# Patient Record
Sex: FEMALE | Race: WHITE | NOT HISPANIC OR LATINO | Employment: UNEMPLOYED | URBAN - METROPOLITAN AREA
[De-identification: names, ages, dates, MRNs, and addresses within clinical notes are randomized per-mention and may not be internally consistent; named-entity substitution may affect disease eponyms.]

---

## 2017-03-16 ENCOUNTER — HOSPITAL ENCOUNTER (EMERGENCY)
Facility: MEDICAL CENTER | Age: 32
End: 2017-03-16
Attending: EMERGENCY MEDICINE
Payer: MEDICAID

## 2017-03-16 ENCOUNTER — APPOINTMENT (OUTPATIENT)
Dept: RADIOLOGY | Facility: MEDICAL CENTER | Age: 32
End: 2017-03-16
Attending: EMERGENCY MEDICINE
Payer: MEDICAID

## 2017-03-16 VITALS
BODY MASS INDEX: 32.42 KG/M2 | WEIGHT: 218.92 LBS | HEIGHT: 69 IN | RESPIRATION RATE: 18 BRPM | DIASTOLIC BLOOD PRESSURE: 69 MMHG | OXYGEN SATURATION: 99 % | TEMPERATURE: 98.4 F | HEART RATE: 84 BPM | SYSTOLIC BLOOD PRESSURE: 118 MMHG

## 2017-03-16 DIAGNOSIS — L03.116 CELLULITIS OF LEFT LOWER LEG: ICD-10-CM

## 2017-03-16 LAB
ALBUMIN SERPL BCP-MCNC: 3.7 G/DL (ref 3.2–4.9)
ALBUMIN/GLOB SERPL: 1.2 G/DL
ALP SERPL-CCNC: 65 U/L (ref 30–99)
ALT SERPL-CCNC: 47 U/L (ref 2–50)
ANION GAP SERPL CALC-SCNC: 9 MMOL/L (ref 0–11.9)
AST SERPL-CCNC: 20 U/L (ref 12–45)
BASOPHILS # BLD AUTO: 0.3 % (ref 0–1.8)
BASOPHILS # BLD: 0.03 K/UL (ref 0–0.12)
BILIRUB SERPL-MCNC: 1 MG/DL (ref 0.1–1.5)
BUN SERPL-MCNC: 13 MG/DL (ref 8–22)
CALCIUM SERPL-MCNC: 9.2 MG/DL (ref 8.5–10.5)
CHLORIDE SERPL-SCNC: 103 MMOL/L (ref 96–112)
CO2 SERPL-SCNC: 23 MMOL/L (ref 20–33)
CREAT SERPL-MCNC: 0.63 MG/DL (ref 0.5–1.4)
EOSINOPHIL # BLD AUTO: 0.11 K/UL (ref 0–0.51)
EOSINOPHIL NFR BLD: 1.1 % (ref 0–6.9)
ERYTHROCYTE [DISTWIDTH] IN BLOOD BY AUTOMATED COUNT: 42.5 FL (ref 35.9–50)
GFR SERPL CREATININE-BSD FRML MDRD: >60 ML/MIN/1.73 M 2
GLOBULIN SER CALC-MCNC: 3.1 G/DL (ref 1.9–3.5)
GLUCOSE SERPL-MCNC: 112 MG/DL (ref 65–99)
HCT VFR BLD AUTO: 39.2 % (ref 37–47)
HGB BLD-MCNC: 13.4 G/DL (ref 12–16)
IMM GRANULOCYTES # BLD AUTO: 0.04 K/UL (ref 0–0.11)
IMM GRANULOCYTES NFR BLD AUTO: 0.4 % (ref 0–0.9)
LYMPHOCYTES # BLD AUTO: 2.36 K/UL (ref 1–4.8)
LYMPHOCYTES NFR BLD: 23.6 % (ref 22–41)
MCH RBC QN AUTO: 31.5 PG (ref 27–33)
MCHC RBC AUTO-ENTMCNC: 34.2 G/DL (ref 33.6–35)
MCV RBC AUTO: 92.2 FL (ref 81.4–97.8)
MONOCYTES # BLD AUTO: 0.69 K/UL (ref 0–0.85)
MONOCYTES NFR BLD AUTO: 6.9 % (ref 0–13.4)
NEUTROPHILS # BLD AUTO: 6.76 K/UL (ref 2–7.15)
NEUTROPHILS NFR BLD: 67.7 % (ref 44–72)
NRBC # BLD AUTO: 0 K/UL
NRBC BLD AUTO-RTO: 0 /100 WBC
PLATELET # BLD AUTO: 240 K/UL (ref 164–446)
PMV BLD AUTO: 8.8 FL (ref 9–12.9)
POTASSIUM SERPL-SCNC: 3.4 MMOL/L (ref 3.6–5.5)
PROT SERPL-MCNC: 6.8 G/DL (ref 6–8.2)
RBC # BLD AUTO: 4.25 M/UL (ref 4.2–5.4)
SODIUM SERPL-SCNC: 135 MMOL/L (ref 135–145)
WBC # BLD AUTO: 10 K/UL (ref 4.8–10.8)

## 2017-03-16 PROCEDURE — 99285 EMERGENCY DEPT VISIT HI MDM: CPT

## 2017-03-16 PROCEDURE — 85025 COMPLETE CBC W/AUTO DIFF WBC: CPT

## 2017-03-16 PROCEDURE — 76882 US LMTD JT/FCL EVL NVASC XTR: CPT | Mod: LT

## 2017-03-16 PROCEDURE — 80053 COMPREHEN METABOLIC PANEL: CPT

## 2017-03-16 PROCEDURE — 36415 COLL VENOUS BLD VENIPUNCTURE: CPT

## 2017-03-16 PROCEDURE — 96365 THER/PROPH/DIAG IV INF INIT: CPT

## 2017-03-16 PROCEDURE — 87040 BLOOD CULTURE FOR BACTERIA: CPT | Mod: 91

## 2017-03-16 PROCEDURE — 96375 TX/PRO/DX INJ NEW DRUG ADDON: CPT

## 2017-03-16 PROCEDURE — 700111 HCHG RX REV CODE 636 W/ 250 OVERRIDE (IP): Performed by: EMERGENCY MEDICINE

## 2017-03-16 RX ORDER — SODIUM CHLORIDE 9 MG/ML
INJECTION, SOLUTION INTRAVENOUS CONTINUOUS
Status: DISCONTINUED | OUTPATIENT
Start: 2017-03-16 | End: 2017-03-16 | Stop reason: HOSPADM

## 2017-03-16 RX ORDER — ONDANSETRON 2 MG/ML
4 INJECTION INTRAMUSCULAR; INTRAVENOUS ONCE
Status: COMPLETED | OUTPATIENT
Start: 2017-03-16 | End: 2017-03-16

## 2017-03-16 RX ORDER — CEFTRIAXONE 1 G/1
1 INJECTION, POWDER, FOR SOLUTION INTRAMUSCULAR; INTRAVENOUS ONCE
Status: COMPLETED | OUTPATIENT
Start: 2017-03-16 | End: 2017-03-16

## 2017-03-16 RX ORDER — MORPHINE SULFATE 4 MG/ML
4 INJECTION, SOLUTION INTRAMUSCULAR; INTRAVENOUS ONCE
Status: COMPLETED | OUTPATIENT
Start: 2017-03-16 | End: 2017-03-16

## 2017-03-16 RX ORDER — SULFAMETHOXAZOLE AND TRIMETHOPRIM 800; 160 MG/1; MG/1
1 TABLET ORAL 2 TIMES DAILY
Qty: 20 TAB | Refills: 0 | Status: SHIPPED | OUTPATIENT
Start: 2017-03-16 | End: 2017-03-16

## 2017-03-16 RX ORDER — SULFAMETHOXAZOLE AND TRIMETHOPRIM 800; 160 MG/1; MG/1
1 TABLET ORAL 2 TIMES DAILY
Qty: 20 TAB | Refills: 0 | Status: SHIPPED | OUTPATIENT
Start: 2017-03-16 | End: 2017-03-26

## 2017-03-16 RX ORDER — CEFDINIR 300 MG/1
300 CAPSULE ORAL 2 TIMES DAILY
Qty: 20 CAP | Refills: 0 | Status: SHIPPED | OUTPATIENT
Start: 2017-03-16 | End: 2017-03-26

## 2017-03-16 RX ORDER — CEFDINIR 300 MG/1
300 CAPSULE ORAL 2 TIMES DAILY
Qty: 20 CAP | Refills: 0 | Status: SHIPPED | OUTPATIENT
Start: 2017-03-16 | End: 2017-03-16

## 2017-03-16 RX ADMIN — MORPHINE SULFATE 4 MG: 4 INJECTION INTRAVENOUS at 17:32

## 2017-03-16 RX ADMIN — CEFTRIAXONE 1 G: 1 INJECTION, POWDER, FOR SOLUTION INTRAMUSCULAR; INTRAVENOUS at 17:32

## 2017-03-16 RX ADMIN — ONDANSETRON 4 MG: 2 INJECTION, SOLUTION INTRAMUSCULAR; INTRAVENOUS at 17:32

## 2017-03-16 NOTE — ED NOTES
Chief Complaint   Patient presents with   • Wound Check     Patient states she was seen at [facility] for a wound infection to LLE. She was prescribed abx but hasn't been able to fill them due to issues with her maiden name. Patient here for re-check and prescriptions.

## 2017-03-16 NOTE — ED AVS SNAPSHOT
Home Care Instructions                                                                                                                Kat Devlin   MRN: [id number]    Department:  [facility], Emergency Dept   Date of Visit:  3/16/2017            [facility], Emergency Dept    [address] [facility] [address]    Phone:  [phone]      You were seen by     Catherine Palacios M.D.      Your Diagnosis Was     Cellulitis of left lower leg     L03.116       These are the medications you received during your hospitalization from 03/16/2017 1438 to 03/16/2017 1815     Date/Time Order Dose Route Action    03/16/2017 1732 morphine (pf) 4 mg/ml injection 4 mg 4 mg Intravenous Given    03/16/2017 1732 ondansetron (ZOFRAN) syringe/vial injection 4 mg 4 mg Intravenous Given    03/16/2017 1732 cefTRIAXone (ROCEPHIN) injection 1 g 1 g Intravenous Given      Follow-up Information     1. Follow up with LAURA PUENTE. Call in 1 day.    Why:  for recheck, to establish care    Contact information    [address] [place] [address] [phone]      Medication Information     Review all of your home medications and newly ordered medications with your primary doctor and/or pharmacist as soon as possible. Follow medication instructions as directed by your doctor and/or pharmacist.     Please keep your complete medication list with you and share with your physician. Update the information when medications are discontinued, doses are changed, or new medications (including over-the-counter products) are added; and carry medication information at all times in the event of emergency situations.               Medication List      START taking these medications        Instructions    Morning Afternoon Evening Bedtime    cefdinir 300 MG Caps   Commonly known as:  OMNICEF        Take 1 Cap by mouth 2 times a day for 10 days.   Dose:  300 mg                        sulfamethoxazole-trimethoprim 800-160 MG tablet   Commonly known as:  BACTRIM DS        Take 1 Tab by mouth 2 times a day for 10 days.   Dose:  1 Tab                          ASK your doctor about these medications        Instructions    Morning Afternoon Evening Bedtime    ondansetron 4 MG Tbdp   Commonly known as:  ZOFRAN ODT        Take 1 Tab by mouth every four hours as needed for Nausea/Vomiting (give PO if no IV route available).   Dose:  4 mg                        oxycodone immediate-release 5 MG Tabs   Commonly known as:  ROXICODONE        Take 1 Tab by mouth every four hours as needed.   Dose:  5 mg                             Where to Get Your Medications      These medications were sent to [id number] - [place], [place] [facility] [address] [facility] [address] [place] [address]     Phone:  [phone]    - cefdinir 300 MG Caps  - sulfamethoxazole-trimethoprim 800-160 MG tablet            Procedures and tests performed during your visit     Procedure/Test Number of Times Performed    BLOOD CULTURE 2    CBC WITH DIFFERENTIAL 1    COMP METABOLIC PANEL 1    ESTIMATED GFR 1    IV Saline Lock 1    US-EXTREMITY NON VASCULAR UNILATERAL LEFT 1        Discharge Instructions       Cellulitis  Cellulitis is an infection of the skin and the tissue under the skin. The infected area is usually red and tender. This happens most often in the arms and lower legs.  HOME CARE   · Take your antibiotic medicine as told. Finish the medicine even if you start to feel better.  · Keep the infected arm or leg raised (elevated).  · Put a warm cloth on the area up to 4 times per day.  · Only take medicines as told by your doctor.  · Keep all doctor visits as told.  GET HELP IF:  · You see red streaks on the skin coming from the infected area.  · Your red area gets bigger or turns a dark color.  · Your bone or joint under the infected area is painful after the skin heals.  · Your infection comes back in  the same area or different area.  · You have a puffy (swollen) bump in the infected area.  · You have new symptoms.  · You have a fever.  GET HELP RIGHT AWAY IF:   · You feel very sleepy.  · You throw up (vomit) or have watery poop (diarrhea).  · You feel sick and have muscle aches and pains.  MAKE SURE YOU:   · Understand these instructions.  · Will watch your condition.  · Will get help right away if you are not doing well or get worse.     This information is not intended to replace advice given to you by your health care provider. Make sure you discuss any questions you have with your health care provider.     Document Released: 06/05/2009 Document Revised: 01/08/2016 Document Reviewed: 03/04/2013  [employer] Interactive Patient Education ©2016 [employer]            Patient Information     Patient Information    Following emergency treatment: all patient requiring follow-up care must return either to a private physician or a clinic if your condition worsens before you are able to obtain further medical attention, please return to the emergency room.     Billing Information    At [facility], we work to make the billing process streamlined for our patients.  Our Representatives are here to answer any questions you may have regarding your hospital bill.  If you have insurance coverage and have supplied your insurance information to us, we will submit a claim to your insurer on your behalf.  Should you have any questions regarding your bill, we can be reached online or by phone as follows:  Online: You are able pay your bills online or live chat with our representatives about any billing questions you may have. We are here to help Monday - Friday from 8:00am to 7:30pm and 9:00am - 12:00pm on Saturdays.  Please visit [email or web address]  for more information.   Phone:  [phone] or [phone]    Please note that your emergency physician, surgeon, pathologist, radiologist,  anesthesiologist, and other specialists are not employed by [facility] and will therefore bill separately for their services.  Please contact them directly for any questions concerning their bills at the numbers below:     Emergency Physician Services:  [phone]  [facility]:  [phone]  [facility]:  [phone]  [facility]:  [phone]    1. Your final bill may vary from the amount quoted upon discharge if all procedures are not complete at that time, or if your doctor has additional procedures of which we are not aware. You will receive an additional bill if you return to the Emergency Department at [facility] for suture removal regardless of the facility of which the sutures were placed.     2. Please arrange for settlement of this account at the emergency registration.    3. All self-pay accounts are due in full at the time of treatment.  If you are unable to meet this obligation then payment is expected within 4-5 days.     4. If you have had radiology studies (CT, X-ray, Ultrasound, MRI), you have received a preliminary result during your emergency department visit. Please contact the radiology department [phone] to receive a copy of your final result. Please discuss the Final result with your primary physician or with the follow up physician provided.     Crisis Hotline:  [place] Crisis Hotline:  1-412-EOPFPWI or 1-102.491.8564  [place] Crisis Hotline:    1-609.902.1776 or 196-268-5990         ED Discharge Follow Up Questions    1. In order to provide you with very good care, we would like to follow up with a phone call in the next few days.  May we have your permission to contact you?     YES /  NO    2. What is the best phone number to call you? (       )_____-__________    3. What is the best time to call you?      Morning  /  Afternoon  /  Evening                   Patient Signature:   ____________________________________________________________    Date:  ____________________________________________________________

## 2017-03-16 NOTE — ED AVS SNAPSHOT
[employer] Access Code: [email or web address]  Expires: 4/15/2017  6:15 PM    Your email address is not on file at [facility].  Email Addresses are required for you to sign up for [employer], please contact [phone] to verify your personal information and to provide your email address prior to attempting to register for [employer].    Kat Devlin  [address] [place] [address]    [employer]  A secure, online tool to manage your health information     [facility]’s [employer]® is a secure, online tool that connects you to your personalized health information from the privacy of your home -- day or night - making it very easy for you to manage your healthcare. Once the activation process is completed, you can even access your medical information using the [employer] gisella, which is available for free in the Apple Gisella store or Google Play store.     To learn more about [employer], visit [email or web address]    There are two levels of access available (as shown below):   My Chart Features  [facility] Primary Care Doctor [facility]  Specialists [facility]  Urgent  Care Non-[facility] Primary Care Doctor   Email your healthcare team securely and privately 24/7 X X X    Manage appointments: schedule your next appointment; view details of past/upcoming appointments X      Request prescription refills. X      View recent personal medical records, including lab and immunizations X X X X   View health record, including health history, allergies, medications X X X X   Read reports about your outpatient visits, procedures, consult and ER notes X X X X   See your discharge summary, which is a recap of your hospital and/or ER visit that includes your diagnosis, lab results, and care plan X X  X     How to register for [employer]:  Once your e-mail address has been verified, follow the following steps to sign up for [employer].     1. Go to  [email or web address]  2. Click on the Sign Up Now box, which takes you to the New Member Sign Up page. You will need  to provide the following information:  a. Enter your [employer] Access Code exactly as it appears at the top of this page. (You will not need to use this code after you’ve completed the sign-up process. If you do not sign up before the expiration date, you must request a new code.)   b. Enter your date of birth.   c. Enter your home email address.   d. Click Submit, and follow the next screen’s instructions.  3. Create a [employer] ID. This will be your [employer] login ID and cannot be changed, so think of one that is secure and easy to remember.  4. Create a [employer] password. You can change your password at any time.  5. Enter your Password Reset Question and Answer. This can be used at a later time if you forget your password.   6. Enter your e-mail address. This allows you to receive e-mail notifications when new information is available in [employer].  7. Click Sign Up. You can now view your health information.    For assistance activating your [employer] account, call [phone]

## 2017-03-16 NOTE — ED AVS SNAPSHOT
3/16/2017          Kat Devlin  [address] [place] [address]    Dear Kat:    [facility] wants to ensure your discharge home is safe and you or your loved ones have had all your questions answered regarding your care after you leave the hospital.    You may receive a telephone call within two days of your discharge.  This call is to make certain you understand your discharge instructions as well as ensure we provided you with the best care possible during your stay with us.     The call will only last approximately 3-5 minutes and will be done by a nurse.    Once again, we want to ensure your discharge home is safe and that you have a clear understanding of any next steps in your care.  If you have any questions or concerns, please do not hesitate to contact us, we are here for you.  Thank you for choosing [facility] for your healthcare needs.    Sincerely,    Osvaldo Jones    [facility]

## 2017-03-16 NOTE — ED PROVIDER NOTES
ED Provider Note    CHIEF COMPLAINT   Chief Complaint   Patient presents with   • Wound Check       HPI   Kat Devlin is a 31 y.o. female who presents claiming of her right lower extremity cellulitis. The patient states that she's had pain in the leg for about a week, but 2 days ago it turned red, hot and erythematous over the distal aspect of her left shin. She was seen at [facility] 2 days ago and given IV antibiotics and for a prescription for oral antibiotics. The patient states that she does not have an ID and they cannot prescribe her antibiotics under her  name, so she was unable to fill them as an outpatient. She will return to [facility] again today requesting a refill of the antibiotics, but was refused this and sent back home. She states that the pain in her left shin was much worse than it was 2 days ago. She states that the redness has not really extended beyond the border of the lines drawn initially. She has a lot of pain with walking. She denies any numbness distally. She denies any history of diabetes. Currently her pain is 10 over 10. Movement makes it worse and nothing helps it. She is homeless.     REVIEW OF SYSTEMS     HEENT:  No ear pain, congestion or sore throat   EYES: no discharge redness or vision changes  CARDIAC: no chest pain, palpitations    PULMONARY: no dyspnea, cough or congestion   GI: no vomiting diarrhea or abdominal pain   : no dysuria, back pain or hematuria   Neuro: no weakness, numbness aphasia or headache  Musculoskeletal: See history of present illness  Endocrine: Positive fevers, sweating, weight loss   SKIN: see history of present illness    See history of present illness all other systems are negative        PAST MEDICAL HISTORY   Past Medical History   Diagnosis Date   • Migraines    • Obesity    • Back pain 6/21/2011   • Headache(784.0) 7/25/2011   • Pain 1/2012     bilat. feet 6/10   • Arthritis      knee and back   • Type II or unspecified type diabetes  mellitus without mention of complication, not stated as uncontrolled      diet and oral meds   • Psychiatric disorder      depression and anxiety   • ASTHMA      rarely. uses inhaler exercise inducted   • PCOS (polycystic ovarian syndrome)        FAMILY HISTORY  Family History   Problem Relation Age of Onset   • Non-contributory Mother    • Hyperlipidemia Mother    • Non-contributory Father    • Alcohol/Drug Father    • Arthritis Maternal Grandmother    • Hyperlipidemia Maternal Grandmother    • Heart Disease Maternal Grandfather        SOCIAL HISTORY  Social History     Social History   • Marital Status: Single     Spouse Name: N/A   • Number of Children: N/A   • Years of Education: N/A     Social History Main Topics   • Smoking status: Current Every Day Smoker -- 0.50 packs/day for 18 years     Types: Cigarettes   • Smokeless tobacco: Never Used   • Alcohol Use: No   • Drug Use: Yes     Special: Intravenous      Comment: Meth   • Sexual Activity:     Partners: Male     Other Topics Concern   • Not on file     Social History Narrative    ** Merged History Encounter **             SURGICAL HISTORY  Past Surgical History   Procedure Laterality Date   • Pr  delivery only     • Repeat c section  2012     Performed by DESIREE TUCKER at [facility]   • Other       bilateral knee surgery   • Other  2014     oral surgery in office   • Tubal coagulation laparoscopic bilateral  2015     Performed by Juan R Sutton M.D. at [facility]   • Dilation and curettage  2015     Performed by Juan R Sutton M.D. at [facility]   • Hysteroscopy novasure-2  2015     Performed by Juan R Sutton M.D. at [facility]       CURRENT MEDICATIONS   Home Medications     **Home medications have not yet been reviewed for this encounter**          ALLERGIES   No Known Allergies    PHYSICAL EXAM  VITAL SIGNS: /73 mmHg  Pulse 99  Temp(Src) 36.9  "°C (98.4 °F)  Resp 18  Ht 1.753 m (5' 9\")  Wt 99.3 kg (218 lb 14.7 oz)  BMI 32.31 kg/m2  SpO2 99% Room air O2: 99    Constitutional: Well developed, Well nourished, No acute distress, Non-toxic appearance.   Cardiovascular: Normal heart rate, Normal rhythm, No murmurs, No rubs, No gallops.   Thorax & Lungs: Normal breath sounds, No respiratory distress, No wheezing, No chest tenderness.   Skin: The patient's right lower anterior shin is erythematous and acutely tender with edema, there is no drainage. Distally she is neurovascularly intact, but has a lot of pain in the leg with any sort of movement of her foot  Extremities: Intact distal pulses, No edema, No tenderness, No cyanosis, No clubbing.       COURSE & MEDICAL DECISION MAKING  Pertinent Labs & Imaging studies reviewed. (See chart for details)  This time, I'm concerned about a deep abscess. An IV was started and I ordered IV antibiotics as well as pain medications.      Radiology results:  US-EXTREMITY NON VASCULAR UNILATERAL LEFT   Final Result      1.  No sonographic evidence of abscess.      2.  There is edema in the soft tissues. Cellulitis is in the differential diagnosis.        Results for orders placed or performed during the hospital encounter of 03/16/17   CBC WITH DIFFERENTIAL   Result Value Ref Range    WBC 10.0 4.8 - 10.8 K/uL    RBC 4.25 4.20 - 5.40 M/uL    Hemoglobin 13.4 12.0 - 16.0 g/dL    Hematocrit 39.2 37.0 - 47.0 %    MCV 92.2 81.4 - 97.8 fL    MCH 31.5 27.0 - 33.0 pg    MCHC 34.2 33.6 - 35.0 g/dL    RDW 42.5 35.9 - 50.0 fL    Platelet Count 240 164 - 446 K/uL    MPV 8.8 (L) 9.0 - 12.9 fL    Neutrophils-Polys 67.70 44.00 - 72.00 %    Lymphocytes 23.60 22.00 - 41.00 %    Monocytes 6.90 0.00 - 13.40 %    Eosinophils 1.10 0.00 - 6.90 %    Basophils 0.30 0.00 - 1.80 %    Immature Granulocytes 0.40 0.00 - 0.90 %    Nucleated RBC 0.00 /100 WBC    Neutrophils (Absolute) 6.76 2.00 - 7.15 K/uL    Lymphs (Absolute) 2.36 1.00 - 4.80 K/uL    " Monos (Absolute) 0.69 0.00 - 0.85 K/uL    Eos (Absolute) 0.11 0.00 - 0.51 K/uL    Baso (Absolute) 0.03 0.00 - 0.12 K/uL    Immature Granulocytes (abs) 0.04 0.00 - 0.11 K/uL    NRBC (Absolute) 0.00 K/uL   COMP METABOLIC PANEL   Result Value Ref Range    Sodium 135 135 - 145 mmol/L    Potassium 3.4 (L) 3.6 - 5.5 mmol/L    Chloride 103 96 - 112 mmol/L    Co2 23 20 - 33 mmol/L    Anion Gap 9.0 0.0 - 11.9    Glucose 112 (H) 65 - 99 mg/dL    Bun 13 8 - 22 mg/dL    Creatinine 0.63 0.50 - 1.40 mg/dL    Calcium 9.2 8.5 - 10.5 mg/dL    AST(SGOT) 20 12 - 45 U/L    ALT(SGPT) 47 2 - 50 U/L    Alkaline Phosphatase 65 30 - 99 U/L    Total Bilirubin 1.0 0.1 - 1.5 mg/dL    Albumin 3.7 3.2 - 4.9 g/dL    Total Protein 6.8 6.0 - 8.2 g/dL    Globulin 3.1 1.9 - 3.5 g/dL    A-G Ratio 1.2 g/dL   ESTIMATED GFR   Result Value Ref Range    GFR If African American >60 >60 mL/min/1.73 m 2    GFR If Non African American >60 >60 mL/min/1.73 m 2        The patient's ultrasound does not show an abscess. Her white count is normal.  I will dress her wound with an emetic ointment and give her crutches to stay off her leg. We'll give her a copy of her facies show she can get her antibiotics filled for free at any pharmacy. At this time, she will be discharged in stable condition.    LAURA PUENTE  [address] [id number] [phone]  Call in 1 day  for recheck, to establish care    Current Outpatient Prescriptions   Medication Sig Dispense Refill   • cefdinir (OMNICEF) 300 MG Cap Take 1 Cap by mouth 2 times a day for 10 days. 20 Cap 0   • sulfamethoxazole-trimethoprim (BACTRIM DS) 800-160 MG tablet Take 1 Tab by mouth 2 times a day for 10 days. 20 Tab 0   • oxycodone immediate-release (ROXICODONE) 5 MG Tab Take 1 Tab by mouth every four hours as needed. 20 Tab 0   • ondansetron (ZOFRAN ODT) 4 MG TABLET DISPERSIBLE Take 1 Tab by mouth every four hours as needed for Nausea/Vomiting (give PO if no IV route available). 10 Tab 0          FINAL IMPRESSION  1. Cellulitis of left lower leg            Electronically signed by: Catherine Palacios, 3/16/2017 4:24 PM

## 2017-03-17 NOTE — ED NOTES
Pt given crutches and instructions for use. Pt return demonstrates proper use. Discharge instructions given. Verbalizes understanding. Left with all belongings. Walked to CARL[facility] lujan.

## 2017-03-17 NOTE — DISCHARGE PLANNING
TC from pts [facility] stating she cannot get her antibiotics due to her ID having the wrong  name.    Pt doesn't need identification for antibiotics, SW supplying facesheet for pt to provide to any pharmacy. Pt cannot get narcotics without an ID that matches the prescribed name. Pt has Medicaid - [employer] all prescriptions are free, no copay.     Pt also has [employer] for free transportation to pharmacy and home from hospital. Pt can d/c to lobby when medically appropriate to contact [facility]. [facility] has a 24 hour pharmacy.

## 2017-03-17 NOTE — DISCHARGE INSTRUCTIONS
Cellulitis  Cellulitis is an infection of the skin and the tissue under the skin. The infected area is usually red and tender. This happens most often in the arms and lower legs.  HOME CARE   · Take your antibiotic medicine as told. Finish the medicine even if you start to feel better.  · Keep the infected arm or leg raised (elevated).  · Put a warm cloth on the area up to 4 times per day.  · Only take medicines as told by your doctor.  · Keep all doctor visits as told.  GET HELP IF:  · You see red streaks on the skin coming from the infected area.  · Your red area gets bigger or turns a dark color.  · Your bone or joint under the infected area is painful after the skin heals.  · Your infection comes back in the same area or different area.  · You have a puffy (swollen) bump in the infected area.  · You have new symptoms.  · You have a fever.  GET HELP RIGHT AWAY IF:   · You feel very sleepy.  · You throw up (vomit) or have watery poop (diarrhea).  · You feel sick and have muscle aches and pains.  MAKE SURE YOU:   · Understand these instructions.  · Will watch your condition.  · Will get help right away if you are not doing well or get worse.     This information is not intended to replace advice given to you by your health care provider. Make sure you discuss any questions you have with your health care provider.     Document Released: 06/05/2009 Document Revised: 01/08/2016 Document Reviewed: 03/04/2013  [employer] Interactive Patient Education ©2016 [employer]

## 2017-03-21 LAB
BACTERIA BLD CULT: NORMAL
BACTERIA BLD CULT: NORMAL
SIGNIFICANT IND 70042: NORMAL
SIGNIFICANT IND 70042: NORMAL
SITE SITE: NORMAL
SITE SITE: NORMAL
SOURCE SOURCE: NORMAL
SOURCE SOURCE: NORMAL

## 2017-04-20 ENCOUNTER — HOSPITAL ENCOUNTER (EMERGENCY)
Facility: MEDICAL CENTER | Age: 32
End: 2017-04-20
Payer: MEDICAID

## 2017-04-20 VITALS
SYSTOLIC BLOOD PRESSURE: 107 MMHG | HEART RATE: 106 BPM | OXYGEN SATURATION: 96 % | RESPIRATION RATE: 18 BRPM | TEMPERATURE: 99 F | BODY MASS INDEX: 33.8 KG/M2 | WEIGHT: 229 LBS | DIASTOLIC BLOOD PRESSURE: 79 MMHG

## 2017-04-20 PROCEDURE — 302449 STATCHG TRIAGE ONLY (STATISTIC)

## 2017-04-21 NOTE — ED NOTES
"Pt ambulates to triage with c/c open wounds to left lower leg & bilateral forearms.  \"It's from me picking at myself.\"  A&ox4.  Pt to lobby & advised to inform RN of any changes.  "

## 2017-04-22 ENCOUNTER — RESOLUTE PROFESSIONAL BILLING HOSPITAL PROF FEE (OUTPATIENT)
Dept: HOSPITALIST | Facility: MEDICAL CENTER | Age: 32
End: 2017-04-22
Payer: MEDICAID

## 2017-04-22 ENCOUNTER — HOSPITAL ENCOUNTER (INPATIENT)
Facility: MEDICAL CENTER | Age: 32
LOS: 3 days | DRG: 603 | End: 2017-04-25
Attending: EMERGENCY MEDICINE | Admitting: HOSPITALIST
Payer: MEDICAID

## 2017-04-22 DIAGNOSIS — A41.9 SEPSIS, DUE TO UNSPECIFIED ORGANISM: ICD-10-CM

## 2017-04-22 DIAGNOSIS — L03.116 CELLULITIS OF LEFT LOWER EXTREMITY: ICD-10-CM

## 2017-04-22 PROBLEM — L03.90 CELLULITIS: Status: ACTIVE | Noted: 2017-04-22

## 2017-04-22 LAB
ANION GAP SERPL CALC-SCNC: 10 MMOL/L (ref 0–11.9)
APPEARANCE UR: CLEAR
BASOPHILS # BLD AUTO: 0 % (ref 0–1.8)
BASOPHILS # BLD: 0 K/UL (ref 0–0.12)
BILIRUB UR QL STRIP.AUTO: NEGATIVE
BUN SERPL-MCNC: 16 MG/DL (ref 8–22)
CALCIUM SERPL-MCNC: 9.1 MG/DL (ref 8.5–10.5)
CHLORIDE SERPL-SCNC: 99 MMOL/L (ref 96–112)
CO2 SERPL-SCNC: 24 MMOL/L (ref 20–33)
COLOR UR: YELLOW
CREAT SERPL-MCNC: 1.03 MG/DL (ref 0.5–1.4)
EOSINOPHIL # BLD AUTO: 0.14 K/UL (ref 0–0.51)
EOSINOPHIL NFR BLD: 0.9 % (ref 0–6.9)
ERYTHROCYTE [DISTWIDTH] IN BLOOD BY AUTOMATED COUNT: 44.8 FL (ref 35.9–50)
GFR SERPL CREATININE-BSD FRML MDRD: >60 ML/MIN/1.73 M 2
GLUCOSE SERPL-MCNC: 123 MG/DL (ref 65–99)
GLUCOSE UR STRIP.AUTO-MCNC: NEGATIVE MG/DL
HCG SERPL QL: NEGATIVE
HCT VFR BLD AUTO: 42.2 % (ref 37–47)
HGB BLD-MCNC: 14.1 G/DL (ref 12–16)
KETONES UR STRIP.AUTO-MCNC: NEGATIVE MG/DL
LACTATE BLD-SCNC: 1.4 MMOL/L (ref 0.5–2)
LEUKOCYTE ESTERASE UR QL STRIP.AUTO: ABNORMAL
LYMPHOCYTES # BLD AUTO: 4.98 K/UL (ref 1–4.8)
LYMPHOCYTES NFR BLD: 33 % (ref 22–41)
MAGNESIUM SERPL-MCNC: 1.9 MG/DL (ref 1.5–2.5)
MANUAL DIFF BLD: NORMAL
MCH RBC QN AUTO: 31.6 PG (ref 27–33)
MCHC RBC AUTO-ENTMCNC: 33.4 G/DL (ref 33.6–35)
MCV RBC AUTO: 94.6 FL (ref 81.4–97.8)
MICRO URNS: ABNORMAL
MONOCYTES # BLD AUTO: 0.68 K/UL (ref 0–0.85)
MONOCYTES NFR BLD AUTO: 4.5 % (ref 0–13.4)
MORPHOLOGY BLD-IMP: NORMAL
MUCOUS THREADS #/AREA URNS HPF: ABNORMAL /HPF
NEUTROPHILS # BLD AUTO: 9.3 K/UL (ref 2–7.15)
NEUTROPHILS NFR BLD: 58.9 % (ref 44–72)
NEUTS BAND NFR BLD MANUAL: 2.7 % (ref 0–10)
NITRITE UR QL STRIP.AUTO: NEGATIVE
NRBC # BLD AUTO: 0 K/UL
NRBC BLD AUTO-RTO: 0 /100 WBC
PH UR STRIP.AUTO: 5.5 [PH]
PLATELET # BLD AUTO: 219 K/UL (ref 164–446)
PLATELET BLD QL SMEAR: NORMAL
PMV BLD AUTO: 8.9 FL (ref 9–12.9)
POTASSIUM SERPL-SCNC: 3.4 MMOL/L (ref 3.6–5.5)
PROT UR QL STRIP: NEGATIVE MG/DL
RBC # BLD AUTO: 4.46 M/UL (ref 4.2–5.4)
RBC # URNS HPF: ABNORMAL /HPF
RBC BLD AUTO: PRESENT
RBC UR QL AUTO: NEGATIVE
SMUDGE CELLS BLD QL SMEAR: NORMAL
SODIUM SERPL-SCNC: 133 MMOL/L (ref 135–145)
SP GR UR STRIP.AUTO: 1.02
WBC # BLD AUTO: 15.1 K/UL (ref 4.8–10.8)
WBC #/AREA URNS HPF: ABNORMAL /HPF

## 2017-04-22 PROCEDURE — 84703 CHORIONIC GONADOTROPIN ASSAY: CPT

## 2017-04-22 PROCEDURE — 96365 THER/PROPH/DIAG IV INF INIT: CPT

## 2017-04-22 PROCEDURE — 700102 HCHG RX REV CODE 250 W/ 637 OVERRIDE(OP): Performed by: HOSPITALIST

## 2017-04-22 PROCEDURE — 85007 BL SMEAR W/DIFF WBC COUNT: CPT

## 2017-04-22 PROCEDURE — A9270 NON-COVERED ITEM OR SERVICE: HCPCS | Performed by: HOSPITALIST

## 2017-04-22 PROCEDURE — 96375 TX/PRO/DX INJ NEW DRUG ADDON: CPT

## 2017-04-22 PROCEDURE — 700111 HCHG RX REV CODE 636 W/ 250 OVERRIDE (IP)

## 2017-04-22 PROCEDURE — 96368 THER/DIAG CONCURRENT INF: CPT

## 2017-04-22 PROCEDURE — 700105 HCHG RX REV CODE 258

## 2017-04-22 PROCEDURE — 99223 1ST HOSP IP/OBS HIGH 75: CPT | Performed by: HOSPITALIST

## 2017-04-22 PROCEDURE — 770006 HCHG ROOM/CARE - MED/SURG/GYN SEMI*

## 2017-04-22 PROCEDURE — 96366 THER/PROPH/DIAG IV INF ADDON: CPT

## 2017-04-22 PROCEDURE — 700111 HCHG RX REV CODE 636 W/ 250 OVERRIDE (IP): Performed by: EMERGENCY MEDICINE

## 2017-04-22 PROCEDURE — 302132 K THERMIA MOTOR: Performed by: HOSPITALIST

## 2017-04-22 PROCEDURE — 700105 HCHG RX REV CODE 258: Performed by: HOSPITALIST

## 2017-04-22 PROCEDURE — 83735 ASSAY OF MAGNESIUM: CPT

## 2017-04-22 PROCEDURE — 99285 EMERGENCY DEPT VISIT HI MDM: CPT

## 2017-04-22 PROCEDURE — 83605 ASSAY OF LACTIC ACID: CPT

## 2017-04-22 PROCEDURE — 85027 COMPLETE CBC AUTOMATED: CPT

## 2017-04-22 PROCEDURE — 36415 COLL VENOUS BLD VENIPUNCTURE: CPT

## 2017-04-22 PROCEDURE — 80048 BASIC METABOLIC PNL TOTAL CA: CPT

## 2017-04-22 PROCEDURE — 87086 URINE CULTURE/COLONY COUNT: CPT

## 2017-04-22 PROCEDURE — 81001 URINALYSIS AUTO W/SCOPE: CPT

## 2017-04-22 PROCEDURE — 87040 BLOOD CULTURE FOR BACTERIA: CPT | Mod: 91

## 2017-04-22 PROCEDURE — 94760 N-INVAS EAR/PLS OXIMETRY 1: CPT

## 2017-04-22 PROCEDURE — 700111 HCHG RX REV CODE 636 W/ 250 OVERRIDE (IP): Performed by: HOSPITALIST

## 2017-04-22 RX ORDER — SODIUM CHLORIDE 9 MG/ML
INJECTION, SOLUTION INTRAVENOUS CONTINUOUS
Status: DISCONTINUED | OUTPATIENT
Start: 2017-04-22 | End: 2017-04-25 | Stop reason: HOSPADM

## 2017-04-22 RX ORDER — ENALAPRILAT 1.25 MG/ML
1.25 INJECTION INTRAVENOUS EVERY 6 HOURS PRN
Status: DISCONTINUED | OUTPATIENT
Start: 2017-04-22 | End: 2017-04-25 | Stop reason: HOSPADM

## 2017-04-22 RX ORDER — ALBUTEROL SULFATE 90 UG/1
2 AEROSOL, METERED RESPIRATORY (INHALATION) EVERY 4 HOURS PRN
Status: DISCONTINUED | OUTPATIENT
Start: 2017-04-22 | End: 2017-04-25 | Stop reason: HOSPADM

## 2017-04-22 RX ORDER — ONDANSETRON 2 MG/ML
4 INJECTION INTRAMUSCULAR; INTRAVENOUS EVERY 4 HOURS PRN
Status: DISCONTINUED | OUTPATIENT
Start: 2017-04-22 | End: 2017-04-25 | Stop reason: HOSPADM

## 2017-04-22 RX ORDER — LORAZEPAM 1 MG/1
0.5 TABLET ORAL EVERY 6 HOURS PRN
Status: DISCONTINUED | OUTPATIENT
Start: 2017-04-22 | End: 2017-04-25

## 2017-04-22 RX ORDER — POLYETHYLENE GLYCOL 3350 17 G/17G
1 POWDER, FOR SOLUTION ORAL
Status: DISCONTINUED | OUTPATIENT
Start: 2017-04-22 | End: 2017-04-25 | Stop reason: HOSPADM

## 2017-04-22 RX ORDER — ONDANSETRON 4 MG/1
4 TABLET, ORALLY DISINTEGRATING ORAL EVERY 4 HOURS PRN
Status: DISCONTINUED | OUTPATIENT
Start: 2017-04-22 | End: 2017-04-25 | Stop reason: HOSPADM

## 2017-04-22 RX ORDER — LABETALOL HYDROCHLORIDE 5 MG/ML
10 INJECTION, SOLUTION INTRAVENOUS EVERY 4 HOURS PRN
Status: DISCONTINUED | OUTPATIENT
Start: 2017-04-22 | End: 2017-04-25 | Stop reason: HOSPADM

## 2017-04-22 RX ORDER — AMOXICILLIN 250 MG
2 CAPSULE ORAL 2 TIMES DAILY
Status: DISCONTINUED | OUTPATIENT
Start: 2017-04-22 | End: 2017-04-25 | Stop reason: HOSPADM

## 2017-04-22 RX ORDER — OXYCODONE HYDROCHLORIDE 5 MG/1
5 TABLET ORAL
Status: DISCONTINUED | OUTPATIENT
Start: 2017-04-22 | End: 2017-04-25

## 2017-04-22 RX ORDER — BACITRACIN ZINC 500 [USP'U]/G
OINTMENT TOPICAL 3 TIMES DAILY
Status: DISCONTINUED | OUTPATIENT
Start: 2017-04-22 | End: 2017-04-25 | Stop reason: HOSPADM

## 2017-04-22 RX ORDER — ACETAMINOPHEN 325 MG/1
650 TABLET ORAL EVERY 6 HOURS PRN
Status: DISCONTINUED | OUTPATIENT
Start: 2017-04-22 | End: 2017-04-25

## 2017-04-22 RX ORDER — LORAZEPAM 2 MG/ML
0.5 INJECTION INTRAMUSCULAR EVERY 6 HOURS PRN
Status: DISCONTINUED | OUTPATIENT
Start: 2017-04-22 | End: 2017-04-25

## 2017-04-22 RX ORDER — SODIUM CHLORIDE 9 MG/ML
30 INJECTION, SOLUTION INTRAVENOUS
Status: COMPLETED | OUTPATIENT
Start: 2017-04-22 | End: 2017-04-22

## 2017-04-22 RX ORDER — MORPHINE SULFATE 4 MG/ML
2 INJECTION, SOLUTION INTRAMUSCULAR; INTRAVENOUS
Status: DISCONTINUED | OUTPATIENT
Start: 2017-04-22 | End: 2017-04-25

## 2017-04-22 RX ORDER — DIPHENHYDRAMINE HYDROCHLORIDE 50 MG/ML
12.5-25 INJECTION INTRAMUSCULAR; INTRAVENOUS EVERY 6 HOURS PRN
Status: DISCONTINUED | OUTPATIENT
Start: 2017-04-22 | End: 2017-04-25 | Stop reason: HOSPADM

## 2017-04-22 RX ORDER — ACETAMINOPHEN 325 MG/1
650 TABLET ORAL ONCE
Status: ACTIVE | OUTPATIENT
Start: 2017-04-22 | End: 2017-04-23

## 2017-04-22 RX ORDER — POTASSIUM CHLORIDE 20 MEQ/1
20 TABLET, EXTENDED RELEASE ORAL ONCE
Status: COMPLETED | OUTPATIENT
Start: 2017-04-22 | End: 2017-04-22

## 2017-04-22 RX ORDER — ONDANSETRON 2 MG/ML
4 INJECTION INTRAMUSCULAR; INTRAVENOUS ONCE
Status: COMPLETED | OUTPATIENT
Start: 2017-04-22 | End: 2017-04-22

## 2017-04-22 RX ORDER — AMPICILLIN AND SULBACTAM 2; 1 G/1; G/1
3 INJECTION, POWDER, FOR SOLUTION INTRAMUSCULAR; INTRAVENOUS ONCE
Status: COMPLETED | OUTPATIENT
Start: 2017-04-22 | End: 2017-04-22

## 2017-04-22 RX ORDER — PROMETHAZINE HYDROCHLORIDE 25 MG/1
12.5-25 TABLET ORAL EVERY 4 HOURS PRN
Status: DISCONTINUED | OUTPATIENT
Start: 2017-04-22 | End: 2017-04-25 | Stop reason: HOSPADM

## 2017-04-22 RX ORDER — DIPHENHYDRAMINE HCL 25 MG
25 TABLET ORAL EVERY 6 HOURS PRN
Status: DISCONTINUED | OUTPATIENT
Start: 2017-04-22 | End: 2017-04-25 | Stop reason: HOSPADM

## 2017-04-22 RX ORDER — BISACODYL 10 MG
10 SUPPOSITORY, RECTAL RECTAL
Status: DISCONTINUED | OUTPATIENT
Start: 2017-04-22 | End: 2017-04-25 | Stop reason: HOSPADM

## 2017-04-22 RX ORDER — PROMETHAZINE HYDROCHLORIDE 25 MG/1
12.5-25 SUPPOSITORY RECTAL EVERY 4 HOURS PRN
Status: DISCONTINUED | OUTPATIENT
Start: 2017-04-22 | End: 2017-04-25 | Stop reason: HOSPADM

## 2017-04-22 RX ORDER — OXYCODONE HYDROCHLORIDE 5 MG/1
2.5 TABLET ORAL
Status: DISCONTINUED | OUTPATIENT
Start: 2017-04-22 | End: 2017-04-25

## 2017-04-22 RX ADMIN — LORAZEPAM 0.5 MG: 2 INJECTION INTRAMUSCULAR at 15:11

## 2017-04-22 RX ADMIN — ACETAMINOPHEN 650 MG: 325 TABLET, FILM COATED ORAL at 15:05

## 2017-04-22 RX ADMIN — BACITRACIN ZINC: 500 OINTMENT TOPICAL at 10:06

## 2017-04-22 RX ADMIN — ENOXAPARIN SODIUM 40 MG: 100 INJECTION SUBCUTANEOUS at 10:22

## 2017-04-22 RX ADMIN — ONDANSETRON 4 MG: 2 INJECTION INTRAMUSCULAR; INTRAVENOUS at 07:17

## 2017-04-22 RX ADMIN — VANCOMYCIN HYDROCHLORIDE 2600 MG: 100 INJECTION, POWDER, LYOPHILIZED, FOR SOLUTION INTRAVENOUS at 08:17

## 2017-04-22 RX ADMIN — OXYCODONE HYDROCHLORIDE 5 MG: 5 TABLET ORAL at 14:05

## 2017-04-22 RX ADMIN — AMPICILLIN SODIUM AND SULBACTAM SODIUM 3 G: 2; 1 INJECTION, POWDER, FOR SOLUTION INTRAMUSCULAR; INTRAVENOUS at 07:17

## 2017-04-22 RX ADMIN — AMPICILLIN SODIUM AND SULBACTAM SODIUM 3 G: 2; 1 INJECTION, POWDER, FOR SOLUTION INTRAMUSCULAR; INTRAVENOUS at 12:45

## 2017-04-22 RX ADMIN — MORPHINE SULFATE 2 MG: 4 INJECTION INTRAVENOUS at 15:05

## 2017-04-22 RX ADMIN — OXYCODONE HYDROCHLORIDE 5 MG: 5 TABLET ORAL at 10:21

## 2017-04-22 RX ADMIN — SODIUM CHLORIDE 3117 ML: 9 INJECTION, SOLUTION INTRAVENOUS at 07:17

## 2017-04-22 RX ADMIN — LORAZEPAM 0.5 MG: 1 TABLET ORAL at 21:17

## 2017-04-22 RX ADMIN — OXYCODONE HYDROCHLORIDE 5 MG: 5 TABLET ORAL at 18:19

## 2017-04-22 RX ADMIN — MORPHINE SULFATE 2 MG: 4 INJECTION INTRAVENOUS at 20:55

## 2017-04-22 RX ADMIN — BACITRACIN ZINC: 500 OINTMENT TOPICAL at 14:05

## 2017-04-22 RX ADMIN — AMPICILLIN SODIUM AND SULBACTAM SODIUM 3 G: 2; 1 INJECTION, POWDER, FOR SOLUTION INTRAMUSCULAR; INTRAVENOUS at 17:41

## 2017-04-22 RX ADMIN — HYDROMORPHONE HYDROCHLORIDE 1 MG: 1 INJECTION, SOLUTION INTRAMUSCULAR; INTRAVENOUS; SUBCUTANEOUS at 07:18

## 2017-04-22 RX ADMIN — VANCOMYCIN HYDROCHLORIDE 1300 MG: 100 INJECTION, POWDER, LYOPHILIZED, FOR SOLUTION INTRAVENOUS at 18:19

## 2017-04-22 RX ADMIN — POTASSIUM CHLORIDE 20 MEQ: 1500 TABLET, EXTENDED RELEASE ORAL at 10:22

## 2017-04-22 RX ADMIN — SODIUM CHLORIDE: 9 INJECTION, SOLUTION INTRAVENOUS at 10:01

## 2017-04-22 ASSESSMENT — LIFESTYLE VARIABLES
EVER_SMOKED: YES
DO YOU DRINK ALCOHOL: NO
ALCOHOL_USE: NO
DO YOU DRINK ALCOHOL: NO
EVER_SMOKED: YES

## 2017-04-22 ASSESSMENT — COPD QUESTIONNAIRES
DURING THE PAST 4 WEEKS HOW MUCH DID YOU FEEL SHORT OF BREATH: SOME OF THE TIME
COPD SCREENING SCORE: 4
HAVE YOU SMOKED AT LEAST 100 CIGARETTES IN YOUR ENTIRE LIFE: YES
DO YOU EVER COUGH UP ANY MUCUS OR PHLEGM?: YES, A FEW DAYS A WEEK OR MONTH

## 2017-04-22 ASSESSMENT — PAIN SCALES - GENERAL
PAINLEVEL_OUTOF10: 6
PAINLEVEL_OUTOF10: 4
PAINLEVEL_OUTOF10: 8
PAINLEVEL_OUTOF10: 5
PAINLEVEL_OUTOF10: 6
PAINLEVEL_OUTOF10: 6
PAINLEVEL_OUTOF10: 10
PAINLEVEL_OUTOF10: 8
PAINLEVEL_OUTOF10: 5
PAINLEVEL_OUTOF10: 7

## 2017-04-22 ASSESSMENT — PATIENT HEALTH QUESTIONNAIRE - PHQ9
1. LITTLE INTEREST OR PLEASURE IN DOING THINGS: NOT AT ALL
2. FEELING DOWN, DEPRESSED, IRRITABLE, OR HOPELESS: NOT AT ALL
SUM OF ALL RESPONSES TO PHQ9 QUESTIONS 1 AND 2: 0
SUM OF ALL RESPONSES TO PHQ QUESTIONS 1-9: 0

## 2017-04-22 NOTE — H&P
PRIMARY CARE PHYSICIAN:  Unassigned.    CHIEF COMPLAINT:  Worsening leg pain.    HISTORY OF PRESENT ILLNESS:  This is a homeless 31-year-old female with   history of methamphetamine IV skin popping.  She was recently seen at [facility], given some antibiotics and discharged with left lower leg cellulitis.    She states taking one day, but despite this has had nausea, vomiting, unable   to keep them down.  She has had worsening cellulitis surrounding her outline   of her recent cellulitis at outlying facility.  Patient is currently awake,   alert, moaning.  She does have pain in the left leg, it is swollen.  There is   redness going out of her prior ink marks that were drawn on her cellulitis.    There is a wound approximately 1 cm in size.  There is no current drainage.    Patient does have some subjective fevers, chills, leg pain.  She states   constipation, no diarrhea.  She has had some burning with urination.  Denies   any joint pains.  No headaches.    REVIEW OF SYSTEMS:  As stated above, otherwise, unremarkable per CMS/AMA   criteria.    ALLERGIES:  None known.    CURRENT MEDICATIONS:  She is supposed to be on Bactrim as well as Keflex;   however, she has not been able to keep these down.  She states she has only   taken one dose.    PAST MEDICAL HISTORY:  1.  Diabetes diagnosed in , not on medications, reportedly diet   controlled.  2.  History of migraines.    PAST SURGICAL HISTORY:  She has had bilateral knee surgery, laparoscopic    x2.    SOCIAL HISTORY:  She states she is homeless.  She has _____ voice.  She does   smoke cigarettes.  She uses illicit drugs.  Skin injects with her   methamphetamines.    FAMILY HISTORY:  Grandmother had diabetes.    ALLERGIES:  None known.    PHYSICAL EXAMINATION:  VITAL SIGNS:  Temperature is 100.2, pulse is 107, respiratory rate is 18,   blood pressure 106/62.  GENERAL:  This is a mildly overweight white female.  She is awake, alert and   conversant.   Speech is clear.  She is disheveled.  HEENT:  NCAT, EOMI, PERRL, sclerae are anicteric.  Nares are patent, moist   mucosa.  NECK:  Trachea is midline, no adenopathy.  LUNGS:  Clear to auscultation bilaterally.  No wheezes, no crackles, no   accessory muscle use.  CARDIOVASCULAR:  Regular rate and rhythm.  No murmurs, gallops or rubs.    Normal S1, S2.  ABDOMEN:  Soft, nontender, nondistended.  Positive bowel sounds.  EXTREMITIES:  Her left lower extremity has about the lower third a wound open   that is not currently draining.  There is some white sloughing skin over the   wound.  There is no fluctuance to it.  The area is red, erythematous.  It is   warm to touch to the surrounding skin.  Left leg is more swollen than the   right leg.  She has 2+ dorsalis pedal pulses, 2+ radial artery pulses.  No   clubbing or cyanosis of the digits.  No tremors.  Also note, she has at the   bra line and in her mid back has a wound as well.  It is not erythematous   though soft scab over the area.  She has multiple skin marks on her, appears   from either prior skin picking or other etiology, possible bedbugs.  No   bedbugs seen on her.  PSYCHIATRIC:  Normal affect.  She is following commands.  Little bit anxious.  SKIN:  The patient had the above findings on her leg.  NEUROLOGIC:  Cranial nerves II-XII grossly intact.    LABORATORY DATA:  WBC is 15.1, hemoglobin 14.1, hematocrit of 42.2, platelet   count is 219.  Basic metabolic panel shows sodium of 133, potassium 3.4,   chloride 99, CO2 of 24, BUN 16, creatinine 1.03.  Her glucose is 123.  Lactic   acid 1.4.    IMAGING:  Of note, she did have a ultrasound of her lower extremity on 03/16,   showed no sonographic evidence of abscess.  There is edema and soft tissue   cellulitis and different diagnosis and that was of the left leg back on March 16th.    ASSESSMENT AND PLAN:  1.  Left lower extremity cellulitis with failure of treatment and outpatient   therapy even despite just  having one antibiotic.  She is not able to keep   medications down, keep her admitted to the hospital for left lower cellulitis.    We will have blood cultures obtained and get a wound culture as well.  I   have started her on vancomycin and Unasyn.  I have antiemetics to try and   control her nausea and vomiting.  Once this is controlled, try and get her   discharged home.  I have wound care.  The patient may shower.  2.  History of diabetes mellitus, reportedly diet controlled.  Her last   hemoglobin A1c was 7 back in July 2014.  We will repeat another hemoglobin   A1c.  Monitor blood sugars.  3.  Hypokalemia.  Check magnesium.  Give her supplemental potassium.  4.  Homelessness.  5.  Methamphetamine/illicit drug use.  8.  Tobacco use.  We will have nicotine patch if needed.  Recommended smoking   cessation.       ____________________________________     DO RALPH HOFFMANN / AKIRA    DD:  04/22/2017 08:25:16  DT:  04/22/2017 09:32:44    D#:  [id number]  Job#:  [id number]

## 2017-04-22 NOTE — ED NOTES
Chief Complaint   Patient presents with   • Leg Pain     swelling and redness of LLE     Pt seen here yesterday for the same complaint at [facility] for same complaint yesterday and prescribed antibiotics.  Pt states that the redness and swelling in spreading and was instructed by [facility] to seek additional treatment if increased redness and swelling.  Pt has positive CMS.  Triage process explained to patient.  Pt back to waiting room.  Pt instructed to inform RN if any changes or questions arise.

## 2017-04-22 NOTE — ED NOTES
Pt to blue 15 from tai. Pt hunched over crying and moaning. Leg appears red and warm. Pt reports picking up abx yesterday and took them 1 time but threw them up yesterday. She has not been taking abx since then. ERP to see.

## 2017-04-22 NOTE — IP AVS SNAPSHOT
" Home Care Instructions                                                                                                                  Name:Kat Devlin  Medical Record Number:[id number]  CSN: [id number]    YOB: 1985   Age: 31 y.o.  Sex: female  HT:1.753 m (5' 9\") WT: 103 kg (227 lb 1.2 oz)          Admit Date: 4/22/2017     Discharge Date:   Today's Date: 4/25/2017  Attending Doctor:  Akash Osei M.D.                  Allergies:  Review of patient's allergies indicates no known allergies.            Discharge Instructions       Cellulitis  Cellulitis is an infection of the skin and the tissue under the skin. The infected area is usually red and tender. This happens most often in the arms and lower legs.  HOME CARE   · Take your antibiotic medicine as told. Finish the medicine even if you start to feel better.  · Keep the infected arm or leg raised (elevated).  · Put a warm cloth on the area up to 4 times per day.  · Only take medicines as told by your doctor.  · Keep all doctor visits as told.  GET HELP IF:  · You see red streaks on the skin coming from the infected area.  · Your red area gets bigger or turns a dark color.  · Your bone or joint under the infected area is painful after the skin heals.  · Your infection comes back in the same area or different area.  · You have a puffy (swollen) bump in the infected area.  · You have new symptoms.  · You have a fever.  GET HELP RIGHT AWAY IF:   · You feel very sleepy.  · You throw up (vomit) or have watery poop (diarrhea).  · You feel sick and have muscle aches and pains.  MAKE SURE YOU:   · Understand these instructions.  · Will watch your condition.  · Will get help right away if you are not doing well or get worse.     This information is not intended to replace advice given to you by your health care provider. Make sure you discuss any questions you have with your health care provider.     Document Released: 06/05/2009 Document Revised: " 01/08/2016 Document Reviewed: 03/04/2013  [employer] Interactive Patient Education ©2016 [employer]  Discharge Instructions    Discharged to home by car with self. Discharged via walking, hospital escort: Refused.  Special equipment needed: Not Applicable    Be sure to schedule a follow-up appointment with your primary care doctor or any specialists as instructed.     Discharge Plan:   Diet Plan: Discussed  Activity Level: Discussed  Smoking Cessation Offered: Patient Refused  Confirmed Follow up Appointment: Patient to Call and Schedule Appointment  Confirmed Symptoms Management: Discussed  Medication Reconciliation Updated: Yes  Influenza Vaccine Indication: Patient Refuses    I understand that a diet low in cholesterol, fat, and sodium is recommended for good health. Unless I have been given specific instructions below for another diet, I accept this instruction as my diet prescription.   Other diet: consistent carbohydrate    Special Instructions: None    · Is patient discharged on Warfarin / Coumadin?   No     · Is patient Post Blood Transfusion?  No    Depression / Suicide Risk    As you are discharged from this [facility] facility, it is important to learn how to keep safe from harming yourself.    Recognize the warning signs:  · Abrupt changes in personality, positive or negative- including increase in energy   · Giving away possessions  · Change in eating patterns- significant weight changes-  positive or negative  · Change in sleeping patterns- unable to sleep or sleeping all the time   · Unwillingness or inability to communicate  · Depression  · Unusual sadness, discouragement and loneliness  · Talk of wanting to die  · Neglect of personal appearance   · Rebelliousness- reckless behavior  · Withdrawal from people/activities they love  · Confusion- inability to concentrate     If you or a loved one observes any of these behaviors or has concerns about self-harm, here's what you can do:  · Talk about  it- your feelings and reasons for harming yourself  · Remove any means that you might use to hurt yourself (examples: pills, rope, extension cords, firearm)  · Get professional help from the community (Mental Health, Substance Abuse, psychological counseling)  · Do not be alone:Call your Safe Contact- someone whom you trust who will be there for you.  · Call your local CRISIS HOTLINE 721-0389 or 237-324-4177  · Call your local Children's Mobile Crisis Response Team [place] (857) 977-9249 or wwwPROFICIO  · Call the toll free National Suicide Prevention Hotlines   · National Suicide Prevention Lifeline 651-782-KNCR (7920)  · National Hope Line Network 800-SUICIDE (424-1140)    Opioid Withdrawal  Opioids are a group of narcotic drugs. They include the street drug heroin. They also include pain medicines, such as morphine, hydrocodone, oxycodone, and fentanyl. Opioid withdrawal is a group of characteristic physical and mental signs and symptoms. It typically occurs if you have been using opioids daily for several weeks or longer and stop using or rapidly decrease use. Opioid withdrawal can also occur if you have used opioids daily for a long time and are given a medicine to block the effect.   SIGNS AND SYMPTOMS  Opioid withdrawal includes three or more of the following symptoms:   · Depressed, anxious, or irritable mood.  · Nausea or vomiting.  · Muscle aches or spasms.    · Watery eyes.     · Runny nose.  · Dilated pupils, sweating, or hairs standing on end.  · Diarrhea or intestinal cramping.  · Yawning.    · Fever.  · Increased blood pressure.  · Fast pulse.  · Restlessness or trouble sleeping.  These signs and symptoms occur within several hours of stopping or reducing short-acting opioids, such as heroin. They can occur within 3 days of stopping or reducing long-acting opioids, such as methadone. Withdrawal begins within minutes of receiving a drug that blocks the effects of opioids, such as  naltrexone or naloxone.  DIAGNOSIS   Opioid use disorder is diagnosed by your health care provider. You will be asked about your symptoms, drug and alcohol use, medical history, and use of medicines. A physical exam may be done. Lab tests may be ordered. Your health care provider may have you see a mental health professional.   TREATMENT   The treatment for opioid withdrawal is usually provided by medical doctors with special training in substance use disorders (addiction specialists). The following medicines may be included in treatment:  · Opioids given in place of the abused opioid. They turn on opioid receptors in the brain and lessen or prevent withdrawal symptoms. They are gradually decreased (opioid substitution and taper).  · Non-opioids that can lessen certain opioid withdrawal symptoms. They may be used alone or with opioid substitution and taper.  Successful long-term recovery usually requires medicine, counseling, and group support.  HOME CARE INSTRUCTIONS   · Take medicines only as directed by your health care provider.  · Check with your health care provider before starting new medicines.  · Keep all follow-up visits as directed by your health care provider.  SEEK MEDICAL CARE IF:  · You are not able to take your medicines as directed.  · Your symptoms get worse.  · You relapse.  SEEK IMMEDIATE MEDICAL CARE IF:  · You have serious thoughts about hurting yourself or others.  · You have a seizure.  · You lose consciousness.     This information is not intended to replace advice given to you by your health care provider. Make sure you discuss any questions you have with your health care provider.     Document Released: 12/20/2004 Document Revised: 01/08/2016 Document Reviewed: 12/31/2014  [employer] Interactive Patient Education ©2016 [employer]      Your appointments     Apr 28, 2017  3:30 PM   New Patient with Miguelangel Mancera M.D.   [facility] ([facility])    [address] [place] [address]  [id number] [phone]           Please bring Photo ID, Insurance Cards, All Medication Bottles and copies of any legal documents (such as Living Will, Power of ) If speaking a language besides English please bring an adult . Please arrive 30 minutes prior for check in and registration. You will be receiving a confirmation call a few days before your appointment from our automated call confirmation system.              Follow-up Information     1. Follow up with Miguelangel Mancera M.D.. Go on 4/28/2017.    Specialty:  Family Medicine    Why:  For wound check and to establish a PCP    Contact information    [address] [address] [phone]          2. Call Outpatient detox center and drug abuse program.        3. Schedule an appointment as soon as possible for a visit with diabetic education.    Contact information    1. [employer] [facility] [phone]   Located at [address], [address]  2. [facility] [phone]   Located at [address] [facility] [address]         Discharge Medication Instructions:    Below are the medications your physician expects you to take upon discharge:    Review all your home medications and newly ordered medications with your doctor and/or pharmacist. Follow medication instructions as directed by your doctor and/or pharmacist.    Please keep your medication list with you and share with your physician.               Medication List      START taking these medications        Instructions    Morning Afternoon Evening Bedtime    acetaminophen 325 MG Tabs   Last time this was given:  650 mg on 4/24/2017  6:22 PM   Commonly known as:  TYLENOL        Take 2 Tabs by mouth every 6 hours as needed.   Dose:  650 mg                        amoxicillin-clavulanate 875-125 MG Tabs   Commonly known as:  AUGMENTIN        Take 1 Tab by mouth every 12 hours for 13 doses.   Dose:  1 Tab                        bacitracin 500 UNIT/GM Oint    Last time this was given:  4/25/2017 10:26 AM        Apply 1 Each to affected area(s) 3 times a day.   Dose:  1 Each                        doxycycline monohydrate 100 MG tablet   Commonly known as:  ADOXA        Take 1 Tab by mouth every 12 hours for 13 doses.   Dose:  100 mg                        tramadol 50 MG Tabs   Commonly known as:  ULTRAM        Take 1 Tab by mouth every 6 hours as needed for Moderate Pain.   Dose:  50 mg                             Where to Get Your Medications      Information about where to get these medications is not yet available     ! Ask your nurse or doctor about these medications    - amoxicillin-clavulanate 875-125 MG Tabs  - doxycycline monohydrate 100 MG tablet  - tramadol 50 MG Tabs            Instructions           Diet / Nutrition:    Follow any diet instructions given to you by your doctor or the dietician, including how much salt (sodium) you are allowed each day.    If you are overweight, talk to your doctor about a weight reduction plan.    Activity:    Remain physically active following your doctor's instructions about exercise and activity.    Rest often.     Any time you become even a little tired or short of breath, SIT DOWN and rest.    Worsening Symptoms:    Report any of the following signs and symptoms to the doctor's office immediately:    *Pain of jaw, arm, or neck  *Chest pain not relieved by medication                               *Dizziness or loss of consciousness  *Difficulty breathing even when at rest   *More tired than usual                                       *Bleeding drainage or swelling of surgical site  *Swelling of feet, ankles, legs or stomach                 *Fever (>100ºF)  *Pink or blood tinged sputum  *Weight gain (3lbs/day or 5lbs /week)           *Shock from internal defibrillator (if applicable)  *Palpitations or irregular heartbeats                *Cool and/or numb extremities    Stroke Awareness    Common Risk Factors for Stroke  include:    Age  Atrial Fibrillation  Carotid Artery Stenosis  Diabetes Mellitus  Excessive alcohol consumption  High blood pressure  Overweight   Physical inactivity  Smoking    Warning signs and symptoms of a stroke include:    *Sudden numbness or weakness of the face, arm or leg (especially on one side of the body).  *Sudden confusion, trouble speaking or understanding.  *Sudden trouble seeing in one or both eyes.  *Sudden trouble walking, dizziness, loss of balance or coordination.Sudden severe headache with no known cause.    It is very important to get treatment quickly when a stroke occurs. If you experience any of the above warning signs, call 911 immediately.                   Disclaimer         Quit Smoking / Tobacco Use:    I understand the use of any tobacco products increases my chance of suffering from future heart disease or stroke and could cause other illnesses which may shorten my life. Quitting the use of tobacco products is the single most important thing I can do to improve my health. For further information on smoking / tobacco cessation call a Toll Free Quit Line at 1-767.353.3214 (*National [employer] [place]) or 1-411.218.8475 (American Lung Association) or you can access the web based program at www.lungusa.org.    [place] Tobacco Users Help Line:  (689) 261-4315       Toll Free: 1-543.949.2380  Quit Tobacco Program [facility] (760)626-1231    Crisis Hotline:    [place] Crisis Hotline:  0-587-KOICJDA or 1-779.618.1768    [place] Crisis Hotline:    1-510.597.1755 or 840-485-8109    Discharge Survey:   Thank you for choosing [facility]. We hope we did everything we could to make your hospital stay a pleasant one. You may be receiving a phone survey and we would appreciate your time and participation in answering the questions. Your input is very valuable to us in our efforts to improve our service to our patients and their families.        My signature on this form  indicates that:    1. I have reviewed and understand the above information.  2. My questions regarding this information have been answered to my satisfaction.  3. I have formulated a plan with my discharge nurse to obtain my prescribed medications for home.                  Disclaimer         __________________________________                     __________       ________                       Patient Signature                                                 Date                    Time

## 2017-04-22 NOTE — ED NOTES
Patient is resting comfortably in bed. Pt speech is mumbled. According to night shift nurse that is normal for her.

## 2017-04-22 NOTE — IP AVS SNAPSHOT
" <p align=\"LEFT\"><IMG SRC=\"//[email or web address]$/[email or web address]\" alt=\"Image\" WIDTH=\"50%\" HEIGHT=\"200\" BORDER=\"\"></p>                   Name:Kat Devlin  Medical Record Number:[id number]  CSN: [id number]    YOB: 1985   Age: 31 y.o.  Sex: female  HT:1.753 m (5' 9\") WT: 103 kg (227 lb 1.2 oz)          Admit Date: 4/22/2017     Discharge Date:   Today's Date: 4/25/2017  Attending Doctor:  Akash Osei M.D.                  Allergies:  Review of patient's allergies indicates no known allergies.          Your appointments     Apr 28, 2017  3:30 PM   New Patient with Miguelangel Mancera M.D.   [facility]    [address] [place] [id number] [phone]           Please bring Photo ID, Insurance Cards, All Medication Bottles and copies of any legal documents (such as Living Will, Power of ) If speaking a language besides English please bring an adult . Please arrive 30 minutes prior for check in and registration. You will be receiving a confirmation call a few days before your appointment from our automated call confirmation system.              Follow-up Information     1. Follow up with Miguealngel Mancera M.D.. Go on 4/28/2017.    Specialty:  Family Medicine    Why:  For wound check and to establish a PCP    Contact information    [address] [place] [id number] [phone]          2. Call Outpatient detox center and drug abuse program.        3. Schedule an appointment as soon as possible for a visit with diabetic education.    Contact information    1. [employer] [facility] [phone]   Located at [address], [address]  2. [facility] [phone]   Located at [address] [facility], [address]         Medication List      Take these Medications        Instructions    acetaminophen 325 MG Tabs   Commonly known as:  TYLENOL    Take 2 Tabs by mouth every 6 hours as needed.   Dose:  650 mg       amoxicillin-clavulanate " 875-125 MG Tabs   Commonly known as:  AUGMENTIN    Take 1 Tab by mouth every 12 hours for 13 doses.   Dose:  1 Tab       bacitracin 500 UNIT/GM Oint    Apply 1 Each to affected area(s) 3 times a day.   Dose:  1 Each       doxycycline monohydrate 100 MG tablet   Commonly known as:  ADOXA    Take 1 Tab by mouth every 12 hours for 13 doses.   Dose:  100 mg       tramadol 50 MG Tabs   Commonly known as:  ULTRAM    Take 1 Tab by mouth every 6 hours as needed for Moderate Pain.   Dose:  50 mg

## 2017-04-22 NOTE — ED NOTES
Pt name called in lobby and senior lounge with no answer.  Security states that they last saw pt leaving lobby and calling an ambulance to go to [facility].

## 2017-04-22 NOTE — ED NOTES
Patient denies taking any prescription or OTC medications  States she took Bactrim about a month ago for same leg infection

## 2017-04-22 NOTE — IP AVS SNAPSHOT
4/25/2017    Kat Devlin  [address] [place] [address]    Dear Kat:    Critical access hospital wants to ensure your discharge home is safe and you or your loved ones have had all of your questions answered regarding your care after you leave the hospital.    Below is a list of resources and contact information should you have any questions regarding your hospital stay, follow-up instructions, or active medical symptoms.    Questions or Concerns Regarding… Contact   Medical Questions Related to Your Discharge  (7 days a week, 8am-5pm) Contact a Nurse Care Coordinator   [phone]   Medical Questions Not Related to Your Discharge  (24 hours a day / 7 days a week)  Contact the Nurse Health Line   [phone]    Medications or Discharge Instructions Refer to your discharge packet   or contact your [facility] Primary Care Provider   [phone]   Follow-up Appointment(s) Schedule your appointment via RealRider   or contact Scheduling [phone]   Billing Review your statement via RealRider  or contact Billing [phone]   Medical Records Review your records via RealRider   or contact Medical Records [phone]     You may receive a telephone call within two days of discharge. This call is to make certain you understand your discharge instructions and have the opportunity to have any questions answered. You can also easily access your medical information, test results and upcoming appointments via the RealRider free online health management tool. You can learn more and sign up at [place]/RealRider. For assistance setting up your RealRider account, please call [phone].    Once again, we want to ensure your discharge home is safe and that you have a clear understanding of any next steps in your care. If you have any questions or concerns, please do not hesitate to contact us, we are here for you. Thank you for choosing [facility] for your healthcare needs.    Sincerely,    Your [facility] Healthcare Team

## 2017-04-22 NOTE — ED PROVIDER NOTES
ED Provider Note    Scribed for Roque Loomis M.D. by Camila Mckinnon. 2017  6:18 AM    Primary care provider: Pcp Pt States None  Means of arrival: Walk-in   History obtained from: Patient   History limited by: None     CHIEF COMPLAINT  Chief Complaint   Patient presents with   • Leg Pain     swelling and redness of LLE       HPI  Kat Devlin is a 31 y.o. female who presents to the Emergency Department for left leg pain, redness, and swelling onset yesterday. The patient was seen at [place] yesterday and was prescribed Bactrim and Keflex. She states that she has taken one dose of these antibiotics but vomited them up. Per patient, the redness and swelling has worsened since yesterday and she was told to seek medical attention if this happened. Patient denies fevers, chills, nausea, vomiting, diarrhea.      REVIEW OF SYSTEMS  Pertinent positives include left leg pain, redness, and swelling.   Pertinent negatives include no fevers, chills, nausea, vomiting, diarrhea.    All other systems reviewed and negative.      C    PAST MEDICAL HISTORY   has a past medical history of Migraines; Obesity; Back pain (2011); Headache(784.0) (2011); Pain (2012); Arthritis; Type II or unspecified type diabetes mellitus without mention of complication, not stated as uncontrolled; Psychiatric disorder; ASTHMA; and PCOS (polycystic ovarian syndrome).    SURGICAL HISTORY   has past surgical history that includes  delivery only; repeat c section (2012); other; other (2014); tubal coagulation laparoscopic bilateral (2015); dilation and curettage (2015); and hysteroscopy novasure-2 (2015).    SOCIAL HISTORY  Social History   Substance Use Topics   • Smoking status: Current Every Day Smoker -- 0.50 packs/day for 18 years     Types: Cigarettes   • Smokeless tobacco: Never Used   • Alcohol Use: No      History   Drug Use   • Yes   • Special: Intravenous     Comment: Meth       FAMILY  "HISTORY  Family History   Problem Relation Age of Onset   • Non-contributory Mother    • Hyperlipidemia Mother    • Non-contributory Father    • Alcohol/Drug Father    • Arthritis Maternal Grandmother    • Hyperlipidemia Maternal Grandmother    • Heart Disease Maternal Grandfather        CURRENT MEDICATIONS  Home Medications     No current facility-administered medications on file prior to encounter.     Current Outpatient Prescriptions on File Prior to Encounter   Medication Sig Dispense Refill   • oxycodone immediate-release (ROXICODONE) 5 MG Tab Take 1 Tab by mouth every four hours as needed. 20 Tab 0   • ondansetron (ZOFRAN ODT) 4 MG TABLET DISPERSIBLE Take 1 Tab by mouth every four hours as needed for Nausea/Vomiting (give PO if no IV route available). 10 Tab 0              ALLERGIES  No Known Allergies    PHYSICAL EXAM  VITAL SIGNS: /62 mmHg  Pulse 87  Temp(Src) 37.9 °C (100.2 °F)  Resp 18  Ht 1.753 m (5' 9.02\")  Wt 103.874 kg (229 lb)  BMI 33.80 kg/m2  SpO2 98%    Nursing note and vitals reviewed.  Constitutional: Moderate distress secondary to pain. Chronically ill-appearing.   HENT: Head is normocephalic and atraumatic. Oropharynx is clear and moist without exudate or erythema.   Eyes: Pupils are equal, round, and reactive to light. Conjunctiva are normal.   Cardiovascular: Normal rate and regular rhythm. No murmur heard. Normal radial pulses.  Pulmonary/Chest: Breath sounds normal. No wheezes or rales.   Abdominal: Soft and non-tender. No distention    Musculoskeletal: Extremities exhibit normal range of motion without edema.   Neurological: Awake, alert and oriented to person, place, and time. No focal deficits noted.  Skin: Skin is warm and dry. No rash. 15 cm x 12 cm area of erythema to the left lower leg with a central eschar, red and hot to the touch. Border drawn and erythema has extended substantially beyond the border.   Psychiatric: Normal mood and affect. Appropriate for clinical " situation    DIAGNOSTIC STUDIES / PROCEDURES    LABS  Results for orders placed or performed during the hospital encounter of 04/22/17   CBC WITH DIFFERENTIAL   Result Value Ref Range    WBC 15.1 (H) 4.8 - 10.8 K/uL    RBC 4.46 4.20 - 5.40 M/uL    Hemoglobin 14.1 12.0 - 16.0 g/dL    Hematocrit 42.2 37.0 - 47.0 %    MCV 94.6 81.4 - 97.8 fL    MCH 31.6 27.0 - 33.0 pg    MCHC 33.4 (L) 33.6 - 35.0 g/dL    RDW 44.8 35.9 - 50.0 fL    Platelet Count 219 164 - 446 K/uL    MPV 8.9 (L) 9.0 - 12.9 fL    Nucleated RBC 0.00 /100 WBC    NRBC (Absolute) 0.00 K/uL    Neutrophils-Polys 58.90 44.00 - 72.00 %    Lymphocytes 33.00 22.00 - 41.00 %    Monocytes 4.50 0.00 - 13.40 %    Eosinophils 0.90 0.00 - 6.90 %    Basophils 0.00 0.00 - 1.80 %    Neutrophils (Absolute) 9.30 (H) 2.00 - 7.15 K/uL    Lymphs (Absolute) 4.98 (H) 1.00 - 4.80 K/uL    Monos (Absolute) 0.68 0.00 - 0.85 K/uL    Eos (Absolute) 0.14 0.00 - 0.51 K/uL    Baso (Absolute) 0.00 0.00 - 0.12 K/uL   BASIC METABOLIC PANEL   Result Value Ref Range    Sodium 133 (L) 135 - 145 mmol/L    Potassium 3.4 (L) 3.6 - 5.5 mmol/L    Chloride 99 96 - 112 mmol/L    Co2 24 20 - 33 mmol/L    Glucose 123 (H) 65 - 99 mg/dL    Bun 16 8 - 22 mg/dL    Creatinine 1.03 0.50 - 1.40 mg/dL    Calcium 9.1 8.5 - 10.5 mg/dL    Anion Gap 10.0 0.0 - 11.9   ESTIMATED GFR   Result Value Ref Range    GFR If African American >60 >60 mL/min/1.73 m 2    GFR If Non African American >60 >60 mL/min/1.73 m 2   DIFFERENTIAL MANUAL   Result Value Ref Range    Bands-Stabs 2.70 0.00 - 10.00 %    Manual Diff Status PERFORMED    PERIPHERAL SMEAR REVIEW   Result Value Ref Range    Peripheral Smear Review see below    PLATELET ESTIMATE   Result Value Ref Range    Plt Estimation Normal    MORPHOLOGY   Result Value Ref Range    RBC Morphology Present     Smudge Cells Few    LACTIC ACID   Result Value Ref Range    Lactic Acid 1.4 0.5 - 2.0 mmol/L   All labs reviewed by me.    COURSE & MEDICAL DECISION MAKING  Nursing notes,  VS, PMSFHx reviewed in chart.     Review of past medical records shows the patient came to the ED 2 days ago but left without being seen.      6:18 AM - Patient seen and examined at bedside. Patient will be treated with 3 g Unasyn IV, vancomycin IV, 1 mg Dilaudid IV, 4 mg Zofran IV, 3,117 mL NS IV. Ordered for labs to evaluate her symptoms. The differential diagnoses include but are not limited to: cellulitis, no evidence of abscess. Due to her inability to tolerate oral antibiotics and worsening infection, the patient will be admitted. The plan of care was discussed with the patient and I answered all of her questions at this time. The patient understands and is agreeable with this plan of care.      7:37 AM Consulted with Dr. Medeiros (hospitalist) to further discuss the details of the patient's case at this time. Dr. Medeiros will admit the patient.       CRITICAL CARE  I provided critical care services, which included medication orders, frequent reevaluations of the patient's condition and response to treatment, ordering and reviewing test results, and discussing the case with various consultants.  The critical care time associated with the care of the patient was 35 minutes. Review chart for interventions. This time is exclusive of any other billable procedures.       DISPOSITION:  Patient will be admitted to Dr. Medeiros (hospitalist) in guarded condition.     FINAL IMPRESSION  1. Cellulitis of left lower extremity    2. Sepsis, due to unspecified organism (CMS-HCC)       Critical care time of 35 minutes, as outlined above.     Camila HARRIS), am scribing for, and in the presence of, Roque Loomis M.D..    Electronically signed by: Camila Martines), 4/22/2017    Roque HARRIS M.D. personally performed the services described in this documentation, as scribed by Camila Mckinnon in my presence, and it is both accurate and complete.    The note accurately reflects work and decisions  made by me.  Roque Loomis  4/22/2017  11:22 AM

## 2017-04-22 NOTE — ED NOTES
Blood drawn and sent to lab.  Apologized for long wait.  Instructed pt to contact ER staff is deciding to leave ER or has any questions or concerns.  Pt back to lobby.

## 2017-04-22 NOTE — IP AVS SNAPSHOT
SystematicBytes Access Code: [email or web address]  Expires: 5/25/2017  1:05 PM    Your email address is not on file at [facility].  Email Addresses are required for you to sign up for SystematicBytes, please contact [phone] to verify your personal information and to provide your email address prior to attempting to register for SystematicBytes.    Kat Devlin  [address] [place] [address]    SystematicBytes  A secure, online tool to manage your health information     [facility]’s SystematicBytes® is a secure, online tool that connects you to your personalized health information from the privacy of your home -- day or night - making it very easy for you to manage your healthcare. Once the activation process is completed, you can even access your medical information using the SystematicBytes gisella, which is available for free in the Apple Gisella store or Google Play store.     To learn more about SystematicBytes, visit [email or web address]    There are two levels of access available (as shown below):   My Chart Features  [facility] Primary Care Doctor [facility]  Specialists [facility]  Urgent  Care Non-[facility] Primary Care Doctor   Email your healthcare team securely and privately 24/7 X X X    Manage appointments: schedule your next appointment; view details of past/upcoming appointments X      Request prescription refills. X      View recent personal medical records, including lab and immunizations X X X X   View health record, including health history, allergies, medications X X X X   Read reports about your outpatient visits, procedures, consult and ER notes X X X X   See your discharge summary, which is a recap of your hospital and/or ER visit that includes your diagnosis, lab results, and care plan X X  X     How to register for [employer]:  Once your e-mail address has been verified, follow the following steps to sign up for SystematicBytes.     1. Go to  [email or web address]  2. Click on the Sign Up Now box, which takes you to the New Member Sign Up page. You will need  to provide the following information:  a. Enter your iSpye Access Code exactly as it appears at the top of this page. (You will not need to use this code after you’ve completed the sign-up process. If you do not sign up before the expiration date, you must request a new code.)   b. Enter your date of birth.   c. Enter your home email address.   d. Click Submit, and follow the next screen’s instructions.  3. Create a iSpye ID. This will be your iSpye login ID and cannot be changed, so think of one that is secure and easy to remember.  4. Create a iSpye password. You can change your password at any time.  5. Enter your Password Reset Question and Answer. This can be used at a later time if you forget your password.   6. Enter your e-mail address. This allows you to receive e-mail notifications when new information is available in iSpye.  7. Click Sign Up. You can now view your health information.    For assistance activating your iSpye account, call [phone]

## 2017-04-22 NOTE — ED NOTES
Report called to floor. Pt resting in bed. Oxygen sats dip down when sleeping. Placed on two liters NC when sleeping but pt is more alert and breathing more effectively

## 2017-04-22 NOTE — PROGRESS NOTES
"Pharmacy Kinetics 31 y.o. female on vancomycin day # 1 2017    Currently on Vancomycin new start    Indication for Treatment: SSTI    Pertinent history per medical record: Admitted on 2017 for LLE cellulitis.  Patient has a history of IVDU, had recently been evaluated at [place].  She was prescribed antibiotics, but was unable to keep them down. Physical exam significant for 1 cm wound on LLE without purulent drainage. Antibiotics initiated for presumed cellulitis.     Other antibiotics: Unasyn 3 g IV q6h    Allergies: Review of patient's allergies indicates no known allergies.     List concerns for renal function: BMI ~ 33    Pertinent cultures to date:    PBC x 2: in process    Recent Labs      17   0354   WBC  15.1*   NEUTSPOLYS  58.90   BANDSSTABS  2.70     Recent Labs      17   0354   BUN  16   CREATININE  1.03     Blood pressure 108/62, pulse 72, temperature 37.4 °C (99.3 °F), resp. rate 18, height 1.753 m (5' 9\"), weight 103 kg (227 lb 1.2 oz), SpO2 92 %, not currently breastfeeding. Temp (24hrs), Av.7 °C (99.8 °F), Min:37.4 °C (99.3 °F), Max:37.9 °C (100.2 °F)      A/P   1. Vancomycin dose change: initiate 13 mg/kg q8h  2. Next vancomycin level:  @ 0730  3. Goal trough: 12-16 mcg/mL  4. Comments: new start vancomycin for SSTI. Patient does have some risk factors for renal insult and accumulation. Will attempt dosing per protocol for now and check a level prior to the 4th total dose. Blood cultures in process. Afebile, but leukocytosis.  Will continue to follow.     Halle Noble, PHARMD      "

## 2017-04-23 PROBLEM — F15.10 METHAMPHETAMINE ABUSE (HCC): Status: ACTIVE | Noted: 2017-04-23

## 2017-04-23 LAB
ANION GAP SERPL CALC-SCNC: 9 MMOL/L (ref 0–11.9)
BASOPHILS # BLD AUTO: 0 % (ref 0–1.8)
BASOPHILS # BLD: 0 K/UL (ref 0–0.12)
BUN SERPL-MCNC: 9 MG/DL (ref 8–22)
CALCIUM SERPL-MCNC: 7.5 MG/DL (ref 8.5–10.5)
CHLORIDE SERPL-SCNC: 107 MMOL/L (ref 96–112)
CO2 SERPL-SCNC: 21 MMOL/L (ref 20–33)
CREAT SERPL-MCNC: 0.59 MG/DL (ref 0.5–1.4)
EOSINOPHIL # BLD AUTO: 0 K/UL (ref 0–0.51)
EOSINOPHIL NFR BLD: 0 % (ref 0–6.9)
ERYTHROCYTE [DISTWIDTH] IN BLOOD BY AUTOMATED COUNT: 45.2 FL (ref 35.9–50)
GFR SERPL CREATININE-BSD FRML MDRD: >60 ML/MIN/1.73 M 2
GLUCOSE SERPL-MCNC: 95 MG/DL (ref 65–99)
HCT VFR BLD AUTO: 33.5 % (ref 37–47)
HGB BLD-MCNC: 11.3 G/DL (ref 12–16)
LYMPHOCYTES # BLD AUTO: 3.79 K/UL (ref 1–4.8)
LYMPHOCYTES NFR BLD: 37.2 % (ref 22–41)
MANUAL DIFF BLD: NORMAL
MCH RBC QN AUTO: 31.5 PG (ref 27–33)
MCHC RBC AUTO-ENTMCNC: 32.9 G/DL (ref 33.6–35)
MCV RBC AUTO: 95.8 FL (ref 81.4–97.8)
MONOCYTES # BLD AUTO: 0.45 K/UL (ref 0–0.85)
MONOCYTES NFR BLD AUTO: 4.4 % (ref 0–13.4)
MORPHOLOGY BLD-IMP: NORMAL
NEUTROPHILS # BLD AUTO: 5.96 K/UL (ref 2–7.15)
NEUTROPHILS NFR BLD: 57.5 % (ref 44–72)
NEUTS BAND NFR BLD MANUAL: 0.9 % (ref 0–10)
NRBC # BLD AUTO: 0 K/UL
NRBC BLD AUTO-RTO: 0 /100 WBC
PLATELET # BLD AUTO: 184 K/UL (ref 164–446)
PLATELET BLD QL SMEAR: NORMAL
PMV BLD AUTO: 9 FL (ref 9–12.9)
POTASSIUM SERPL-SCNC: 3.6 MMOL/L (ref 3.6–5.5)
RBC # BLD AUTO: 3.55 M/UL (ref 4.2–5.4)
RBC BLD AUTO: PRESENT
SODIUM SERPL-SCNC: 137 MMOL/L (ref 135–145)
VANCOMYCIN TROUGH SERPL-MCNC: 15.9 UG/ML (ref 10–20)
VARIANT LYMPHS BLD QL SMEAR: NORMAL
WBC # BLD AUTO: 10.2 K/UL (ref 4.8–10.8)

## 2017-04-23 PROCEDURE — 770021 HCHG ROOM/CARE - ISO PRIVATE

## 2017-04-23 PROCEDURE — 700102 HCHG RX REV CODE 250 W/ 637 OVERRIDE(OP): Performed by: HOSPITALIST

## 2017-04-23 PROCEDURE — 85027 COMPLETE CBC AUTOMATED: CPT

## 2017-04-23 PROCEDURE — 80202 ASSAY OF VANCOMYCIN: CPT

## 2017-04-23 PROCEDURE — 87177 OVA AND PARASITES SMEARS: CPT

## 2017-04-23 PROCEDURE — 700111 HCHG RX REV CODE 636 W/ 250 OVERRIDE (IP): Performed by: HOSPITALIST

## 2017-04-23 PROCEDURE — 700105 HCHG RX REV CODE 258: Performed by: HOSPITALIST

## 2017-04-23 PROCEDURE — 80048 BASIC METABOLIC PNL TOTAL CA: CPT

## 2017-04-23 PROCEDURE — 36415 COLL VENOUS BLD VENIPUNCTURE: CPT

## 2017-04-23 PROCEDURE — 700105 HCHG RX REV CODE 258

## 2017-04-23 PROCEDURE — 85007 BL SMEAR W/DIFF WBC COUNT: CPT

## 2017-04-23 PROCEDURE — A9270 NON-COVERED ITEM OR SERVICE: HCPCS | Performed by: HOSPITALIST

## 2017-04-23 PROCEDURE — 700111 HCHG RX REV CODE 636 W/ 250 OVERRIDE (IP)

## 2017-04-23 PROCEDURE — 99233 SBSQ HOSP IP/OBS HIGH 50: CPT | Performed by: INTERNAL MEDICINE

## 2017-04-23 RX ADMIN — BACITRACIN ZINC 1 EACH: 500 OINTMENT TOPICAL at 09:23

## 2017-04-23 RX ADMIN — MORPHINE SULFATE 2 MG: 4 INJECTION INTRAVENOUS at 20:57

## 2017-04-23 RX ADMIN — SODIUM CHLORIDE: 9 INJECTION, SOLUTION INTRAVENOUS at 09:12

## 2017-04-23 RX ADMIN — MORPHINE SULFATE 2 MG: 4 INJECTION INTRAVENOUS at 04:20

## 2017-04-23 RX ADMIN — VANCOMYCIN HYDROCHLORIDE 1300 MG: 100 INJECTION, POWDER, LYOPHILIZED, FOR SOLUTION INTRAVENOUS at 09:11

## 2017-04-23 RX ADMIN — VANCOMYCIN HYDROCHLORIDE 1300 MG: 100 INJECTION, POWDER, LYOPHILIZED, FOR SOLUTION INTRAVENOUS at 00:30

## 2017-04-23 RX ADMIN — OXYCODONE HYDROCHLORIDE 2.5 MG: 5 TABLET ORAL at 17:44

## 2017-04-23 RX ADMIN — ACETAMINOPHEN 650 MG: 325 TABLET, FILM COATED ORAL at 00:31

## 2017-04-23 RX ADMIN — AMPICILLIN SODIUM AND SULBACTAM SODIUM 3 G: 2; 1 INJECTION, POWDER, FOR SOLUTION INTRAMUSCULAR; INTRAVENOUS at 05:21

## 2017-04-23 RX ADMIN — OXYCODONE HYDROCHLORIDE 5 MG: 5 TABLET ORAL at 00:09

## 2017-04-23 RX ADMIN — OXYCODONE HYDROCHLORIDE 5 MG: 5 TABLET ORAL at 20:57

## 2017-04-23 RX ADMIN — OXYCODONE HYDROCHLORIDE 5 MG: 5 TABLET ORAL at 10:30

## 2017-04-23 RX ADMIN — AMPICILLIN SODIUM AND SULBACTAM SODIUM 3 G: 2; 1 INJECTION, POWDER, FOR SOLUTION INTRAMUSCULAR; INTRAVENOUS at 00:27

## 2017-04-23 RX ADMIN — ENOXAPARIN SODIUM 40 MG: 100 INJECTION SUBCUTANEOUS at 09:11

## 2017-04-23 RX ADMIN — OXYCODONE HYDROCHLORIDE 5 MG: 5 TABLET ORAL at 02:43

## 2017-04-23 RX ADMIN — LORAZEPAM 0.5 MG: 1 TABLET ORAL at 20:57

## 2017-04-23 RX ADMIN — LORAZEPAM 0.5 MG: 2 INJECTION INTRAMUSCULAR at 00:33

## 2017-04-23 RX ADMIN — BACITRACIN ZINC: 500 OINTMENT TOPICAL at 20:57

## 2017-04-23 RX ADMIN — AMPICILLIN SODIUM AND SULBACTAM SODIUM 3 G: 2; 1 INJECTION, POWDER, FOR SOLUTION INTRAMUSCULAR; INTRAVENOUS at 11:52

## 2017-04-23 RX ADMIN — BACITRACIN ZINC: 500 OINTMENT TOPICAL at 15:06

## 2017-04-23 RX ADMIN — MORPHINE SULFATE 2 MG: 4 INJECTION INTRAVENOUS at 00:31

## 2017-04-23 RX ADMIN — AMPICILLIN SODIUM AND SULBACTAM SODIUM 3 G: 2; 1 INJECTION, POWDER, FOR SOLUTION INTRAMUSCULAR; INTRAVENOUS at 17:45

## 2017-04-23 RX ADMIN — VANCOMYCIN HYDROCHLORIDE 1300 MG: 100 INJECTION, POWDER, LYOPHILIZED, FOR SOLUTION INTRAVENOUS at 15:06

## 2017-04-23 ASSESSMENT — ENCOUNTER SYMPTOMS
MEMORY LOSS: 0
FLANK PAIN: 0
FOCAL WEAKNESS: 0
BACK PAIN: 0
MYALGIAS: 1
SEIZURES: 0
FEVER: 0
DIZZINESS: 0
HEADACHES: 0
SHORTNESS OF BREATH: 0
CHILLS: 0
NAUSEA: 0
DIAPHORESIS: 0
TREMORS: 0
NERVOUS/ANXIOUS: 0
COUGH: 0
ABDOMINAL PAIN: 0
PALPITATIONS: 0
WEAKNESS: 1

## 2017-04-23 ASSESSMENT — PAIN SCALES - GENERAL
PAINLEVEL_OUTOF10: 8
PAINLEVEL_OUTOF10: 9
PAINLEVEL_OUTOF10: 8
PAINLEVEL_OUTOF10: ASSUMED PAIN PRESENT
PAINLEVEL_OUTOF10: 10
PAINLEVEL_OUTOF10: 4
PAINLEVEL_OUTOF10: 10
PAINLEVEL_OUTOF10: 4
PAINLEVEL_OUTOF10: 10

## 2017-04-23 ASSESSMENT — LIFESTYLE VARIABLES: SUBSTANCE_ABUSE: 1

## 2017-04-23 NOTE — PROGRESS NOTES
"Pharmacy Kinetics 31 y.o. female on vancomycin day # 2 2017    Currently on Vancomycin 1300 mg iv q8hr    Indication for Treatment: SSTI    Pertinent history per medical record: Admitted on 2017 for LLE cellulitis.  Patient has a history of IVDU, had recently been evaluated at [place].  She was prescribed po antibiotics (Bactrim?), but was unable to keep them down. Physical exam significant for 1 cm wound on LLE without purulent drainage. Antibiotics initiated for presumed cellulitis.     Other antibiotics: Unasyn 3g IV q6h    Allergies: Review of patient's allergies indicates no known allergies.     List concerns for renal function: obesity (BMI = 33.5    Pertinent cultures to date:    PBC x 2: NGTD    Recent Labs      17   0354  17   0205   WBC  15.1*  10.2   NEUTSPOLYS  58.90  57.50   BANDSSTABS  2.70  0.90     Recent Labs      17   0354  17   0205   BUN  16  9   CREATININE  1.03  0.59     Recent Labs      17   0717   VANCOTROUGH  15.9     Intake/Output Summary (Last 24 hours) at 17 1155  Last data filed at 17 1500   Gross per 24 hour   Intake   1111 ml   Output      0 ml   Net   1111 ml      Blood pressure 104/55, pulse 81, temperature 37.4 °C (99.4 °F), resp. rate 22, height 1.753 m (5' 9\"), weight 103 kg (227 lb 1.2 oz), SpO2 94 %, not currently breastfeeding. Temp (24hrs), Av.3 °C (101 °F), Min:37 °C (98.6 °F), Max:39.4 °C (102.9 °F)      A/P   1. Vancomycin dose change: none indicated  2. Next vancomycin level: ~ 2 days   3. Goal trough: 12-16 mcg/mL  4. Comments: Trough drawn prior to 4th dose in therapeutic range; trough collected ~ 1.5 hours early so true trough likely lower. Renal indices improved with fluids since admission. Anticipate trough at steady state to be in therapeutic range. Continue to monitor renal function and check trough in ~ 2 days to evaluate antibiotic clearance. Pharmacy will continue to monitor.    Eliseo Rasmussen, " PharmD

## 2017-04-23 NOTE — PROGRESS NOTES
Assumed care of patient at 0700.  Report received from night shift.  Patient greeted and assessed.  Denies needs at this time.

## 2017-04-23 NOTE — PROGRESS NOTES
Hospital Medicine Progress Note, Adult, Complex               Author: Shawnee Oliva Date & Time created: 4/23/2017  9:16 AM     Interval History:  31F h/o meth abuse, with LLE cellulitis, fever    4/23 + LLE pain, ttp, afebrile    Review of Systems:  Review of Systems   Constitutional: Positive for malaise/fatigue. Negative for fever, chills and diaphoresis.   HENT: Negative for congestion and hearing loss.    Respiratory: Negative for cough and shortness of breath.    Cardiovascular: Positive for leg swelling. Negative for chest pain and palpitations.   Gastrointestinal: Negative for nausea and abdominal pain.   Genitourinary: Negative for dysuria and flank pain.   Musculoskeletal: Positive for myalgias. Negative for back pain.   Neurological: Positive for weakness. Negative for dizziness, tremors, focal weakness, seizures and headaches.   Psychiatric/Behavioral: Positive for substance abuse. Negative for memory loss. The patient is not nervous/anxious.        Physical Exam:  Physical Exam   Constitutional: She is oriented to person, place, and time. She appears well-nourished. No distress.   obese   HENT:   Head: Normocephalic and atraumatic.   Nose: Nose normal.   Eyes: EOM are normal. Pupils are equal, round, and reactive to light.   Neck: Neck supple. No JVD present. No thyromegaly present.   Cardiovascular: Normal rate and intact distal pulses.    No murmur heard.  Pulmonary/Chest: Effort normal and breath sounds normal. No respiratory distress. She has no wheezes.   Abdominal: Soft. Bowel sounds are normal. She exhibits no distension. There is no tenderness.   Musculoskeletal: She exhibits edema and tenderness.   Enlarging erythema LLE   Neurological: She is alert and oriented to person, place, and time. No cranial nerve deficit. She exhibits normal muscle tone. Coordination normal.   Skin: Skin is warm and dry. No rash noted. She is not diaphoretic. There is erythema.   Diffuse healing scabs     Psychiatric:  Thought content normal.   Nursing note and vitals reviewed.      Labs:        Invalid input(s): [id number]      Recent Labs      17   0354  17   0205   SODIUM  133*  137   POTASSIUM  3.4*  3.6   CHLORIDE  99  107   CO2  24  21   BUN  16  9   CREATININE  1.03  0.59   MAGNESIUM  1.9   --    CALCIUM  9.1  7.5*     Recent Labs      17   0354  17   0205   GLUCOSE  123*  95     Recent Labs      17   0354  17   0205   RBC  4.46  3.55*   HEMOGLOBIN  14.1  11.3*   HEMATOCRIT  42.2  33.5*   PLATELETCT  219  184     Recent Labs      17   0354  17   0205   WBC  15.1*  10.2   NEUTSPOLYS  58.90  57.50   LYMPHOCYTES  33.00  37.20   MONOCYTES  4.50  4.40   EOSINOPHILS  0.90  0.00   BASOPHILS  0.00  0.00           Hemodynamics:  Temp (24hrs), Av.3 °C (100.9 °F), Min:37 °C (98.6 °F), Max:39.4 °C (102.9 °F)  Temperature: 37.4 °C (99.4 °F)  Pulse  Av.4  Min: 72  Max: 110   Blood Pressure: 104/55 mmHg     Respiratory:    Respiration: (!) 22, Pulse Oximetry: 94 %        RUL Breath Sounds: Clear, RML Breath Sounds: Clear, RLL Breath Sounds: Clear, KIRSTIN Breath Sounds: Clear, LLL Breath Sounds: Clear  Fluids:    Intake/Output Summary (Last 24 hours) at 17 0916  Last data filed at 17 1500   Gross per 24 hour   Intake   1111 ml   Output      0 ml   Net   1111 ml     Weight: 103 kg (227 lb 1.2 oz)  GI/Nutrition:  Orders Placed This Encounter   Procedures   • Diet Order     Standing Status: Standing      Number of Occurrences: 1      Standing Expiration Date:      Order Specific Question:  Diet:     Answer:  Regular [1]     Medical Decision Making, by Problem:  Active Hospital Problems    Diagnosis   • *Cellulitis [L03.90]  F/u cx  -cont vanco/unasyn  - pain control  - avoid IV  - f/u LE dopplers, r/o DVT, abscess  If ongoing pain, may need CT LE   • Methamphetamine abuse [F15.10]h/o     • Type 2 diabetes mellitus with hyperglycemia (CMS-HCC) [E11.65]   • Hx of opioid abuse  [Z87.898]   • Morbid obesity with BMI of 40.0-44.9, adult (CMS-[facility]) [E66.01, Z68.41]   F/u am labs    Dispo- pending    Labs reviewed, Radiology images reviewed and Medications reviewed        DVT Prophylaxis: Enoxaparin (Lovenox)    Ulcer prophylaxis: Not indicated  Antibiotics: Treating active infection/contamination beyond 24 hours perioperative coverage  Assessed for rehab: Patient was assess for and/or received rehabilitation services during this hospitalization

## 2017-04-23 NOTE — PROGRESS NOTES
"Patient drowsy on arrival to floor. Vitals stable. Patient will wake to voice. Patient states she is \"in a lot of pain\". Pain per patient is in the left leg. Treated per EMAR.   This afternoon patient had a spike in temperature to 102F. Tylenol given per EMAR. Temperature rechecked 2 hours later, temperature remains 102F. Dr. Oliva notified. Ordered to start patient on cooling blanket. Dr. Oliva also ordered another dose of Tylenol to be given if the patient's temperature did not improve after an hour on the cooling blanket (1800). Charge nurse made aware. Night shift RN made aware.   Fluids encouraged for patient.   Patient able to get up to walk to bathroom with walker and much encouragement. Voided large amount. Patient states she does not want to eat dinner. Patient continues to be drowsy (waking to voice).   "

## 2017-04-23 NOTE — PROGRESS NOTES
Patient lying in bed eyes closed, in no apparent distress. Patient is easily roused with calling name, and gentle shaking shoulder. Patient is pleasant and appropriate in speech, reports feeling hot, and in pain. Patient is oriented to call light, safety protocol, and room. Will continue to monitor.

## 2017-04-23 NOTE — CARE PLAN
Problem: Infection  Goal: Will remain free from infection  Outcome: PROGRESSING AS EXPECTED  Antibiotics ordered. Blood cultures done in ER    Problem: Venous Thromboembolism (VTW)/Deep Vein Thrombosis (DVT) Prevention:  Goal: Patient will participate in Venous Thrombosis (VTE)/Deep Vein Thrombosis (DVT)Prevention Measures  Outcome: PROGRESSING AS EXPECTED  Patient agrees to wear one SCD (on the leg that doesn't cause pain).

## 2017-04-24 LAB
ANION GAP SERPL CALC-SCNC: 6 MMOL/L (ref 0–11.9)
BACTERIA UR CULT: NORMAL
BASOPHILS # BLD AUTO: 0.9 % (ref 0–1.8)
BASOPHILS # BLD: 0.05 K/UL (ref 0–0.12)
BUN SERPL-MCNC: 8 MG/DL (ref 8–22)
CALCIUM SERPL-MCNC: 7.6 MG/DL (ref 8.5–10.5)
CHLORIDE SERPL-SCNC: 105 MMOL/L (ref 96–112)
CO2 SERPL-SCNC: 23 MMOL/L (ref 20–33)
CREAT SERPL-MCNC: 0.56 MG/DL (ref 0.5–1.4)
EOSINOPHIL # BLD AUTO: 0 K/UL (ref 0–0.51)
EOSINOPHIL NFR BLD: 0 % (ref 0–6.9)
ERYTHROCYTE [DISTWIDTH] IN BLOOD BY AUTOMATED COUNT: 44 FL (ref 35.9–50)
GFR SERPL CREATININE-BSD FRML MDRD: >60 ML/MIN/1.73 M 2
GLUCOSE SERPL-MCNC: 235 MG/DL (ref 65–99)
HCT VFR BLD AUTO: 31.9 % (ref 37–47)
HGB BLD-MCNC: 10.8 G/DL (ref 12–16)
LYMPHOCYTES # BLD AUTO: 2.45 K/UL (ref 1–4.8)
LYMPHOCYTES NFR BLD: 40.9 % (ref 22–41)
MANUAL DIFF BLD: NORMAL
MCH RBC QN AUTO: 32 PG (ref 27–33)
MCHC RBC AUTO-ENTMCNC: 33.9 G/DL (ref 33.6–35)
MCV RBC AUTO: 94.4 FL (ref 81.4–97.8)
METAMYELOCYTES NFR BLD MANUAL: 0.9 %
MONOCYTES # BLD AUTO: 0.62 K/UL (ref 0–0.85)
MONOCYTES NFR BLD AUTO: 10.4 % (ref 0–13.4)
MORPHOLOGY BLD-IMP: NORMAL
NEUTROPHILS # BLD AUTO: 2.81 K/UL (ref 2–7.15)
NEUTROPHILS NFR BLD: 46.9 % (ref 44–72)
NRBC # BLD AUTO: 0 K/UL
NRBC BLD AUTO-RTO: 0 /100 WBC
O+P SPEC MICRO: NORMAL
PLATELET # BLD AUTO: 186 K/UL (ref 164–446)
PLATELET BLD QL SMEAR: NORMAL
PMV BLD AUTO: 8.9 FL (ref 9–12.9)
POTASSIUM SERPL-SCNC: 3.2 MMOL/L (ref 3.6–5.5)
RBC # BLD AUTO: 3.38 M/UL (ref 4.2–5.4)
RBC BLD AUTO: NORMAL
SIGNIFICANT IND 70042: NORMAL
SIGNIFICANT IND 70042: NORMAL
SITE SITE: NORMAL
SITE SITE: NORMAL
SODIUM SERPL-SCNC: 134 MMOL/L (ref 135–145)
SOURCE SOURCE: NORMAL
SOURCE SOURCE: NORMAL
WBC # BLD AUTO: 6 K/UL (ref 4.8–10.8)

## 2017-04-24 PROCEDURE — 99233 SBSQ HOSP IP/OBS HIGH 50: CPT | Performed by: INTERNAL MEDICINE

## 2017-04-24 PROCEDURE — 80048 BASIC METABOLIC PNL TOTAL CA: CPT

## 2017-04-24 PROCEDURE — A9270 NON-COVERED ITEM OR SERVICE: HCPCS | Performed by: HOSPITALIST

## 2017-04-24 PROCEDURE — 700102 HCHG RX REV CODE 250 W/ 637 OVERRIDE(OP): Performed by: INTERNAL MEDICINE

## 2017-04-24 PROCEDURE — 85007 BL SMEAR W/DIFF WBC COUNT: CPT

## 2017-04-24 PROCEDURE — A9270 NON-COVERED ITEM OR SERVICE: HCPCS | Performed by: INTERNAL MEDICINE

## 2017-04-24 PROCEDURE — 700111 HCHG RX REV CODE 636 W/ 250 OVERRIDE (IP)

## 2017-04-24 PROCEDURE — 770006 HCHG ROOM/CARE - MED/SURG/GYN SEMI*

## 2017-04-24 PROCEDURE — 700105 HCHG RX REV CODE 258

## 2017-04-24 PROCEDURE — 700111 HCHG RX REV CODE 636 W/ 250 OVERRIDE (IP): Performed by: HOSPITALIST

## 2017-04-24 PROCEDURE — 85027 COMPLETE CBC AUTOMATED: CPT

## 2017-04-24 PROCEDURE — 36415 COLL VENOUS BLD VENIPUNCTURE: CPT

## 2017-04-24 PROCEDURE — 700105 HCHG RX REV CODE 258: Performed by: HOSPITALIST

## 2017-04-24 PROCEDURE — 700102 HCHG RX REV CODE 250 W/ 637 OVERRIDE(OP): Performed by: HOSPITALIST

## 2017-04-24 PROCEDURE — 700105 HCHG RX REV CODE 258: Performed by: INTERNAL MEDICINE

## 2017-04-24 PROCEDURE — 93971 EXTREMITY STUDY: CPT

## 2017-04-24 RX ORDER — POTASSIUM CHLORIDE 20 MEQ/1
20 TABLET, EXTENDED RELEASE ORAL 2 TIMES DAILY
Status: DISCONTINUED | OUTPATIENT
Start: 2017-04-24 | End: 2017-04-25 | Stop reason: HOSPADM

## 2017-04-24 RX ADMIN — VANCOMYCIN HYDROCHLORIDE 1300 MG: 100 INJECTION, POWDER, LYOPHILIZED, FOR SOLUTION INTRAVENOUS at 00:26

## 2017-04-24 RX ADMIN — OXYCODONE HYDROCHLORIDE 5 MG: 5 TABLET ORAL at 00:26

## 2017-04-24 RX ADMIN — OXYCODONE HYDROCHLORIDE 5 MG: 5 TABLET ORAL at 03:54

## 2017-04-24 RX ADMIN — OXYCODONE HYDROCHLORIDE 2.5 MG: 5 TABLET ORAL at 09:44

## 2017-04-24 RX ADMIN — VANCOMYCIN HYDROCHLORIDE 1300 MG: 100 INJECTION, POWDER, LYOPHILIZED, FOR SOLUTION INTRAVENOUS at 08:20

## 2017-04-24 RX ADMIN — VANCOMYCIN HYDROCHLORIDE 1300 MG: 100 INJECTION, POWDER, LYOPHILIZED, FOR SOLUTION INTRAVENOUS at 16:21

## 2017-04-24 RX ADMIN — BACITRACIN ZINC: 500 OINTMENT TOPICAL at 15:12

## 2017-04-24 RX ADMIN — ACETAMINOPHEN 650 MG: 325 TABLET, FILM COATED ORAL at 09:44

## 2017-04-24 RX ADMIN — LORAZEPAM 0.5 MG: 1 TABLET ORAL at 16:17

## 2017-04-24 RX ADMIN — DIPHENHYDRAMINE HCL 25 MG: 25 TABLET ORAL at 20:45

## 2017-04-24 RX ADMIN — POTASSIUM CHLORIDE 20 MEQ: 1500 TABLET, EXTENDED RELEASE ORAL at 20:45

## 2017-04-24 RX ADMIN — BACITRACIN ZINC: 500 OINTMENT TOPICAL at 08:25

## 2017-04-24 RX ADMIN — SODIUM CHLORIDE: 9 INJECTION, SOLUTION INTRAVENOUS at 20:48

## 2017-04-24 RX ADMIN — MORPHINE SULFATE 2 MG: 4 INJECTION INTRAVENOUS at 00:27

## 2017-04-24 RX ADMIN — OXYCODONE HYDROCHLORIDE 5 MG: 5 TABLET ORAL at 23:59

## 2017-04-24 RX ADMIN — AMPICILLIN SODIUM AND SULBACTAM SODIUM 3 G: 2; 1 INJECTION, POWDER, FOR SOLUTION INTRAMUSCULAR; INTRAVENOUS at 11:58

## 2017-04-24 RX ADMIN — ACETAMINOPHEN 650 MG: 325 TABLET, FILM COATED ORAL at 18:22

## 2017-04-24 RX ADMIN — AMPICILLIN SODIUM AND SULBACTAM SODIUM 3 G: 2; 1 INJECTION, POWDER, FOR SOLUTION INTRAMUSCULAR; INTRAVENOUS at 05:46

## 2017-04-24 RX ADMIN — POTASSIUM CHLORIDE 20 MEQ: 1500 TABLET, EXTENDED RELEASE ORAL at 12:15

## 2017-04-24 RX ADMIN — MORPHINE SULFATE 2 MG: 4 INJECTION INTRAVENOUS at 03:55

## 2017-04-24 RX ADMIN — ENOXAPARIN SODIUM 40 MG: 100 INJECTION SUBCUTANEOUS at 08:20

## 2017-04-24 RX ADMIN — AMPICILLIN SODIUM AND SULBACTAM SODIUM 3 G: 2; 1 INJECTION, POWDER, FOR SOLUTION INTRAMUSCULAR; INTRAVENOUS at 18:28

## 2017-04-24 RX ADMIN — AMPICILLIN SODIUM AND SULBACTAM SODIUM 3 G: 2; 1 INJECTION, POWDER, FOR SOLUTION INTRAMUSCULAR; INTRAVENOUS at 00:26

## 2017-04-24 RX ADMIN — LORAZEPAM 0.5 MG: 2 INJECTION INTRAMUSCULAR at 18:23

## 2017-04-24 RX ADMIN — OXYCODONE HYDROCHLORIDE 5 MG: 5 TABLET ORAL at 13:19

## 2017-04-24 RX ADMIN — OXYCODONE HYDROCHLORIDE 5 MG: 5 TABLET ORAL at 18:22

## 2017-04-24 ASSESSMENT — ENCOUNTER SYMPTOMS
DEPRESSION: 0
HEARTBURN: 0
CHILLS: 0
PALPITATIONS: 0
SHORTNESS OF BREATH: 0
FLANK PAIN: 0
FEVER: 0
TREMORS: 0
ABDOMINAL PAIN: 0
NAUSEA: 0
BACK PAIN: 0
FOCAL WEAKNESS: 0
NERVOUS/ANXIOUS: 0
MYALGIAS: 1
DIAPHORESIS: 0
WEAKNESS: 1

## 2017-04-24 ASSESSMENT — LIFESTYLE VARIABLES
DO YOU DRINK ALCOHOL: NO
SUBSTANCE_ABUSE: 1

## 2017-04-24 ASSESSMENT — PAIN SCALES - GENERAL
PAINLEVEL_OUTOF10: 9
PAINLEVEL_OUTOF10: 8
PAINLEVEL_OUTOF10: 9
PAINLEVEL_OUTOF10: 10

## 2017-04-24 NOTE — PROGRESS NOTES
Patient's mother called attending nurse to bring to staff attention that while showering, patient discovered two small bugs thought to be bedbugs in her hair. Attending nurse saved bug for charge nurse, and proper identification. Followed procedures laid out by charge nurse to deal with situation per protocol. Patient roommate was showered, and moved to another clean room. Patient belongings were bagged and sealed with tape, all linens were changed. Patient will be reshowered and moved to a clean room. Will continue to monitor,

## 2017-04-24 NOTE — PROGRESS NOTES
Patient up to shower, pain is a 9 of 10. Patient is alert and oriented, able to answer all questions appropriately.

## 2017-04-24 NOTE — CARE PLAN
Problem: Safety  Goal: Will remain free from injury  Outcome: PROGRESSING AS EXPECTED  Assist to bathroom with walker, ambulation encouraged and educated on benefits but patient refusing due to pain/anxiety     Problem: Bowel/Gastric:  Goal: Normal bowel function is maintained or improved  Outcome: PROGRESSING AS EXPECTED  Patient with large formed bm despite abx and narcotic therapy

## 2017-04-24 NOTE — PROGRESS NOTES
Notified by primary RN that bug was found in pt's hair during shower. This RN found one small red colored bug in wash cloth, captured in specimen cup.  New gown and linens changed when escorted back to bed. Informed pt and family that precaution measures will be taken for safety, and that visitor clothing and belongings need to be thoroughly checked for bugs, the pt will be placed on isolation precautions, pt's belongings cannot leave the room, and that pt's belongings needed to be double bagged and taped. Pt then rummaging through her belongings after the fact she had showered. Family found wandering [address] with pt's belongings, family was not agreeable with releasing belongings, educated them again about preventing the spread of bed bugs, family then agreeable to releasing pt's belongings, double bagged and taped shut. Informed primary RN to page on call hospitalist to notify of new findings. Multiple bruises and scabs noted all over pt's body.      Complete O&P ordered to ID bug. Sent to lab, notified lab and micro of specimen.   Isolation precautions ordered.   Suzanne bed bug information given to pt.   NAM and EVS notified of possible bed bugs.   Left message [phone] for infection control.

## 2017-04-24 NOTE — PROGRESS NOTES
Patient lying in bed, covers over her head, unresponsive to questions or gentle shake of the shoulder. Attending nurse addressed patient in a loud clear voice and asked if she was in pain and needed pain medication. Patient immediately responded she was hurting and wanted pain meds. Attending nurse questioned patient on whether she was unaware when staff was trying to address her, or if she was annoyed and choosing to ignore staff. She stated that no one understood how she felt, and it was pointless to interact with staff because she couldn't get pain medication anyway. Attending nurse explained to patient that since she refused to acknowledge DrAna Rosa When he came to see her, he assessed her as being over sedated and wanted her pain meds held until she was able to prove to be alert enough to safely receive them. Patient agreed to acknowledge staff when addressed, and in return, attending nurse explained we could make an accurate estimation as to safe administration of sedating medications. Will continue to closely monitor patient.

## 2017-04-24 NOTE — PROGRESS NOTES
Hospital Medicine Progress Note, Adult, Complex               Author: Shawnee Oliva Date & Time created: 4/24/2017  1:55 PM     Interval History:  31F h/o meth abuse, with LLE cellulitis, fever    4/23 + LLE pain, ttp, afebrile  4/24  Improving LE pain, erythema/edema improving, afebrile    Review of Systems:  Review of Systems   Constitutional: Negative for fever, chills, malaise/fatigue and diaphoresis.   HENT: Negative for congestion and hearing loss.    Respiratory: Negative for shortness of breath.    Cardiovascular: Positive for leg swelling. Negative for palpitations.   Gastrointestinal: Negative for heartburn, nausea and abdominal pain.   Genitourinary: Negative for frequency and flank pain.   Musculoskeletal: Positive for myalgias. Negative for back pain.   Neurological: Positive for weakness. Negative for tremors and focal weakness.   Psychiatric/Behavioral: Positive for substance abuse. Negative for depression. The patient is not nervous/anxious.        Physical Exam:  Physical Exam   Constitutional: She is oriented to person, place, and time. She appears well-nourished. No distress.   obese   HENT:   Head: Normocephalic and atraumatic.   Nose: Nose normal.   Eyes: EOM are normal. Pupils are equal, round, and reactive to light. No scleral icterus.   Neck: Neck supple. No thyromegaly present.   Cardiovascular: Normal rate and intact distal pulses.    Pulmonary/Chest: Effort normal and breath sounds normal. No respiratory distress. She has no wheezes.   Abdominal: Soft. Bowel sounds are normal. She exhibits no distension and no mass.   Musculoskeletal: She exhibits edema and tenderness.   Enlarging erythema LLE   Neurological: She is alert and oriented to person, place, and time. No cranial nerve deficit. Coordination normal.   Skin: Skin is warm and dry. There is erythema.   Diffuse healing scabs     Psychiatric: Thought content normal.   Nursing note and vitals reviewed.      Labs:        Invalid input(s):  [id number]      Recent Labs      17   0600   SODIUM  133*  137  134*   POTASSIUM  3.4*  3.6  3.2*   CHLORIDE  99  107  105   CO2     BUN  16  9  8   CREATININE  1.03  0.59  0.56   MAGNESIUM  1.9   --    --    CALCIUM  9.1  7.5*  7.6*     Recent Labs      17   0600   GLUCOSE  123*  95  235*     Recent Labs      17   0600   RBC  4.46  3.55*  3.38*   HEMOGLOBIN  14.1  11.3*  10.8*   HEMATOCRIT  42.2  33.5*  31.9*   PLATELETCT  219  184  186     Recent Labs      17   0600   WBC  15.1*  10.2  6.0   NEUTSPOLYS  58.90  57.50  46.90   LYMPHOCYTES  33.00  37.20  40.90   MONOCYTES  4.50  4.40  10.40   EOSINOPHILS  0.90  0.00  0.00   BASOPHILS  0.00  0.00  0.90           Hemodynamics:  Temp (24hrs), Av.7 °C (99.9 °F), Min:37 °C (98.6 °F), Max:38.7 °C (101.7 °F)  Temperature: 37.1 °C (98.7 °F)  Pulse  Av.4  Min: 72  Max: 110   Blood Pressure: 110/67 mmHg     Respiratory:    Respiration: (!) 22, Pulse Oximetry: 93 %        RUL Breath Sounds: Clear, RML Breath Sounds: Clear, RLL Breath Sounds: Clear, KIRSTIN Breath Sounds: Clear, LLL Breath Sounds: Clear  Fluids:  No intake or output data in the 24 hours ending 17 1355     GI/Nutrition:  Orders Placed This Encounter   Procedures   • Diet Order     Standing Status: Standing      Number of Occurrences: 1      Standing Expiration Date:      Order Specific Question:  Diet:     Answer:  Regular [1]     Medical Decision Making, by Problem:  Active Hospital Problems    Diagnosis   • *Cellulitis [L03.90] improving  - cx- neg  -cont vanco/unasyn  - pain control  - avoid IV  - f/u LE dopplers, r/o DVT, abscess  If ongoing pain, may need CT LE   • Methamphetamine abuse [F15.10]h/o     • Type 2 diabetes mellitus with hyperglycemia (CMS-HCC) [E11.65]   • Hx of opioid abuse [Z87.898]   • Morbid obesity with BMI  of 40.0-44.9, adult (CMS-HCC) [E66.01, Z68.41]   Hypokalemia- start k replacement    F/u am labs    Dispo- pending  Encourage OOB    Labs reviewed, Radiology images reviewed and Medications reviewed        DVT Prophylaxis: Enoxaparin (Lovenox)    Ulcer prophylaxis: Not indicated  Antibiotics: Treating active infection/contamination beyond 24 hours perioperative coverage  Assessed for rehab: Patient was assess for and/or received rehabilitation services during this hospitalization

## 2017-04-24 NOTE — PROGRESS NOTES
"Patient reassessed for pain control after 5mg oxy given - states needing additional medication, anxiety, withdrawing, tremors visible, 0.5 ativan given, offered non-pharmacological pain interventions but patient stated that they were \"silly and she just wants to leave here and score\"  "

## 2017-04-24 NOTE — PROGRESS NOTES
Patient continues to ask for IV pain meds.  I informed her of the doctor's orders to avoid IV pain meds.  She has received 2 doses of oxycodone today.  First dose 5mg @ 1030, second dose 2.5mg @1745.  She continues to be in and out of sleep and is increasingly agitated when awake.

## 2017-04-24 NOTE — PROGRESS NOTES
"Pt showered with staff, new gown and socks provided. Escorted from shower directly to new room. Pt's belongings remain the in the room, bagged and taped shut. Informed pt will not receive her belongings until EVS has inspected them and contact the  for appropriate environmental interventions, pt agreeable. \"DO NOT ENTER\" sign placed on old room, bed control notified to block. No linens or equipment has been removed from the room.   "

## 2017-04-25 VITALS
SYSTOLIC BLOOD PRESSURE: 122 MMHG | BODY MASS INDEX: 33.63 KG/M2 | HEART RATE: 84 BPM | RESPIRATION RATE: 18 BRPM | OXYGEN SATURATION: 93 % | TEMPERATURE: 99.2 F | HEIGHT: 69 IN | DIASTOLIC BLOOD PRESSURE: 78 MMHG | WEIGHT: 227.07 LBS

## 2017-04-25 PROBLEM — F15.10 METHAMPHETAMINE ABUSE (HCC): Chronic | Status: ACTIVE | Noted: 2017-04-23

## 2017-04-25 PROBLEM — E87.6 HYPOKALEMIA: Status: ACTIVE | Noted: 2017-04-25

## 2017-04-25 PROBLEM — L03.90 CELLULITIS: Status: RESOLVED | Noted: 2017-04-22 | Resolved: 2017-04-25

## 2017-04-25 PROBLEM — E87.6 HYPOKALEMIA: Status: RESOLVED | Noted: 2017-04-25 | Resolved: 2017-04-25

## 2017-04-25 LAB
ANION GAP SERPL CALC-SCNC: 6 MMOL/L (ref 0–11.9)
ANION GAP SERPL CALC-SCNC: 7 MMOL/L (ref 0–11.9)
BUN SERPL-MCNC: 6 MG/DL (ref 8–22)
BUN SERPL-MCNC: 6 MG/DL (ref 8–22)
CALCIUM SERPL-MCNC: 8.3 MG/DL (ref 8.5–10.5)
CALCIUM SERPL-MCNC: 8.4 MG/DL (ref 8.5–10.5)
CHLORIDE SERPL-SCNC: 106 MMOL/L (ref 96–112)
CHLORIDE SERPL-SCNC: 106 MMOL/L (ref 96–112)
CO2 SERPL-SCNC: 24 MMOL/L (ref 20–33)
CO2 SERPL-SCNC: 25 MMOL/L (ref 20–33)
CREAT SERPL-MCNC: 0.53 MG/DL (ref 0.5–1.4)
CREAT SERPL-MCNC: 0.53 MG/DL (ref 0.5–1.4)
ERYTHROCYTE [DISTWIDTH] IN BLOOD BY AUTOMATED COUNT: 42.1 FL (ref 35.9–50)
GFR SERPL CREATININE-BSD FRML MDRD: >60 ML/MIN/1.73 M 2
GFR SERPL CREATININE-BSD FRML MDRD: >60 ML/MIN/1.73 M 2
GLUCOSE SERPL-MCNC: 90 MG/DL (ref 65–99)
GLUCOSE SERPL-MCNC: 95 MG/DL (ref 65–99)
HCT VFR BLD AUTO: 33.2 % (ref 37–47)
HGB BLD-MCNC: 11.4 G/DL (ref 12–16)
MCH RBC QN AUTO: 31.7 PG (ref 27–33)
MCHC RBC AUTO-ENTMCNC: 34.3 G/DL (ref 33.6–35)
MCV RBC AUTO: 92.2 FL (ref 81.4–97.8)
PLATELET # BLD AUTO: 222 K/UL (ref 164–446)
PMV BLD AUTO: 8.3 FL (ref 9–12.9)
POTASSIUM SERPL-SCNC: 3.6 MMOL/L (ref 3.6–5.5)
POTASSIUM SERPL-SCNC: 3.6 MMOL/L (ref 3.6–5.5)
RBC # BLD AUTO: 3.6 M/UL (ref 4.2–5.4)
SODIUM SERPL-SCNC: 137 MMOL/L (ref 135–145)
SODIUM SERPL-SCNC: 137 MMOL/L (ref 135–145)
VANCOMYCIN TROUGH SERPL-MCNC: 15 UG/ML (ref 10–20)
WBC # BLD AUTO: 6.4 K/UL (ref 4.8–10.8)

## 2017-04-25 PROCEDURE — 700102 HCHG RX REV CODE 250 W/ 637 OVERRIDE(OP): Performed by: NURSE PRACTITIONER

## 2017-04-25 PROCEDURE — A9270 NON-COVERED ITEM OR SERVICE: HCPCS | Performed by: NURSE PRACTITIONER

## 2017-04-25 PROCEDURE — 700111 HCHG RX REV CODE 636 W/ 250 OVERRIDE (IP): Performed by: HOSPITALIST

## 2017-04-25 PROCEDURE — 80048 BASIC METABOLIC PNL TOTAL CA: CPT

## 2017-04-25 PROCEDURE — 99239 HOSP IP/OBS DSCHRG MGMT >30: CPT | Performed by: HOSPITALIST

## 2017-04-25 PROCEDURE — 87641 MR-STAPH DNA AMP PROBE: CPT

## 2017-04-25 PROCEDURE — 700111 HCHG RX REV CODE 636 W/ 250 OVERRIDE (IP)

## 2017-04-25 PROCEDURE — 36415 COLL VENOUS BLD VENIPUNCTURE: CPT

## 2017-04-25 PROCEDURE — 85027 COMPLETE CBC AUTOMATED: CPT

## 2017-04-25 PROCEDURE — A9270 NON-COVERED ITEM OR SERVICE: HCPCS | Performed by: HOSPITALIST

## 2017-04-25 PROCEDURE — 700105 HCHG RX REV CODE 258

## 2017-04-25 PROCEDURE — 80202 ASSAY OF VANCOMYCIN: CPT

## 2017-04-25 PROCEDURE — 700105 HCHG RX REV CODE 258: Performed by: HOSPITALIST

## 2017-04-25 PROCEDURE — A9270 NON-COVERED ITEM OR SERVICE: HCPCS | Performed by: INTERNAL MEDICINE

## 2017-04-25 PROCEDURE — 700102 HCHG RX REV CODE 250 W/ 637 OVERRIDE(OP): Performed by: HOSPITALIST

## 2017-04-25 PROCEDURE — 700102 HCHG RX REV CODE 250 W/ 637 OVERRIDE(OP): Performed by: INTERNAL MEDICINE

## 2017-04-25 RX ORDER — TRAMADOL HYDROCHLORIDE 50 MG/1
50 TABLET ORAL EVERY 6 HOURS PRN
Qty: 15 TAB | Refills: 0 | Status: SHIPPED | OUTPATIENT
Start: 2017-04-25 | End: 2017-05-06

## 2017-04-25 RX ORDER — ACETAMINOPHEN 325 MG/1
650 TABLET ORAL EVERY 6 HOURS PRN
COMMUNITY
Start: 2017-04-25 | End: 2017-05-06

## 2017-04-25 RX ORDER — MORPHINE SULFATE 4 MG/ML
1 INJECTION, SOLUTION INTRAMUSCULAR; INTRAVENOUS EVERY 4 HOURS PRN
Status: DISCONTINUED | OUTPATIENT
Start: 2017-04-25 | End: 2017-04-25

## 2017-04-25 RX ORDER — AMOXICILLIN AND CLAVULANATE POTASSIUM 875; 125 MG/1; MG/1
1 TABLET, FILM COATED ORAL EVERY 12 HOURS
Status: DISCONTINUED | OUTPATIENT
Start: 2017-04-25 | End: 2017-04-25 | Stop reason: HOSPADM

## 2017-04-25 RX ORDER — AMOXICILLIN AND CLAVULANATE POTASSIUM 875; 125 MG/1; MG/1
1 TABLET, FILM COATED ORAL EVERY 12 HOURS
Qty: 13 TAB | Refills: 0 | Status: SHIPPED | OUTPATIENT
Start: 2017-04-25 | End: 2017-05-02

## 2017-04-25 RX ORDER — BACITRACIN ZINC 500 [USP'U]/G
1 OINTMENT TOPICAL 3 TIMES DAILY
COMMUNITY
Start: 2017-04-25 | End: 2017-05-02

## 2017-04-25 RX ORDER — OXYCODONE HYDROCHLORIDE 5 MG/1
2.5 TABLET ORAL
Status: DISCONTINUED | OUTPATIENT
Start: 2017-04-25 | End: 2017-04-25 | Stop reason: HOSPADM

## 2017-04-25 RX ORDER — OXYCODONE HYDROCHLORIDE 5 MG/1
5 TABLET ORAL
Status: DISCONTINUED | OUTPATIENT
Start: 2017-04-25 | End: 2017-04-25 | Stop reason: HOSPADM

## 2017-04-25 RX ORDER — DOXYCYCLINE 100 MG/1
100 TABLET ORAL EVERY 12 HOURS
Status: DISCONTINUED | OUTPATIENT
Start: 2017-04-25 | End: 2017-04-25 | Stop reason: HOSPADM

## 2017-04-25 RX ORDER — DOXYCYCLINE 100 MG/1
100 TABLET ORAL EVERY 12 HOURS
Qty: 13 TAB | Refills: 0 | Status: SHIPPED | OUTPATIENT
Start: 2017-04-25 | End: 2017-05-02

## 2017-04-25 RX ORDER — ACETAMINOPHEN 325 MG/1
1000 TABLET ORAL EVERY 8 HOURS
Status: DISCONTINUED | OUTPATIENT
Start: 2017-04-25 | End: 2017-04-25 | Stop reason: HOSPADM

## 2017-04-25 RX ADMIN — AMPICILLIN SODIUM AND SULBACTAM SODIUM 3 G: 2; 1 INJECTION, POWDER, FOR SOLUTION INTRAMUSCULAR; INTRAVENOUS at 05:37

## 2017-04-25 RX ADMIN — BACITRACIN ZINC: 500 OINTMENT TOPICAL at 10:26

## 2017-04-25 RX ADMIN — POTASSIUM CHLORIDE 20 MEQ: 1500 TABLET, EXTENDED RELEASE ORAL at 10:24

## 2017-04-25 RX ADMIN — STANDARDIZED SENNA CONCENTRATE AND DOCUSATE SODIUM 2 TABLET: 8.6; 5 TABLET, FILM COATED ORAL at 10:23

## 2017-04-25 RX ADMIN — BACITRACIN ZINC: 500 OINTMENT TOPICAL at 15:54

## 2017-04-25 RX ADMIN — OXYCODONE HYDROCHLORIDE 5 MG: 5 TABLET ORAL at 03:28

## 2017-04-25 RX ADMIN — AMPICILLIN SODIUM AND SULBACTAM SODIUM 3 G: 2; 1 INJECTION, POWDER, FOR SOLUTION INTRAMUSCULAR; INTRAVENOUS at 00:07

## 2017-04-25 RX ADMIN — ENOXAPARIN SODIUM 40 MG: 100 INJECTION SUBCUTANEOUS at 10:26

## 2017-04-25 RX ADMIN — LORAZEPAM 0.5 MG: 1 TABLET ORAL at 05:38

## 2017-04-25 RX ADMIN — OXYCODONE HYDROCHLORIDE 5 MG: 5 TABLET ORAL at 10:25

## 2017-04-25 RX ADMIN — VANCOMYCIN HYDROCHLORIDE 1300 MG: 100 INJECTION, POWDER, LYOPHILIZED, FOR SOLUTION INTRAVENOUS at 08:55

## 2017-04-25 RX ADMIN — ACETAMINOPHEN 975 MG: 325 TABLET, FILM COATED ORAL at 15:53

## 2017-04-25 RX ADMIN — VANCOMYCIN HYDROCHLORIDE 1300 MG: 100 INJECTION, POWDER, LYOPHILIZED, FOR SOLUTION INTRAVENOUS at 00:00

## 2017-04-25 ASSESSMENT — PAIN SCALES - GENERAL
PAINLEVEL_OUTOF10: 8
PAINLEVEL_OUTOF10: 10

## 2017-04-25 ASSESSMENT — LIFESTYLE VARIABLES: EVER_SMOKED: YES

## 2017-04-25 NOTE — DISCHARGE SUMMARY
Hospital Medicine Discharge Note     Patient ID:  Kat Devlin  [id number]  31 y.o.female  1985    Admit Date:  4/22/2017       Discharge Date:   4/25/2017    Primary Care Provider: Pcp Pt States None    Admitting Physician: Jorge Medeiros D.O.  Discharging Physician: Akash Osei MD    Chief Complaint: leg pain    Discharge Diagnoses:     Cellulitis- LLE, improved    Hypokalemia- resolved    Chronic Medical Problems:  Past Medical History   Diagnosis Date   • Migraines    • Obesity    • Back pain 6/21/2011   • Headache(784.0) 7/25/2011   • Pain 1/2012     bilat. feet 6/10   • Arthritis      knee and back   • Type II or unspecified type diabetes mellitus without mention of complication, not stated as uncontrolled      diet and oral meds   • Psychiatric disorder      depression and anxiety   • ASTHMA      rarely. uses inhaler exercise inducted   • PCOS (polycystic ovarian syndrome)        Code Status: Full Code    Hospital Summary:       Please refer to H&P by Dr Medeiros on 4/22/2017 for full details.      In brief, Kat Devlin is a 31 y.o. female who was admitted 4/22/2017 for worsening leg pain after being seen at [facility] where she was diagnosed with cellulitis and given oral antibiotics. She was unable to keep these antibiotics down due to nausea and vomiting. She was found to have worsened cellulitis, thus admitted for treatment.    The patient underwent treatment with IV antibiotics, wound care, and close observation. Her cellulitis improved with 3 days of antibiotics. She does have a small area of abscess formation that appears to be improved. The patient has a history of IVDA, and has been given social work services for information for detox and treatment centers. She has been encouraged to stop using drugs.     Today, the patients wound has improved. She is feeling better. Her VS have remained stable, and she has been afebrile. She is feeling well and ready for discharge  home. She has been set up to establish with a new PCP, and is scheduled to see them Friday for a wound check. We have dsicussed she needs to have her wound checked on Friday, either by her PCP or if she cancels that appointment by an ER or UC. We have discussed I recommend she be screened for diabetes, as in 2014 her glycohemoglobin was 7.0. She tells me she is pre-diabetic and this is diet controlled. She declines diabetic education at this time.     Therefore, she is discharged in good and stable condition with close outpatient follow-up.    Consultants:      None    Studies:    Imaging/ Testing:      LE VENOUS DUPLEX - DVT ([facility] Only)   Preliminary Result      FINDINGS:   Left lower extremity -    The mid - distal peroneal and posterior tibial veins are difficult to    assess for compressibility due to patient pain level, but flow response to    augmentation is demonstrated.    All remaining veins complete color filling and compressibility with normal    venous flow dynamics including spontaneous flow, response to augmentation    maneuvers, and respiratory phasicity.      A nonvascularized, mixed echogenicity structure measuring 1.93 x 1.63 cm.           Laboratory:   Recent Labs      04/23/17   0205  04/24/17   0600 04/25/17   0836   WBC  10.2  6.0  6.4   RBC  3.55*  3.38*  3.60*   HEMOGLOBIN  11.3*  10.8*  11.4*   HEMATOCRIT  33.5*  31.9*  33.2*   MCV  95.8  94.4  92.2   MCH  31.5  32.0  31.7   MCHC  32.9*  33.9  34.3   RDW  45.2  44.0  42.1   PLATELETCT  184  186  222   MPV  9.0  8.9*  8.3*       Recent Labs      04/24/17   0600 04/25/17   0731  04/25/17   0836   SODIUM  134*  137  137   POTASSIUM  3.2*  3.6  3.6   CHLORIDE  105  106  106   CO2  23 25  24   GLUCOSE  235*  90  95   BUN  8  6*  6*   CREATININE  0.56  0.53  0.53   CALCIUM  7.6*  8.3*  8.4*       Recent Labs      04/24/17   0600 04/25/17   0731  04/25/17   0836   GLUCOSE  235*  90  95        Results     Procedure Component  "Value Units Date/Time    MRSA BY PCR (AMP) [[id number]] Collected:  04/25/17 1040    Order Status:  Completed Specimen Information:  Respirate from Nares Updated:  04/25/17 1051    Narrative:      Collected By:[id number] MAVIS JACK    COMPLETE O&P [[id number]] Collected:  04/23/17 2140    Order Status:  Completed Specimen Information:  Stool from Stool Updated:  04/24/17 1301     Significant Indicator NEG      Source STL      Site --      Ova And Parasites Pediculus humanus (Body/Head louse)     Narrative:      [id number] JIA SMILEY  Has the patient been out of the country recently?->No  Is the patient immuno-compromised?->No    URINE CULTURE(NEW) [[id number]] Collected:  04/22/17 1900    Order Status:  Completed Specimen Information:  Urine Updated:  04/24/17 0813     Significant Indicator NEG      Source UR      Site --      Urine Culture No growth at 48 hours     Narrative:      Indication for culture:->Emergency Room Patient    BLOOD CULTURE [[id number]] Collected:  04/22/17 0700    Order Status:  Completed Specimen Information:  Blood from Peripheral Updated:  04/23/17 0807     Significant Indicator NEG      Source BLD      Site PERIPHERAL      Blood Culture --      Result:        No Growth    Note: Blood cultures are incubated for 5 days and  are monitored continuously.Positive blood cultures  are called to the RN and reported as soon as  they are identified.      Narrative:      Per Hospital Policy: Only change Specimen Src: to \"Line\" if  specified by physician order.    BLOOD CULTURE [[id number]] Collected:  04/22/17 0656    Order Status:  Completed Specimen Information:  Blood from Peripheral Updated:  04/23/17 0807     Significant Indicator NEG      Source BLD      Site PERIPHERAL      Blood Culture --      Result:        No Growth    Note: Blood cultures are incubated for 5 days and  are monitored continuously.Positive blood cultures  are called to the RN and reported as soon as  they are " "identified.      Narrative:      Per Hospital Policy: Only change Specimen Src: to \"Line\" if  specified by physician order.    URINALYSIS [[id number]]  (Abnormal) Collected:  04/22/17 1900    Order Status:  Completed Specimen Information:  Urine Updated:  04/22/17 1918     Micro Urine Req Microscopic      Color Yellow      Character Clear      Specific Gravity 1.023      Ph 5.5      Glucose Negative mg/dL      Ketones Negative mg/dL      Protein Negative mg/dL      Bilirubin Negative      Nitrite Negative      Leukocyte Esterase Small (A)      Occult Blood Negative     Narrative:      Indication for culture:->Emergency Room Patient    CULTURE WOUND W/ GRAM STAIN [[id number]]     Order Status:  Sent Specimen Information:  Wound from Left Leg           BLOOD CULTURE   Date Value Ref Range Status   04/22/2017   Preliminary    No Growth    Note: Blood cultures are incubated for 5 days and  are monitored continuously.Positive blood cultures  are called to the RN and reported as soon as  they are identified.          Procedures/Surgeries:        None    Disposition: Discharge home    Condition:  Stable    Instructions:   Activity: As tolerated.  Diet: diabetic diet  Followup:   -PCP 1 week  -OP detox and drug abuse program  -Diabetic Education/Counseling Resource, call for appointment:  1. [facility] [phone]   Located at [address], [address]  2. [facility] [phone]   Located at [address]  Instructions:  -Eat a diabetic diet (carb controlled)  -Given instructions to return to the ER if any new or worsening symptoms, worsening condition, or failure to improve  -Call PCP for followup  -No smoking, no alcohol, no caffeine  -Encourage risk factor reduction with tobacco and alcohol abstinence, diet changes, weight loss, and exercise.       Discharge Medications:           Medication List      START taking these medications       Instructions    " acetaminophen 325 MG Tabs   Last time this was given:  650 mg on 4/24/2017  6:22 PM   Commonly known as:  TYLENOL    Take 2 Tabs by mouth every 6 hours as needed.   Dose:  650 mg       amoxicillin-clavulanate 875-125 MG Tabs   Commonly known as:  AUGMENTIN    Take 1 Tab by mouth every 12 hours for 13 doses.   Dose:  1 Tab       bacitracin 500 UNIT/GM Oint   Last time this was given:  4/25/2017 10:26 AM    Apply 1 Each to affected area(s) 3 times a day.   Dose:  1 Each       doxycycline monohydrate 100 MG tablet   Commonly known as:  ADOXA    Take 1 Tab by mouth every 12 hours for 13 doses.   Dose:  100 mg       tramadol 50 MG Tabs   Commonly known as:  ULTRAM    Take 1 Tab by mouth every 6 hours as needed for Moderate Pain.   Dose:  50 mg           Opioid prescription history checked: yes    Total time of the discharge process exceeds 36 minutes. This included face to face with the patient, medication reconciliation, care co ordination with nursing involved in patient care and discussion and co ordination with case management.     Please CC the above physicians    TIMMY Raphael  4/25/2017  11:25 AM

## 2017-04-25 NOTE — DISCHARGE INSTRUCTIONS
Cellulitis  Cellulitis is an infection of the skin and the tissue under the skin. The infected area is usually red and tender. This happens most often in the arms and lower legs.  HOME CARE   · Take your antibiotic medicine as told. Finish the medicine even if you start to feel better.  · Keep the infected arm or leg raised (elevated).  · Put a warm cloth on the area up to 4 times per day.  · Only take medicines as told by your doctor.  · Keep all doctor visits as told.  GET HELP IF:  · You see red streaks on the skin coming from the infected area.  · Your red area gets bigger or turns a dark color.  · Your bone or joint under the infected area is painful after the skin heals.  · Your infection comes back in the same area or different area.  · You have a puffy (swollen) bump in the infected area.  · You have new symptoms.  · You have a fever.  GET HELP RIGHT AWAY IF:   · You feel very sleepy.  · You throw up (vomit) or have watery poop (diarrhea).  · You feel sick and have muscle aches and pains.  MAKE SURE YOU:   · Understand these instructions.  · Will watch your condition.  · Will get help right away if you are not doing well or get worse.     This information is not intended to replace advice given to you by your health care provider. Make sure you discuss any questions you have with your health care provider.     Document Released: 06/05/2009 Document Revised: 01/08/2016 Document Reviewed: 03/04/2013  [employer] Interactive Patient Education ©2016 [employer]  Discharge Instructions    Discharged to home by car with self. Discharged via walking, hospital escort: Refused.  Special equipment needed: Not Applicable    Be sure to schedule a follow-up appointment with your primary care doctor or any specialists as instructed.     Discharge Plan:   Diet Plan: Discussed  Activity Level: Discussed  Smoking Cessation Offered: Patient Refused  Confirmed Follow up Appointment: Patient to Call and Schedule  Appointment  Confirmed Symptoms Management: Discussed  Medication Reconciliation Updated: Yes  Influenza Vaccine Indication: Patient Refuses    I understand that a diet low in cholesterol, fat, and sodium is recommended for good health. Unless I have been given specific instructions below for another diet, I accept this instruction as my diet prescription.   Other diet: consistent carbohydrate    Special Instructions: None    · Is patient discharged on Warfarin / Coumadin?   No     · Is patient Post Blood Transfusion?  No    Depression / Suicide Risk    As you are discharged from this [facility] facility, it is important to learn how to keep safe from harming yourself.    Recognize the warning signs:  · Abrupt changes in personality, positive or negative- including increase in energy   · Giving away possessions  · Change in eating patterns- significant weight changes-  positive or negative  · Change in sleeping patterns- unable to sleep or sleeping all the time   · Unwillingness or inability to communicate  · Depression  · Unusual sadness, discouragement and loneliness  · Talk of wanting to die  · Neglect of personal appearance   · Rebelliousness- reckless behavior  · Withdrawal from people/activities they love  · Confusion- inability to concentrate     If you or a loved one observes any of these behaviors or has concerns about self-harm, here's what you can do:  · Talk about it- your feelings and reasons for harming yourself  · Remove any means that you might use to hurt yourself (examples: pills, rope, extension cords, firearm)  · Get professional help from the community (Mental Health, Substance Abuse, psychological counseling)  · Do not be alone:Call your Safe Contact- someone whom you trust who will be there for you.  · Call your local CRISIS HOTLINE 999-4967 or 278-690-1674  · Call your local Children's Mobile Crisis Response Team [place] (293) 143-9373 or www.[employer]  · Call the toll free  National Suicide Prevention Hotlines   · National Suicide Prevention Lifeline 270-532-AGXG (9706)  · National Herman Line Network 800-SUICIDE (136-5508)    Opioid Withdrawal  Opioids are a group of narcotic drugs. They include the street drug heroin. They also include pain medicines, such as morphine, hydrocodone, oxycodone, and fentanyl. Opioid withdrawal is a group of characteristic physical and mental signs and symptoms. It typically occurs if you have been using opioids daily for several weeks or longer and stop using or rapidly decrease use. Opioid withdrawal can also occur if you have used opioids daily for a long time and are given a medicine to block the effect.   SIGNS AND SYMPTOMS  Opioid withdrawal includes three or more of the following symptoms:   · Depressed, anxious, or irritable mood.  · Nausea or vomiting.  · Muscle aches or spasms.    · Watery eyes.     · Runny nose.  · Dilated pupils, sweating, or hairs standing on end.  · Diarrhea or intestinal cramping.  · Yawning.    · Fever.  · Increased blood pressure.  · Fast pulse.  · Restlessness or trouble sleeping.  These signs and symptoms occur within several hours of stopping or reducing short-acting opioids, such as heroin. They can occur within 3 days of stopping or reducing long-acting opioids, such as methadone. Withdrawal begins within minutes of receiving a drug that blocks the effects of opioids, such as naltrexone or naloxone.  DIAGNOSIS   Opioid use disorder is diagnosed by your health care provider. You will be asked about your symptoms, drug and alcohol use, medical history, and use of medicines. A physical exam may be done. Lab tests may be ordered. Your health care provider may have you see a mental health professional.   TREATMENT   The treatment for opioid withdrawal is usually provided by medical doctors with special training in substance use disorders (addiction specialists). The following medicines may be included in  treatment:  · Opioids given in place of the abused opioid. They turn on opioid receptors in the brain and lessen or prevent withdrawal symptoms. They are gradually decreased (opioid substitution and taper).  · Non-opioids that can lessen certain opioid withdrawal symptoms. They may be used alone or with opioid substitution and taper.  Successful long-term recovery usually requires medicine, counseling, and group support.  HOME CARE INSTRUCTIONS   · Take medicines only as directed by your health care provider.  · Check with your health care provider before starting new medicines.  · Keep all follow-up visits as directed by your health care provider.  SEEK MEDICAL CARE IF:  · You are not able to take your medicines as directed.  · Your symptoms get worse.  · You relapse.  SEEK IMMEDIATE MEDICAL CARE IF:  · You have serious thoughts about hurting yourself or others.  · You have a seizure.  · You lose consciousness.     This information is not intended to replace advice given to you by your health care provider. Make sure you discuss any questions you have with your health care provider.     Document Released: 12/20/2004 Document Revised: 01/08/2016 Document Reviewed: 12/31/2014  [employer] Interactive Patient Education ©2016 [employer]

## 2017-04-25 NOTE — CARE PLAN
"Problem: Communication  Goal: The ability to communicate needs accurately and effectively will improve  Outcome: PROGRESSING SLOWER THAN EXPECTED  Patient agitated, anxious and ignores staff when asked questions. Will continue to try and open lines of communication with patient.     Problem: Pain Management  Goal: Pain level will decrease to patient’s comfort goal  Outcome: PROGRESSING SLOWER THAN EXPECTED  Patient complaining that pain medications are not adequate to control her pain. Patient states, \"I use heroin, this isnt going to cut it.\"         "

## 2017-04-25 NOTE — PROGRESS NOTES
informed RN that patient is refusing blood draw. RN asked for patient to be put on lsit for later in am, will talk to patient about lab draw.

## 2017-04-25 NOTE — PROGRESS NOTES
"Assumed care of patient at 1900. Patient is agitated and anxious. Patient states she will \"leave here if they dont fix pain medications.\" Also states \"I do heroin, what do they think this tiny medication is going to do?\" Patient provided with ice cream. Resting quietly at this time. Medications given per MAR. POC reviewed. Call light within reach. IV keypad locked d/t patient turning it off. Will continue to monitor.   "

## 2017-04-25 NOTE — PROGRESS NOTES
"Pharmacy Kinetics 31 y.o. female on vancomycin day # 3 2017    Currently on Vancomycin 1300 mg iv q8hr (, , 16)    Indication for Treatment:   SSTI     Pertinent history per medical record:   Admitted on 2017 for LLE cellulitis.  Patient has a history of IVDU, had recently been evaluated at [place].  She was prescribed po antibiotics (Bactrim?), but was unable to keep them down. Physical exam significant for 1 cm wound on LLE without purulent drainage. Antibiotics initiated for presumed cellulitis.     Other antibiotics:   Unasyn 3g IV q6h    Allergies:   Review of patient's allergies indicates no known allergies.     List concerns for renal function:   BMI = 33.5, ? DM    Pertinent cultures to date:    PBC x 2: NGTD   Ucx: negative    Recent Labs      17   0354  17   0205  17   0600   WBC  15.1*  10.2  6.0   NEUTSPOLYS  58.90  57.50  46.90   BANDSSTABS  2.70  0.90   --      Recent Labs      17   0354  17   0205  17   0600   BUN  16  9  8   CREATININE  1.03  0.59  0.56     Recent Labs      17   0717   VANCOTROUGH  15.9   No intake or output data in the 24 hours ending 17 1819   Blood pressure 136/83, pulse 84, temperature 37.3 °C (99.2 °F), resp. rate 20, height 1.753 m (5' 9\"), weight 103 kg (227 lb 1.2 oz), SpO2 94 %, not currently breastfeeding. Temp (24hrs), Av.6 °C (99.7 °F), Min:37 °C (98.6 °F), Max:38.7 °C (101.7 °F)      A/P   1. Vancomycin dose change: continue 1300 mg IV q8h (00, 08, 16)  2. Next vancomycin level: 2017 @ 0730 (ordered)   3. Goal trough: 12-16  4. Comments: Erythema and edema of affected area has improved per MD. Pt's WBC count has normalized and she has remained afebrile. No acute events. Renal indices stable. Will continue current regimen.  Fairly low risk for accumulation and toxicity given pt's age, but obtain VT at steady state tomorrow AM to ensure sufficient drug clearance given pt's body habitus. " Pharmacy will continue to monitor and adjust or de-escalate as appropriate.      Peggy Abel, PharmD

## 2017-04-26 NOTE — CONSULTS
Diabetes education: Order received for diabetes education just prior to order for discharge. Attempted to visit patient ( Discharge note states patient had refused to have diabetes education) but she was sleeping and when awakened, complained she had not had much sleep and needed to have sleep.  Discharge education not given.

## 2017-04-29 LAB
MRSA DNA SPEC QL NAA+PROBE: NORMAL
SIGNIFICANT IND 70042: NORMAL
SITE SITE: NORMAL
SOURCE SOURCE: NORMAL

## 2017-05-06 ENCOUNTER — RESOLUTE PROFESSIONAL BILLING HOSPITAL PROF FEE (OUTPATIENT)
Dept: HOSPITALIST | Facility: MEDICAL CENTER | Age: 32
End: 2017-05-06
Payer: MEDICAID

## 2017-05-06 ENCOUNTER — HOSPITAL ENCOUNTER (INPATIENT)
Facility: MEDICAL CENTER | Age: 32
LOS: 2 days | DRG: 603 | End: 2017-05-08
Attending: EMERGENCY MEDICINE | Admitting: HOSPITALIST
Payer: MEDICAID

## 2017-05-06 DIAGNOSIS — L03.116 CELLULITIS OF LEFT LEG: ICD-10-CM

## 2017-05-06 DIAGNOSIS — F11.10 HEROIN ABUSE (HCC): ICD-10-CM

## 2017-05-06 PROBLEM — L03.90 CELLULITIS: Status: ACTIVE | Noted: 2017-05-06

## 2017-05-06 LAB
ALBUMIN SERPL BCP-MCNC: 3.6 G/DL (ref 3.2–4.9)
ALBUMIN/GLOB SERPL: 0.9 G/DL
ALP SERPL-CCNC: 79 U/L (ref 30–99)
ALT SERPL-CCNC: 65 U/L (ref 2–50)
ANION GAP SERPL CALC-SCNC: 10 MMOL/L (ref 0–11.9)
APPEARANCE UR: CLEAR
APPEARANCE UR: CLEAR
AST SERPL-CCNC: 59 U/L (ref 12–45)
BASOPHILS # BLD AUTO: 0.7 % (ref 0–1.8)
BASOPHILS # BLD: 0.04 K/UL (ref 0–0.12)
BILIRUB SERPL-MCNC: 1.4 MG/DL (ref 0.1–1.5)
BUN SERPL-MCNC: 16 MG/DL (ref 8–22)
CALCIUM SERPL-MCNC: 9 MG/DL (ref 8.5–10.5)
CHLORIDE SERPL-SCNC: 105 MMOL/L (ref 96–112)
CO2 SERPL-SCNC: 24 MMOL/L (ref 20–33)
COLOR UR AUTO: YELLOW
COLOR UR AUTO: YELLOW
CREAT SERPL-MCNC: 0.68 MG/DL (ref 0.5–1.4)
CRP SERPL HS-MCNC: 0.88 MG/DL (ref 0–0.75)
EOSINOPHIL # BLD AUTO: 0.05 K/UL (ref 0–0.51)
EOSINOPHIL NFR BLD: 0.9 % (ref 0–6.9)
ERYTHROCYTE [DISTWIDTH] IN BLOOD BY AUTOMATED COUNT: 44.3 FL (ref 35.9–50)
ERYTHROCYTE [SEDIMENTATION RATE] IN BLOOD BY WESTERGREN METHOD: 49 MM/HOUR (ref 0–20)
GFR SERPL CREATININE-BSD FRML MDRD: >60 ML/MIN/1.73 M 2
GLOBULIN SER CALC-MCNC: 4.2 G/DL (ref 1.9–3.5)
GLUCOSE SERPL-MCNC: 141 MG/DL (ref 65–99)
GLUCOSE UR QL STRIP.AUTO: NEGATIVE MG/DL
GLUCOSE UR STRIP.AUTO-MCNC: NORMAL MG/DL
HCG SERPL QL: NEGATIVE
HCT VFR BLD AUTO: 39.1 % (ref 37–47)
HGB BLD-MCNC: 13 G/DL (ref 12–16)
IMM GRANULOCYTES # BLD AUTO: 0.01 K/UL (ref 0–0.11)
IMM GRANULOCYTES NFR BLD AUTO: 0.2 % (ref 0–0.9)
KETONES UR QL STRIP.AUTO: NEGATIVE MG/DL
KETONES UR STRIP.AUTO-MCNC: NORMAL MG/DL
LEUKOCYTE ESTERASE UR QL STRIP.AUTO: ABNORMAL
LEUKOCYTE ESTERASE UR QL STRIP.AUTO: NORMAL
LYMPHOCYTES # BLD AUTO: 2.51 K/UL (ref 1–4.8)
LYMPHOCYTES NFR BLD: 45.9 % (ref 22–41)
MCH RBC QN AUTO: 31.6 PG (ref 27–33)
MCHC RBC AUTO-ENTMCNC: 33.2 G/DL (ref 33.6–35)
MCV RBC AUTO: 94.9 FL (ref 81.4–97.8)
MONOCYTES # BLD AUTO: 0.31 K/UL (ref 0–0.85)
MONOCYTES NFR BLD AUTO: 5.7 % (ref 0–13.4)
NEUTROPHILS # BLD AUTO: 2.55 K/UL (ref 2–7.15)
NEUTROPHILS NFR BLD: 46.6 % (ref 44–72)
NITRITE UR QL STRIP.AUTO: NEGATIVE
NITRITE UR QL STRIP.AUTO: NORMAL
NRBC # BLD AUTO: 0 K/UL
NRBC BLD AUTO-RTO: 0 /100 WBC
PH UR STRIP.AUTO: 7 [PH]
PH UR STRIP.AUTO: 7 [PH] (ref 5–8)
PLATELET # BLD AUTO: 318 K/UL (ref 164–446)
PMV BLD AUTO: 8.3 FL (ref 9–12.9)
POTASSIUM SERPL-SCNC: 4.4 MMOL/L (ref 3.6–5.5)
PROT SERPL-MCNC: 7.8 G/DL (ref 6–8.2)
PROT UR QL STRIP: NEGATIVE MG/DL
PROT UR QL STRIP: NORMAL MG/DL
RBC # BLD AUTO: 4.12 M/UL (ref 4.2–5.4)
RBC UR QL AUTO: NEGATIVE
RBC UR QL AUTO: NORMAL
SODIUM SERPL-SCNC: 139 MMOL/L (ref 135–145)
SP GR UR STRIP.AUTO: 1.02
SP GR UR: 1.02
WBC # BLD AUTO: 5.5 K/UL (ref 4.8–10.8)

## 2017-05-06 PROCEDURE — 85025 COMPLETE CBC W/AUTO DIFF WBC: CPT

## 2017-05-06 PROCEDURE — 96375 TX/PRO/DX INJ NEW DRUG ADDON: CPT

## 2017-05-06 PROCEDURE — 81002 URINALYSIS NONAUTO W/O SCOPE: CPT | Performed by: EMERGENCY MEDICINE

## 2017-05-06 PROCEDURE — 99223 1ST HOSP IP/OBS HIGH 75: CPT | Performed by: HOSPITALIST

## 2017-05-06 PROCEDURE — 81002 URINALYSIS NONAUTO W/O SCOPE: CPT

## 2017-05-06 PROCEDURE — 99285 EMERGENCY DEPT VISIT HI MDM: CPT

## 2017-05-06 PROCEDURE — 96366 THER/PROPH/DIAG IV INF ADDON: CPT

## 2017-05-06 PROCEDURE — 86140 C-REACTIVE PROTEIN: CPT

## 2017-05-06 PROCEDURE — 85652 RBC SED RATE AUTOMATED: CPT

## 2017-05-06 PROCEDURE — 84703 CHORIONIC GONADOTROPIN ASSAY: CPT

## 2017-05-06 PROCEDURE — 96365 THER/PROPH/DIAG IV INF INIT: CPT

## 2017-05-06 PROCEDURE — 770006 HCHG ROOM/CARE - MED/SURG/GYN SEMI*

## 2017-05-06 PROCEDURE — 700102 HCHG RX REV CODE 250 W/ 637 OVERRIDE(OP): Performed by: HOSPITALIST

## 2017-05-06 PROCEDURE — 700111 HCHG RX REV CODE 636 W/ 250 OVERRIDE (IP): Performed by: PHARMACIST

## 2017-05-06 PROCEDURE — A9270 NON-COVERED ITEM OR SERVICE: HCPCS | Performed by: HOSPITALIST

## 2017-05-06 PROCEDURE — 700111 HCHG RX REV CODE 636 W/ 250 OVERRIDE (IP): Performed by: HOSPITALIST

## 2017-05-06 PROCEDURE — 700105 HCHG RX REV CODE 258: Performed by: PHARMACIST

## 2017-05-06 PROCEDURE — 80053 COMPREHEN METABOLIC PANEL: CPT

## 2017-05-06 RX ORDER — ACETAMINOPHEN 325 MG/1
650 TABLET ORAL EVERY 6 HOURS PRN
Status: DISCONTINUED | OUTPATIENT
Start: 2017-05-06 | End: 2017-05-08 | Stop reason: HOSPADM

## 2017-05-06 RX ORDER — DOXYCYCLINE HYCLATE 100 MG
100 TABLET ORAL 2 TIMES DAILY
Status: ON HOLD | COMMUNITY
Start: 2017-04-25 | End: 2017-05-08

## 2017-05-06 RX ORDER — ONDANSETRON 4 MG/1
4 TABLET, ORALLY DISINTEGRATING ORAL EVERY 4 HOURS PRN
Status: DISCONTINUED | OUTPATIENT
Start: 2017-05-06 | End: 2017-05-08 | Stop reason: HOSPADM

## 2017-05-06 RX ORDER — PROMETHAZINE HYDROCHLORIDE 25 MG/1
12.5-25 TABLET ORAL EVERY 4 HOURS PRN
Status: DISCONTINUED | OUTPATIENT
Start: 2017-05-06 | End: 2017-05-08 | Stop reason: HOSPADM

## 2017-05-06 RX ORDER — ONDANSETRON 2 MG/ML
4 INJECTION INTRAMUSCULAR; INTRAVENOUS EVERY 4 HOURS PRN
Status: DISCONTINUED | OUTPATIENT
Start: 2017-05-06 | End: 2017-05-08 | Stop reason: HOSPADM

## 2017-05-06 RX ORDER — OXYCODONE HYDROCHLORIDE 5 MG/1
5 TABLET ORAL EVERY 4 HOURS PRN
Status: DISCONTINUED | OUTPATIENT
Start: 2017-05-06 | End: 2017-05-07

## 2017-05-06 RX ORDER — AMOXICILLIN 250 MG
2 CAPSULE ORAL 2 TIMES DAILY
Status: DISCONTINUED | OUTPATIENT
Start: 2017-05-07 | End: 2017-05-08 | Stop reason: HOSPADM

## 2017-05-06 RX ORDER — DEXTROSE MONOHYDRATE 25 G/50ML
25 INJECTION, SOLUTION INTRAVENOUS
Status: DISCONTINUED | OUTPATIENT
Start: 2017-05-06 | End: 2017-05-08 | Stop reason: HOSPADM

## 2017-05-06 RX ORDER — AMOXICILLIN AND CLAVULANATE POTASSIUM 875; 125 MG/1; MG/1
1 TABLET, FILM COATED ORAL 2 TIMES DAILY
Status: ON HOLD | COMMUNITY
Start: 2017-04-25 | End: 2017-05-08

## 2017-05-06 RX ORDER — BISACODYL 10 MG
10 SUPPOSITORY, RECTAL RECTAL
Status: DISCONTINUED | OUTPATIENT
Start: 2017-05-06 | End: 2017-05-08 | Stop reason: HOSPADM

## 2017-05-06 RX ORDER — POLYETHYLENE GLYCOL 3350 17 G/17G
1 POWDER, FOR SOLUTION ORAL
Status: DISCONTINUED | OUTPATIENT
Start: 2017-05-06 | End: 2017-05-08 | Stop reason: HOSPADM

## 2017-05-06 RX ORDER — PROMETHAZINE HYDROCHLORIDE 25 MG/1
12.5-25 SUPPOSITORY RECTAL EVERY 4 HOURS PRN
Status: DISCONTINUED | OUTPATIENT
Start: 2017-05-06 | End: 2017-05-08 | Stop reason: HOSPADM

## 2017-05-06 RX ADMIN — OXYCODONE HYDROCHLORIDE 5 MG: 5 TABLET ORAL at 23:52

## 2017-05-06 RX ADMIN — ONDANSETRON 4 MG: 2 INJECTION INTRAMUSCULAR; INTRAVENOUS at 23:52

## 2017-05-06 RX ADMIN — VANCOMYCIN HYDROCHLORIDE 2600 MG: 100 INJECTION, POWDER, LYOPHILIZED, FOR SOLUTION INTRAVENOUS at 22:54

## 2017-05-06 ASSESSMENT — PAIN SCALES - GENERAL: PAINLEVEL_OUTOF10: 10

## 2017-05-06 NOTE — ED NOTES
Pt here with left leg swelling. Pt recently admitted for cellulitis. Pt was only able to take 3 days of abx after d/c. Pt in NAD. vSS

## 2017-05-06 NOTE — IP AVS SNAPSHOT
" <p align=\"LEFT\"><IMG SRC=\"[email or web address]\" alt=\"Image\" WIDTH=\"50%\" HEIGHT=\"200\" BORDER=\"\"></p>                   Name:Kat Devlin  Medical Record Number:[id number]  CSN: [id number]    YOB: 1985   Age: 31 y.o.  Sex: female  HT:1.753 m (5' 9\") WT: 102.6 kg (226 lb 3.1 oz)          Admit Date: 5/6/2017     Discharge Date:   Today's Date: 5/8/2017  Attending Doctor:  Jorge Killian M.D.                  Allergies:  Review of patient's allergies indicates no known allergies.             Medication List      Take these Medications        Instructions    lactobacillus rhamnosus (GG) 10 B CELL Caps capsule    Take 1 Cap by mouth every day.   Dose:  1 Cap       metformin 1000 MG tablet   Commonly known as:  GLUCOPHAGE    Take 1 Tab by mouth 2 times a day, with meals.   Dose:  1000 mg       sulfamethoxazole-trimethoprim 800-160 MG tablet   Commonly known as:  BACTRIM DS    Take 1 Tab by mouth 2 times a day for 7 days.   Dose:  1 Tab         "

## 2017-05-06 NOTE — IP AVS SNAPSHOT
5/8/2017    Kat Devlin  [address] [place] [address]    Dear Kat:    [facility] wants to ensure your discharge home is safe and you or your loved ones have had all of your questions answered regarding your care after you leave the hospital.    Below is a list of resources and contact information should you have any questions regarding your hospital stay, follow-up instructions, or active medical symptoms.    Questions or Concerns Regarding… Contact   Medical Questions Related to Your Discharge  (7 days a week, 8am-5pm) Contact a Nurse Care Coordinator   [phone]   Medical Questions Not Related to Your Discharge  (24 hours a day / 7 days a week)  Contact the Nurse Health Line   [phone]    Medications or Discharge Instructions Refer to your discharge packet   or contact your [facility] Primary Care Provider   [phone]   Follow-up Appointment(s) Schedule your appointment via [employer]   or contact Scheduling [phone]   Billing Review your statement via [employer]  or contact Billing [phone]   Medical Records Review your records via [employer]   or contact Medical Records [phone]     You may receive a telephone call within two days of discharge. This call is to make certain you understand your discharge instructions and have the opportunity to have any questions answered. You can also easily access your medical information, test results and upcoming appointments via the [employer] free online health management tool. You can learn more and sign up at REach/[employer]. For assistance setting up your [employer] account, please call [phone].    Once again, we want to ensure your discharge home is safe and that you have a clear understanding of any next steps in your care. If you have any questions or concerns, please do not hesitate to contact us, we are here for you. Thank you for choosing [facility] for your healthcare needs.    Sincerely,    Your [facility] Healthcare Team

## 2017-05-06 NOTE — IP AVS SNAPSHOT
" Home Care Instructions                                                                                                                  Name:Kat Devlin  Medical Record Number:[id number]  CSN: [id number]    YOB: 1985   Age: 31 y.o.  Sex: female  HT:1.753 m (5' 9\") WT: 102.6 kg (226 lb 3.1 oz)          Admit Date: 5/6/2017     Discharge Date:   Today's Date: 5/8/2017  Attending Doctor:  Jorge Killian M.D.                  Allergies:  Review of patient's allergies indicates no known allergies.            Discharge Instructions       Discharge Instructions    Discharged to home by car with friend. Discharged via walking, hospital escort: Refused.  Special equipment needed: Not Applicable    Be sure to schedule a follow-up appointment with your primary care doctor or any specialists as instructed.     Discharge Plan:   Diet Plan: Discussed (Diabetic)  Activity Level: Discussed  Smoking Cessation Offered: Patient Refused  Confirmed Follow up Appointment: Patient to Call and Schedule Appointment  Confirmed Symptoms Management: Discussed  Medication Reconciliation Updated: Yes  Influenza Vaccine Indication: Indicated: Not available from distributor/  Influenza Vaccine Given - only chart on this line when given: Influenza Vaccine Given (See MAR)    I understand that a diet low in cholesterol, fat, and sodium is recommended for good health. Unless I have been given specific instructions below for another diet, I accept this instruction as my diet prescription.   Other diet: Diabetic    Special Instructions: None    · Is patient discharged on Warfarin / Coumadin?   No     · Is patient Post Blood Transfusion?  No    Depression / Suicide Risk    As you are discharged from this [facility] facility, it is important to learn how to keep safe from harming yourself.    Recognize the warning signs:  · Abrupt changes in personality, positive or negative- including increase in energy   · Giving away " possessions  · Change in eating patterns- significant weight changes-  positive or negative  · Change in sleeping patterns- unable to sleep or sleeping all the time   · Unwillingness or inability to communicate  · Depression  · Unusual sadness, discouragement and loneliness  · Talk of wanting to die  · Neglect of personal appearance   · Rebelliousness- reckless behavior  · Withdrawal from people/activities they love  · Confusion- inability to concentrate     If you or a loved one observes any of these behaviors or has concerns about self-harm, here's what you can do:  · Talk about it- your feelings and reasons for harming yourself  · Remove any means that you might use to hurt yourself (examples: pills, rope, extension cords, firearm)  · Get professional help from the community (Mental Health, Substance Abuse, psychological counseling)  · Do not be alone:Call your Safe Contact- someone whom you trust who will be there for you.  · Call your local CRISIS HOTLINE 848-9840 or 637-490-6708  · Call your local Children's Mobile Crisis Response Team [place] (772) 371-0738 or www[employer].com  · Call the toll free National Suicide Prevention Hotlines   · National Suicide Prevention Lifeline 312-446-NHNJ (2739)  · National Hope Line Network 800-SUICIDE (894-8048)           Discharge Medication Instructions:    Below are the medications your physician expects you to take upon discharge:    Review all your home medications and newly ordered medications with your doctor and/or pharmacist. Follow medication instructions as directed by your doctor and/or pharmacist.    Please keep your medication list with you and share with your physician.               Medication List      START taking these medications        Instructions    Morning Afternoon Evening Bedtime    lactobacillus rhamnosus (GG) 10 B CELL Caps capsule        Take 1 Cap by mouth every day.   Dose:  1 Cap                        metformin 1000 MG tablet   Last  time this was given:  1,000 mg on 5/8/2017  8:57 AM   Commonly known as:  GLUCOPHAGE        Take 1 Tab by mouth 2 times a day, with meals.   Dose:  1000 mg                        sulfamethoxazole-trimethoprim 800-160 MG tablet   Commonly known as:  BACTRIM DS        Take 1 Tab by mouth 2 times a day for 7 days.   Dose:  1 Tab                          STOP taking these medications     amoxicillin-clavulanate 875-125 MG Tabs   Commonly known as:  AUGMENTIN               doxycycline 100 MG Tabs   Commonly known as:  VIBRAMYCIN                    Where to Get Your Medications      Information about where to get these medications is not yet available     ! Ask your nurse or doctor about these medications    - lactobacillus rhamnosus (GG) 10 B CELL Caps capsule  - metformin 1000 MG tablet  - sulfamethoxazole-trimethoprim 800-160 MG tablet            Instructions           Diet / Nutrition:    Follow any diet instructions given to you by your doctor or the dietician, including how much salt (sodium) you are allowed each day.    If you are overweight, talk to your doctor about a weight reduction plan.    Activity:    Remain physically active following your doctor's instructions about exercise and activity.    Rest often.     Any time you become even a little tired or short of breath, SIT DOWN and rest.    Worsening Symptoms:    Report any of the following signs and symptoms to the doctor's office immediately:    *Pain of jaw, arm, or neck  *Chest pain not relieved by medication                               *Dizziness or loss of consciousness  *Difficulty breathing even when at rest   *More tired than usual                                       *Bleeding drainage or swelling of surgical site  *Swelling of feet, ankles, legs or stomach                 *Fever (>100ºF)  *Pink or blood tinged sputum  *Weight gain (3lbs/day or 5lbs /week)           *Shock from internal defibrillator (if applicable)  *Palpitations or irregular  heartbeats                *Cool and/or numb extremities    Stroke Awareness    Common Risk Factors for Stroke include:    Age  Atrial Fibrillation  Carotid Artery Stenosis  Diabetes Mellitus  Excessive alcohol consumption  High blood pressure  Overweight   Physical inactivity  Smoking    Warning signs and symptoms of a stroke include:    *Sudden numbness or weakness of the face, arm or leg (especially on one side of the body).  *Sudden confusion, trouble speaking or understanding.  *Sudden trouble seeing in one or both eyes.  *Sudden trouble walking, dizziness, loss of balance or coordination.Sudden severe headache with no known cause.    It is very important to get treatment quickly when a stroke occurs. If you experience any of the above warning signs, call 911 immediately.                   Disclaimer         Quit Smoking / Tobacco Use:    I understand the use of any tobacco products increases my chance of suffering from future heart disease or stroke and could cause other illnesses which may shorten my life. Quitting the use of tobacco products is the single most important thing I can do to improve my health. For further information on smoking / tobacco cessation call a Toll Free Quit Line at 1-903.847.5414 (*National Cancer [place]) or 1-765.562.1376 (American Lung Association) or you can access the web based program at www.lungusa.org.    [place] Tobacco Users Help Line:  (328) 421-6658       Toll Free: 1-698.693.4405  Quit Tobacco Program [facility] (377)272-7222    Crisis Hotline:    [place] Crisis Hotline:  8-271-MMKDINX or 1-806.955.3483    [place] Crisis Hotline:    1-351.902.3644 or 720-091-5386    Discharge Survey:   Thank you for choosing [facility]. We hope we did everything we could to make your hospital stay a pleasant one. You may be receiving a phone survey and we would appreciate your time and participation in answering the questions. Your input is very valuable to us  in our efforts to improve our service to our patients and their families.        My signature on this form indicates that:    1. I have reviewed and understand the above information.  2. My questions regarding this information have been answered to my satisfaction.  3. I have formulated a plan with my discharge nurse to obtain my prescribed medications for home.                  Disclaimer         __________________________________                     __________       ________                       Patient Signature                                                 Date                    Time

## 2017-05-06 NOTE — IP AVS SNAPSHOT
Home-Account Access Code: [email or web address]  Expires: 5/25/2017  1:05 PM    Your email address is not on file at [facility].  Email Addresses are required for you to sign up for Home-Account, please contact [phone] to verify your personal information and to provide your email address prior to attempting to register for Home-Account.    Kat Devlin  [address] [place] [address]    Home-Account  A secure, online tool to manage your health information     [facility]’s Home-Account® is a secure, online tool that connects you to your personalized health information from the privacy of your home -- day or night - making it very easy for you to manage your healthcare. Once the activation process is completed, you can even access your medical information using the Home-Account gisella, which is available for free in the Apple Gisella store or Google Play store.     To learn more about Home-Account, visit [email or web address]    There are two levels of access available (as shown below):   My Chart Features  [facility] Primary Care Doctor [facility]  Specialists [facility]  Urgent  Care Non-[facility] Primary Care Doctor   Email your healthcare team securely and privately 24/7 X X X    Manage appointments: schedule your next appointment; view details of past/upcoming appointments X      Request prescription refills. X      View recent personal medical records, including lab and immunizations X X X X   View health record, including health history, allergies, medications X X X X   Read reports about your outpatient visits, procedures, consult and ER notes X X X X   See your discharge summary, which is a recap of your hospital and/or ER visit that includes your diagnosis, lab results, and care plan X X  X     How to register for [facility]:  Once your e-mail address has been verified, follow the following steps to sign up for Home-Account.     1. Go to  [email or web address]  2. Click on the Sign Up Now box, which takes you to the New Member Sign Up page. You will need  to provide the following information:  a. Enter your [facility] Access Code exactly as it appears at the top of this page. (You will not need to use this code after you’ve completed the sign-up process. If you do not sign up before the expiration date, you must request a new code.)   b. Enter your date of birth.   c. Enter your home email address.   d. Click Submit, and follow the next screen’s instructions.  3. Create a [employer] ID. This will be your [employer] login ID and cannot be changed, so think of one that is secure and easy to remember.  4. Create a [employer] password. You can change your password at any time.  5. Enter your Password Reset Question and Answer. This can be used at a later time if you forget your password.   6. Enter your e-mail address. This allows you to receive e-mail notifications when new information is available in [employer].  7. Click Sign Up. You can now view your health information.    For assistance activating your [employer] account, call [phone]

## 2017-05-07 LAB
ANION GAP SERPL CALC-SCNC: 10 MMOL/L (ref 0–11.9)
BASOPHILS # BLD AUTO: 0.9 % (ref 0–1.8)
BASOPHILS # BLD: 0.11 K/UL (ref 0–0.12)
BUN SERPL-MCNC: 12 MG/DL (ref 8–22)
CALCIUM SERPL-MCNC: 9.9 MG/DL (ref 8.5–10.5)
CHLORIDE SERPL-SCNC: 107 MMOL/L (ref 96–112)
CO2 SERPL-SCNC: 22 MMOL/L (ref 20–33)
CREAT SERPL-MCNC: 0.69 MG/DL (ref 0.5–1.4)
EOSINOPHIL # BLD AUTO: 0.1 K/UL (ref 0–0.51)
EOSINOPHIL NFR BLD: 0.8 % (ref 0–6.9)
ERYTHROCYTE [DISTWIDTH] IN BLOOD BY AUTOMATED COUNT: 44.3 FL (ref 35.9–50)
EST. AVERAGE GLUCOSE BLD GHB EST-MCNC: 123 MG/DL
GFR SERPL CREATININE-BSD FRML MDRD: >60 ML/MIN/1.73 M 2
GLUCOSE BLD-MCNC: 117 MG/DL (ref 65–99)
GLUCOSE BLD-MCNC: 145 MG/DL (ref 65–99)
GLUCOSE BLD-MCNC: 56 MG/DL (ref 65–99)
GLUCOSE BLD-MCNC: 62 MG/DL (ref 65–99)
GLUCOSE BLD-MCNC: 74 MG/DL (ref 65–99)
GLUCOSE BLD-MCNC: 83 MG/DL (ref 65–99)
GLUCOSE SERPL-MCNC: 56 MG/DL (ref 65–99)
HBA1C MFR BLD: 5.9 % (ref 0–5.6)
HCT VFR BLD AUTO: 41.5 % (ref 37–47)
HGB BLD-MCNC: 14 G/DL (ref 12–16)
LYMPHOCYTES # BLD AUTO: 7.58 K/UL (ref 1–4.8)
LYMPHOCYTES NFR BLD: 63.2 % (ref 22–41)
MANUAL DIFF BLD: NORMAL
MCH RBC QN AUTO: 32.3 PG (ref 27–33)
MCHC RBC AUTO-ENTMCNC: 33.7 G/DL (ref 33.6–35)
MCV RBC AUTO: 95.6 FL (ref 81.4–97.8)
MONOCYTES # BLD AUTO: 0.82 K/UL (ref 0–0.85)
MONOCYTES NFR BLD AUTO: 6.8 % (ref 0–13.4)
MORPHOLOGY BLD-IMP: NORMAL
MYELOCYTES NFR BLD MANUAL: 0.9 %
NEUTROPHILS # BLD AUTO: 3.29 K/UL (ref 2–7.15)
NEUTROPHILS NFR BLD: 26.5 % (ref 44–72)
NEUTS BAND NFR BLD MANUAL: 0.9 % (ref 0–10)
NRBC # BLD AUTO: 0 K/UL
NRBC BLD AUTO-RTO: 0 /100 WBC
PLATELET # BLD AUTO: 293 K/UL (ref 164–446)
PLATELET BLD QL SMEAR: NORMAL
PMV BLD AUTO: 9.5 FL (ref 9–12.9)
POTASSIUM SERPL-SCNC: 3.2 MMOL/L (ref 3.6–5.5)
RBC # BLD AUTO: 4.34 M/UL (ref 4.2–5.4)
RBC BLD AUTO: NORMAL
SODIUM SERPL-SCNC: 139 MMOL/L (ref 135–145)
WBC # BLD AUTO: 12 K/UL (ref 4.8–10.8)

## 2017-05-07 PROCEDURE — 700102 HCHG RX REV CODE 250 W/ 637 OVERRIDE(OP): Performed by: HOSPITALIST

## 2017-05-07 PROCEDURE — 700105 HCHG RX REV CODE 258

## 2017-05-07 PROCEDURE — 82962 GLUCOSE BLOOD TEST: CPT

## 2017-05-07 PROCEDURE — A9270 NON-COVERED ITEM OR SERVICE: HCPCS | Performed by: INTERNAL MEDICINE

## 2017-05-07 PROCEDURE — 83036 HEMOGLOBIN GLYCOSYLATED A1C: CPT

## 2017-05-07 PROCEDURE — 80048 BASIC METABOLIC PNL TOTAL CA: CPT

## 2017-05-07 PROCEDURE — A9270 NON-COVERED ITEM OR SERVICE: HCPCS | Performed by: HOSPITALIST

## 2017-05-07 PROCEDURE — A9270 NON-COVERED ITEM OR SERVICE: HCPCS | Performed by: NURSE PRACTITIONER

## 2017-05-07 PROCEDURE — 770006 HCHG ROOM/CARE - MED/SURG/GYN SEMI*

## 2017-05-07 PROCEDURE — 99233 SBSQ HOSP IP/OBS HIGH 50: CPT | Performed by: INTERNAL MEDICINE

## 2017-05-07 PROCEDURE — 85007 BL SMEAR W/DIFF WBC COUNT: CPT

## 2017-05-07 PROCEDURE — 85027 COMPLETE CBC AUTOMATED: CPT

## 2017-05-07 PROCEDURE — 36415 COLL VENOUS BLD VENIPUNCTURE: CPT

## 2017-05-07 PROCEDURE — 700111 HCHG RX REV CODE 636 W/ 250 OVERRIDE (IP)

## 2017-05-07 PROCEDURE — 700102 HCHG RX REV CODE 250 W/ 637 OVERRIDE(OP): Performed by: NURSE PRACTITIONER

## 2017-05-07 PROCEDURE — 700102 HCHG RX REV CODE 250 W/ 637 OVERRIDE(OP): Performed by: INTERNAL MEDICINE

## 2017-05-07 RX ORDER — LORAZEPAM 1 MG/1
1 TABLET ORAL EVERY 4 HOURS PRN
Status: DISCONTINUED | OUTPATIENT
Start: 2017-05-07 | End: 2017-05-08 | Stop reason: HOSPADM

## 2017-05-07 RX ORDER — OXYCODONE HYDROCHLORIDE 10 MG/1
10 TABLET ORAL EVERY 4 HOURS PRN
Status: DISCONTINUED | OUTPATIENT
Start: 2017-05-07 | End: 2017-05-08 | Stop reason: HOSPADM

## 2017-05-07 RX ORDER — NICOTINE 21 MG/24HR
21 PATCH, TRANSDERMAL 24 HOURS TRANSDERMAL
Status: DISCONTINUED | OUTPATIENT
Start: 2017-05-07 | End: 2017-05-08 | Stop reason: HOSPADM

## 2017-05-07 RX ORDER — GLYBURIDE 5 MG/1
5 TABLET ORAL 2 TIMES DAILY WITH MEALS
Status: DISCONTINUED | OUTPATIENT
Start: 2017-05-07 | End: 2017-05-08 | Stop reason: HOSPADM

## 2017-05-07 RX ORDER — LORAZEPAM 0.5 MG/1
0.5 TABLET ORAL EVERY 4 HOURS PRN
Status: DISCONTINUED | OUTPATIENT
Start: 2017-05-07 | End: 2017-05-07

## 2017-05-07 RX ORDER — LORAZEPAM 0.5 MG/1
0.5 TABLET ORAL EVERY 4 HOURS PRN
Status: DISCONTINUED | OUTPATIENT
Start: 2017-05-07 | End: 2017-05-08 | Stop reason: HOSPADM

## 2017-05-07 RX ORDER — LORAZEPAM 0.5 MG/1
.5-1 TABLET ORAL EVERY 4 HOURS PRN
Status: DISCONTINUED | OUTPATIENT
Start: 2017-05-07 | End: 2017-05-07

## 2017-05-07 RX ORDER — OXYCODONE HYDROCHLORIDE 5 MG/1
5-10 TABLET ORAL EVERY 4 HOURS PRN
Status: DISCONTINUED | OUTPATIENT
Start: 2017-05-07 | End: 2017-05-07

## 2017-05-07 RX ORDER — OXYCODONE HYDROCHLORIDE 5 MG/1
5 TABLET ORAL EVERY 4 HOURS PRN
Status: DISCONTINUED | OUTPATIENT
Start: 2017-05-07 | End: 2017-05-08 | Stop reason: HOSPADM

## 2017-05-07 RX ADMIN — OXYCODONE HYDROCHLORIDE 5 MG: 5 TABLET ORAL at 11:14

## 2017-05-07 RX ADMIN — LORAZEPAM 1 MG: 1 TABLET ORAL at 15:35

## 2017-05-07 RX ADMIN — OXYCODONE HYDROCHLORIDE 5 MG: 5 TABLET ORAL at 21:17

## 2017-05-07 RX ADMIN — LORAZEPAM 0.5 MG: 0.5 TABLET ORAL at 02:16

## 2017-05-07 RX ADMIN — LORAZEPAM 0.5 MG: 0.5 TABLET ORAL at 06:07

## 2017-05-07 RX ADMIN — LORAZEPAM 0.5 MG: 0.5 TABLET ORAL at 11:14

## 2017-05-07 RX ADMIN — VANCOMYCIN HYDROCHLORIDE 1100 MG: 100 INJECTION, POWDER, LYOPHILIZED, FOR SOLUTION INTRAVENOUS at 21:06

## 2017-05-07 RX ADMIN — OXYCODONE HYDROCHLORIDE 5 MG: 5 TABLET ORAL at 06:07

## 2017-05-07 RX ADMIN — METFORMIN HYDROCHLORIDE 1000 MG: 500 TABLET, FILM COATED ORAL at 11:09

## 2017-05-07 RX ADMIN — NICOTINE 21 MG: 21 PATCH, EXTENDED RELEASE TRANSDERMAL at 06:07

## 2017-05-07 RX ADMIN — OXYCODONE HYDROCHLORIDE 10 MG: 10 TABLET ORAL at 14:55

## 2017-05-07 RX ADMIN — LORAZEPAM 0.5 MG: 0.5 TABLET ORAL at 21:17

## 2017-05-07 RX ADMIN — VANCOMYCIN HYDROCHLORIDE 1100 MG: 100 INJECTION, POWDER, LYOPHILIZED, FOR SOLUTION INTRAVENOUS at 06:06

## 2017-05-07 RX ADMIN — VANCOMYCIN HYDROCHLORIDE 1100 MG: 100 INJECTION, POWDER, LYOPHILIZED, FOR SOLUTION INTRAVENOUS at 13:13

## 2017-05-07 RX ADMIN — GLYBURIDE 5 MG: 5 TABLET ORAL at 11:09

## 2017-05-07 RX ADMIN — STANDARDIZED SENNA CONCENTRATE AND DOCUSATE SODIUM 2 TABLET: 8.6; 5 TABLET, FILM COATED ORAL at 21:05

## 2017-05-07 ASSESSMENT — ENCOUNTER SYMPTOMS
PSYCHIATRIC NEGATIVE: 1
BRUISES/BLEEDS EASILY: 0
COUGH: 0
DEPRESSION: 0
CARDIOVASCULAR NEGATIVE: 1
RESPIRATORY NEGATIVE: 1
EYES NEGATIVE: 1
GASTROINTESTINAL NEGATIVE: 1
FEVER: 0
BLURRED VISION: 0
WEAKNESS: 1
DIZZINESS: 0
HEARTBURN: 0
NECK PAIN: 0
MYALGIAS: 1
HEADACHES: 0

## 2017-05-07 ASSESSMENT — LIFESTYLE VARIABLES
ALCOHOL_USE: NO
EVER_SMOKED: YES

## 2017-05-07 ASSESSMENT — PAIN SCALES - GENERAL
PAINLEVEL_OUTOF10: 3
PAINLEVEL_OUTOF10: 10

## 2017-05-07 NOTE — ED PROVIDER NOTES
ED Provider Note    Scribed for Fred Castellano M.D. by Maggi Alvarado. 2017, 6:36 PM.    Primary care provider: Pcp Pt States None  Means of arrival: Walk-in  History obtained from: Patient  History limited by: None    CHIEF COMPLAINT  Chief Complaint   Patient presents with   • Leg Swelling       HPI  Kat Devlin is a 31 y.o. female who presents to the Emergency Department for evaluation of leg swelling onset a few weeks ago. Per patient, she was in the emergency department 1.5 weeks ago for similar symptoms. She states that she was admitted and prescribed antibiotics which she left in her friend's car and was unable to take after taking them for three days. The patient reports that her swelling went down today but was very severe yesterday. She states that she is unable to move her ankle. Per patient, she has a history of diabetes and has recently lost weight due to lifestyle changes. The patient currently uses IV Heroin.    REVIEW OF SYSTEMS  See HPI for further details. All other systems are negative.     PAST MEDICAL HISTORY   has a past medical history of Migraines; Obesity; Back pain (2011); Headache (2011); Pain (2012); Arthritis; Type II or unspecified type diabetes mellitus without mention of complication, not stated as uncontrolled; Psychiatric disorder; ASTHMA; and PCOS (polycystic ovarian syndrome).    SURGICAL HISTORY   has past surgical history that includes  delivery only; repeat c section (2012); other; other (2014); tubal coagulation laparoscopic bilateral (2015); dilation and curettage (2015); and hysteroscopy novasure-2 (2015).    SOCIAL HISTORY  Social History   Substance Use Topics   • Smoking status: Current Every Day Smoker -- 0.50 packs/day for 18 years     Types: Cigarettes   • Smokeless tobacco: Never Used   • Alcohol Use: No      History   Drug Use   • Yes   • Special: Intravenous     Comment: Meth       FAMILY HISTORY  Family  "History   Problem Relation Age of Onset   • Non-contributory Mother    • Hyperlipidemia Mother    • Non-contributory Father    • Alcohol/Drug Father    • Arthritis Maternal Grandmother    • Hyperlipidemia Maternal Grandmother    • Heart Disease Maternal Grandfather        CURRENT MEDICATIONS  Reviewed.  See Encounter Summary.     ALLERGIES  No Known Allergies    PHYSICAL EXAM  VITAL SIGNS: /84 mmHg  Pulse 108  Temp(Src) 37.2 °C (99 °F) (Temporal)  Resp 18  Ht 1.753 m (5' 9\")  Wt 102.6 kg (226 lb 3.1 oz)  BMI 33.39 kg/m2  SpO2 97%  Constitutional: Alert in no apparent distress.  HENT: No signs of trauma, Bilateral external ears normal, Nose normal.   Eyes: Pupils are equal and reactive, Conjunctiva normal, Non-icteric.   Neck: Normal range of motion, No tenderness, Supple, No stridor.   Lymphatic: No lymphadenopathy noted.   Cardiovascular: Regular rate and rhythm, no murmurs.   Thorax & Lungs: Normal breath sounds, No respiratory distress, No wheezing, No chest tenderness.   Abdomen: Bowel sounds normal, Soft, No tenderness, No masses, No pulsatile masses. No peritoneal signs.  Skin: Warm, Dry, No erythema, No rash. multiple dermal lesions to all extremities, multiple track keisha to bilateral upper extremities  Back: No bony tenderness, No CVA tenderness.   Extremities: Intact distal pulses, No tenderness, No cyanosis, slight increased calf diameter on left compared to right, diffuse circumferential erythema to calf, increased warmth to touch  Musculoskeletal: Good range of motion in all major joints. No tenderness to palpation or major deformities noted.   Neurologic: Alert , Normal motor function, Normal sensory function, No focal deficits noted.   Psychiatric: Affect normal, Judgment normal, Mood normal.       DIAGNOSTIC STUDIES / PROCEDURES     LABS  Results for orders placed or performed during the hospital encounter of 05/06/17   HCG Qual Serum   Result Value Ref Range    Beta-Hcg Qualitative Serum " Negative Negative   CBC WITH DIFFERENTIAL   Result Value Ref Range    WBC 5.5 4.8 - 10.8 K/uL    RBC 4.12 (L) 4.20 - 5.40 M/uL    Hemoglobin 13.0 12.0 - 16.0 g/dL    Hematocrit 39.1 37.0 - 47.0 %    MCV 94.9 81.4 - 97.8 fL    MCH 31.6 27.0 - 33.0 pg    MCHC 33.2 (L) 33.6 - 35.0 g/dL    RDW 44.3 35.9 - 50.0 fL    Platelet Count 318 164 - 446 K/uL    MPV 8.3 (L) 9.0 - 12.9 fL    Neutrophils-Polys 46.60 44.00 - 72.00 %    Lymphocytes 45.90 (H) 22.00 - 41.00 %    Monocytes 5.70 0.00 - 13.40 %    Eosinophils 0.90 0.00 - 6.90 %    Basophils 0.70 0.00 - 1.80 %    Immature Granulocytes 0.20 0.00 - 0.90 %    Nucleated RBC 0.00 /100 WBC    Neutrophils (Absolute) 2.55 2.00 - 7.15 K/uL    Lymphs (Absolute) 2.51 1.00 - 4.80 K/uL    Monos (Absolute) 0.31 0.00 - 0.85 K/uL    Eos (Absolute) 0.05 0.00 - 0.51 K/uL    Baso (Absolute) 0.04 0.00 - 0.12 K/uL    Immature Granulocytes (abs) 0.01 0.00 - 0.11 K/uL    NRBC (Absolute) 0.00 K/uL   COMP METABOLIC PANEL   Result Value Ref Range    Sodium 139 135 - 145 mmol/L    Potassium 4.4 3.6 - 5.5 mmol/L    Chloride 105 96 - 112 mmol/L    Co2 24 20 - 33 mmol/L    Anion Gap 10.0 0.0 - 11.9    Glucose 141 (H) 65 - 99 mg/dL    Bun 16 8 - 22 mg/dL    Creatinine 0.68 0.50 - 1.40 mg/dL    Calcium 9.0 8.5 - 10.5 mg/dL    AST(SGOT) 59 (H) 12 - 45 U/L    ALT(SGPT) 65 (H) 2 - 50 U/L    Alkaline Phosphatase 79 30 - 99 U/L    Total Bilirubin 1.4 0.1 - 1.5 mg/dL    Albumin 3.6 3.2 - 4.9 g/dL    Total Protein 7.8 6.0 - 8.2 g/dL    Globulin 4.2 (H) 1.9 - 3.5 g/dL    A-G Ratio 0.9 g/dL   CRP QUANTITIVE (NON-CARDIAC)   Result Value Ref Range    Stat C-Reactive Protein 0.88 (H) 0.00 - 0.75 mg/dL   WESTERGREN SED RATE   Result Value Ref Range    Sed Rate Westergren 49 (H) 0 - 20 mm/hour   ESTIMATED GFR   Result Value Ref Range    GFR If African American >60 >60 mL/min/1.73 m 2    GFR If Non African American >60 >60 mL/min/1.73 m 2   POC URINALYSIS   Result Value Ref Range    POC Color yellow Negative    POC  Appearance clear Negative    POC Glucose neg Negative mg/dL    POC Ketones neg Negative mg/dL    POC Specific Gravity 1.020 <1.005 - >1.030    POC Blood neg Negative    POC Urine PH 7.0 5.0 - 8.0    POC Protein neg Negative mg/dL    POC Nitrites neg Negative    POC Leukocyte Esterase trace Negative   POC UA   Result Value Ref Range    POC Color Yellow     POC Appearance Clear     POC Glucose Negative Negative mg/dL    POC Ketones Negative Negative mg/dL    POC Specific Gravity 1.020 1.005-1.030    POC Blood Negative Negative    POC Urine PH 7.0 5.0-8.0    POC Protein Negative Negative mg/dL    POC Nitrites Negative Negative    POC Leukocyte Esterase Trace (A) Negative     All labs were reviewed by me.    COURSE & MEDICAL DECISION MAKING  Pertinent Labs & Imaging studies reviewed. (See chart for details)    6:36 PM - Patient seen and examined at bedside. Patient will be treated with 2,600 mg in  ml IV Vancomycin. Ordered Estimated GFR, Urine drug screen, HCG Qual serum, CBC with differential, CMP, POC urinalysis, CRP Quantitative, Westergren sed rate to evaluate her symptoms.     22:13 PM Paged Hospitalist.    22:16 PM Spoke with Dr. Coley, Hospitalist, about the patient's condition. He agreed to admit the patient.    Decision Making:  This is a 31 y.o. year old female who presents with persistent and recurrent worsening left lower leg cellulitis. Patient was in the hospital for similar although has been noncompliant with outpatient oral antibiotics. She continues to use heroin. At this time given the fact that the patient is homeless and has failed outpatient care I do believe she will require ongoing inpatient IV antibiotics for further stabilization. The case has been discussed with the hospital service which is also agreeable to this plan.    DISPOSITION:  Patient will be admitted to Dr. Coley in guarded condition.    FINAL IMPRESSION  1. Cellulitis of left leg    2. Heroin abuse          Maggi HARRIS  Jenny (Scribe), am scribing for, and in the presence of, Fred Castellano M.D..    Electronically signed by: Maggi Alvarado (Shahramibmiah), 5/6/2017    IFred M.D. personally performed the services described in this documentation, as scribed by Maggi Alvarado in my presence, and it is both accurate and complete.    The note accurately reflects work and decisions made by me.  Fred Castellano  5/7/2017  2:28 AM

## 2017-05-07 NOTE — PROGRESS NOTES
"Pharmacy Kinetics 31 y.o. female on vancomycin day # 2 2017    Currently on Vancomycin 1100 mg iv q8hr    Indication for Treatment: SSTI - LLE cellulitis    Pertinent history per medical record: Admitted on 2017 for LLE cellulitis. Patient with history of IVDU; was recently admitted to [facility] for LLE cellulitis and discharged on oral Augmentin & doxycycline. Patient reports she was unable to complete her course of outpatient antibiotics and returned to ED with continued redness and swelling of her LLE. Empiric vancomycin initiated for treatment of her SSTI.     Other antibiotics: none    Allergies: Review of patient's allergies indicates no known allergies.     List concerns for renal function : DM, abnormal LFTs,  & obesity (BMI ~33).    Pertinent cultures to date:   None this admission yet    Recent Labs      17   1321   WBC  5.5  12.0*   NEUTSPOLYS  46.60  26.50*   BANDSSTABS   --   0.90     Recent Labs      17   1321   BUN  16  12   CREATININE  0.68  0.69   ALBUMIN  3.6   --      No results for input(s): VANCOTROUGH, VANCOPEAK, VANCORANDOM in the last 72 hours.  Intake/Output Summary (Last 24 hours) at 17 1528  Last data filed at 17 1313   Gross per 24 hour   Intake    250 ml   Output      0 ml   Net    250 ml      Blood pressure 114/76, pulse 91, temperature 36.5 °C (97.7 °F), temperature source Temporal, resp. rate 20, height 1.753 m (5' 9\"), weight 102.6 kg (226 lb 3.1 oz), SpO2 98 %. Temp (24hrs), Av.8 °C (98.3 °F), Min:36.5 °C (97.7 °F), Max:37.2 °C (98.9 °F)      A/P   1. Vancomycin dose change: continue current dosing  2. Next vancomycin level: 1330 17  3. Goal trough: 12-16 mcg/mL  4. Comments: Will order a trough level at steady state, concern for accumulation due to body habitus. Renal indices have improved. She has tolerated q8h vanco on previous admission. Will follow up and change dosing if indicated.     Kirssy Shailesh,PharmD    "

## 2017-05-07 NOTE — H&P
DATE OF ADMISSION:  05/06/2017    PRIMARY CARE PROVIDER:  None.    CHIEF COMPLAINT:  Left leg pain.    HISTORY OF PRESENT ILLNESS:  A 31-year-old female.  She has a history of   substance abuse.  She skin pops both methamphetamine and heroin.  For the past   month, the patient has had some difficulty with cellulitis at her left leg   and she was initially seen at [place].  She was prescribed oral   antibiotics.  She did have trouble taking these, they cause some nausea.  She   ended up being admitted to [facility] shortly thereafter.  From 04/22/2017 to   04/25/2017, she was treated at [facility] initially with IV Unasyn and vancomycin.    She has significant improvement in her cellulitis and she was discharged   home with Augmentin and doxycycline.  The patient tells me that she took these   for 3 days, the medications were then stolen or lost and she has been off   them since that time she has had gradual recurrent cellulitis of her left leg   and has become progressively more swollen, it is painful to the touch.  She   endorses malaise, no fevers or chills.  She has been using subcutaneous heroin   to treat the pain.  She endorses mild cough.  No shortness of breath.  No   headache.  No numbness or tingling in her extremities.  No nausea, vomiting.    No diarrhea or constipation.    REVIEW OF SYSTEMS:  A comprehensive review of systems was performed.  All   pertinent positives and negatives are described in the HPI, all other systems   reviewed and are negative.    PAST MEDICAL HISTORY:  1.  Cellulitis.  2.  Hyperglycemia, probable diabetes.    SOCIAL HISTORY:  The patient actively smokes cigarettes.  She injects both   heroin and methamphetamines.  She has slept outside for the past 2 years.    FAMILY HISTORY:  Multiple family members with diabetes.    ALLERGIES:  No known drug allergies.    MEDICATIONS:  None.    PHYSICAL EXAMINATION:  VITAL SIGNS:  Temperature 37.2, blood pressure 132/84, pulse 108, respirations    18, saturating 97% on room air.  GENERAL:  The patient is well developed, well nourished.  She is in no   apparent distress.  HEENT:  Pupils are equally round and reactive.  Extraocular movements are   intact.  Anicteric sclerae.  NECK:  Supple.  No lymphadenopathy.  No thyromegaly.  CARDIOVASCULAR:  Tachycardic.  No murmurs, rubs or gallops.  PMI is   nondisplaced.  RESPIRATORY:  Clear to auscultation bilaterally.  No wheezing, no crackles, no   respiratory distress.  ABDOMEN:  Soft, nontender, nondistended.  No rebound or guarding.  EXTREMITIES:  No clubbing, no cyanosis.  There is a circumferential area of   erythema at her left calf.  Centrally, there is a small eschar, which is   nonfluctuant.  The area is tender to palpation.  Left calf appears swollen   compared to the right and marked the area of erythema with a black marker this   evening.  NEUROLOGICAL:  Cranial nerves II through XII were intact.  A 5/5 strength in   upper and lower extremities bilaterally.    LABORATORY DATA:  White blood count 5.5, hemoglobin 13, platelets 318.  Sodium   139, potassium 4.4, BUN 16, creatinine 0.68, glucose 141.    ASSESSMENT AND PLAN:  A 31-year-old female with probable untreated diabetes,   presents with cellulitis that failed outpatient treatment due to lost   medications.  1.  Cellulitis.  The patient certainly has risk factors for   methicillin-resistant Staphylococcus aureus with her injection drug use.  She   has significant cellulitis that is unlikely improved and she is discharged   from the emergency room, can start on vancomycin, monitor clinical progress.    We could probably transfer or change her over to p.o. antibiotics.  Oxycodone   prescribed for acute pain during her hospitalization.  I did tell her that we   would likely be unable to provide this medication on discharge.  2.  Hyperglycemia.  The patient almost certainly has diabetes based on her   blood sugars that have been as high as 290 during her  hospitalization.  She   needs outpatient followup for this.  3.  Prophylaxis.  No need for prophylaxis.  The patient is ambulatory.  4.  Full code.    Case discussed with the emergency room physician, Dr. Fred Castellano.  I expect   the patient to be admitted in the hospital for greater than 2 midnights for   treatment of an extensive cellulitis and diabetes, which requires IV   antibiotics.       ____________________________________     MD REBEKAH PIERRE / AKIRA    DD:  05/06/2017 23:07:06  DT:  05/07/2017 01:20:57    D#:  [id number]  Job#:  [id number]

## 2017-05-07 NOTE — PROGRESS NOTES
Assumed care of pt at 0130  Pt transferred in wheelchair  Pt very anxious. Pt stated she is currently going through withdrawals for heroin.   PIV patent with IV abx infusing  Assessment complete  Oriented to room and call light  Snacks brought to bedside.  All needs met at this time  Admin profile complete  Med rec reviewed.  Allergies reviewed  Pt is homeless and stated that if the withdrawals get too bad, she will leave AMA.   Will continue hourly rounding.

## 2017-05-07 NOTE — PROGRESS NOTES
Skin assessment completed by 2 nurses with Eugenie POSADA  Pt has multiple sores all over body from skin popping.  LLE cellulitis present. Redness marked in ED.

## 2017-05-07 NOTE — PROGRESS NOTES
Pt stating she does not well and feels anxious. Pt asking for blood sugar to be checked. Blood sugar was 62. Gram crackers and orange juice given.     Pt also stating that Oxy 5mg is not helping with her severe pain and 0.5mg of Ativan is not helping her anxiety. Spoke with Hospitalist RN. Per MD and Hospitalist, ok to increase Ativan 0.5-1mg and Oxy 5-10mg Q 4 hours PRN.

## 2017-05-07 NOTE — ED NOTES
Med rec completed per pt at bedside  Allergies reviewed - NKDA  Pt states she was only able to take about 3 days worth of  The doxycyline and Augmentin she was discharged with on  4/25/17. Pt denies taking any other medications since then

## 2017-05-07 NOTE — ED NOTES
Pt ambulates to , has skin sores covering entire body.  Last used heroin and meth yesterday.  States her antibx were stolen and she hasn't taken an antibx in over a week.  Left lower leg red and swollen.  Chart up for ERP

## 2017-05-07 NOTE — PROGRESS NOTES
Paged hospitalist on call.  Pt very anxious. Restless in bed.   Pt requesting medication for anxiety.   Waiting on call back

## 2017-05-07 NOTE — PROGRESS NOTES
Hospital Medicine Progress Note, Adult, Complex               Author: Jorge Killian Date & Time created: 2017  10:44 AM     Interval History:  Discussed the dangers of her IVDA/skin popping, she is aware.  Tolerating abx.  Cellulitis is improving. Poor sugar control.    Review of Systems:  Review of Systems   Constitutional: Negative for fever.   HENT: Negative.    Eyes: Negative.  Negative for blurred vision.   Respiratory: Negative.  Negative for cough.    Cardiovascular: Negative.  Negative for chest pain.   Gastrointestinal: Negative.  Negative for heartburn.   Genitourinary: Negative.  Negative for dysuria.   Musculoskeletal: Positive for myalgias. Negative for neck pain.   Skin: Positive for itching and rash.   Neurological: Positive for weakness. Negative for dizziness and headaches.   Endo/Heme/Allergies: Negative.  Does not bruise/bleed easily.   Psychiatric/Behavioral: Negative.  Negative for depression.       Physical Exam:  Physical Exam  A&Ox3 tired obese  PERRL EOMI mmm  Supple no jvd bruit or braulio  RRR no mrg  CTAB PARAMJIT  Soft abd ntnd bs+  No edema  L lower calf cellulitis  Numerous pock sarmiento over arms/legs    Labs:        Invalid input(s): [id number]      Recent Labs      17   SODIUM  139   POTASSIUM  4.4   CHLORIDE  105   CO2  24   BUN  16   CREATININE  0.68   CALCIUM  9.0     Recent Labs      17   ALTSGPT  65*   ASTSGOT  59*   ALKPHOSPHAT  79   TBILIRUBIN  1.4   GLUCOSE  141*     Recent Labs      17   RBC  4.12*   HEMOGLOBIN  13.0   HEMATOCRIT  39.1   PLATELETCT  318     Recent Labs      17   WBC  5.5   NEUTSPOLYS  46.60   LYMPHOCYTES  45.90*   MONOCYTES  5.70   EOSINOPHILS  0.90   BASOPHILS  0.70   ASTSGOT  59*   ALTSGPT  65*   ALKPHOSPHAT  79   TBILIRUBIN  1.4           Hemodynamics:  Temp (24hrs), Av.9 °C (98.5 °F), Min:36.5 °C (97.7 °F), Max:37.2 °C (99 °F)  Temperature: 36.5 °C (97.7 °F)  Pulse  Av  Min: 81  Max: 114   Blood  Pressure: 114/76 mmHg, NIBP: 121/91 mmHg     Respiratory:    Respiration: 20, Pulse Oximetry: 98 %           Fluids:  No intake or output data in the 24 hours ending 05/07/17 1044  Weight: 102.6 kg (226 lb 3.1 oz)  GI/Nutrition:  Orders Placed This Encounter   Procedures   • Diet Order     Standing Status: Standing      Number of Occurrences: 1      Standing Expiration Date:      Order Specific Question:  Diet:     Answer:  Diabetic [3]     Medical Decision Making, by Problem:  Active Hospital Problems    Diagnosis   • Cellulitis [L03.90] - vanco, improving   • Methamphetamine abuse [F15.10] - lectured   • Type 2 diabetes mellitus with hyperglycemia (CMS-HCC) [E11.65] - start metformin, glyburide, ISS       EKG reviewed, Medications reviewed, Labs reviewed and Radiology images reviewed  Birch catheter: No Birch      DVT Prophylaxis: Not indicated at this time, ambulatory      Antibiotics: Treating active infection/contamination beyond 24 hours perioperative coverage

## 2017-05-07 NOTE — CARE PLAN
Problem: Safety  Goal: Will remain free from falls  Outcome: PROGRESSING AS EXPECTED  Intervention: Assess risk factors for falls    05/07/17 0111   OTHER   Fall Risk High Risk to Fall - 2 or more points    Risk for Injury-Any positive answers results in the pt being at high risk for fall related injury Not Applicable   Mobility Status Assessment 0-Ambulates & Transfers Independently. No Assistance Required   History of fall 0   Pt Calls for Assistance Yes       Intervention: Implement fall precautions    05/07/17 0111   OTHER   Environmental Precautions Treaded Slipper Socks on Patient;Personal Belongings, Wastebasket, Call Bell etc. in Easy Reach;Transferred to Stronger Side;Report Given to Other Health Care Providers Regarding Fall Risk;Bed in Low Position;Communication Sign for Patients & Families;Mobility Assessed & Appropriate Sign Placed   IV Pole on Same Side of Bed as Bathroom Yes   Bedrails Bedrails Closest to Bathroom Down   Chair/Bed Strip Alarm Patient Educated Regarding Fall Risk and Need for Bed Alarm, Understands and Continues to Refuse           Problem: Infection  Goal: Will remain free from infection  Outcome: PROGRESSING AS EXPECTED

## 2017-05-07 NOTE — PROGRESS NOTES
"Pharmacy Kinetics 31 y.o. female on vancomycin day # 1  2017    Vancomycin New Start   : Vanco load 2600 mg IV at 2254    Indication for Treatment: SSTI - LLE cellulitis    Pertinent history per medical record: Admitted on 2017 for LLE cellulitis. Patient with history of IVDU; was recently admitted to [facility] for LLE cellulitis and discharged on oral Augmentin & doxycycline. Patient reports she was unable to complete her course of outpatient antibiotics and returned to ED with continued redness and swelling of her LLE. Empiric vancomycin initiated for treatment of her SSTI.     Other antibiotics: None    Allergies: Review of patient's allergies indicates no known allergies.     Concerns for renal function include DM, abnormal LFTs, BUN/SCr ratio > 20:1 & obesity (BMI ~33).    Pertinent cultures to date:   None    Recent Labs      17   1940   WBC  5.5   NEUTSPOLYS  46.60     Recent Labs      17   1940   BUN  16   CREATININE  0.68   ALBUMIN  3.6     No results for input(s): VANCOTROUGH, VANCOPEAK, VANCORANDOM in the last 72 hours.    No intake or output data in the 24 hours ending 17 2305     Blood pressure 132/84, pulse 114, temperature 37.2 °C (99 °F), temperature source Temporal, resp. rate 18, height 1.753 m (5' 9\"), weight 102.6 kg (226 lb 3.1 oz), SpO2 98 %. Temp (24hrs), Av.2 °C (99 °F), Min:37.2 °C (99 °F), Max:37.2 °C (99 °F)      A/P   1. Vancomycin dose change: Start vanco 1100 mg IV q8hrs  2. Next vancomycin level: 2 days (not currently ordered)  3. Goal trough: 12-16 mcg/mL  4. Comments: Empiric vancomycin initiated for treatment of SSTI in patient with normal renal function. Primary concern for accumulation of vanco related to body habitus, reviewed patient's recent dosing history and she achieved therapeutic trough levels on q8hr regimen. Patient's previous trough was at higher end of therapeutic range, will start slightly lower scheduled vanco regimen per protocol and plan " trough at steady state to evaluate and adjust as necessary.    Pharmacy will continue to follow.     Paul BlantonD

## 2017-05-07 NOTE — CARE PLAN
Problem: Infection  Goal: Will remain free from infection  Outcome: PROGRESSING AS EXPECTED  IV abx infusing per MAR, decreased swelling and redness to LLE     Problem: Pain Management  Goal: Pain level will decrease to patient’s comfort goal  Outcome: PROGRESSING AS EXPECTED  Medicating for pain to LLE as needed per MAR, ice pack provided     Problem: Psychosocial Needs:  Goal: Level of anxiety will decrease  Outcome: PROGRESSING AS EXPECTED  Instructing pt to deep breathe, encouraging relaxation techniques, medicating for anxiety as needed per MAR

## 2017-05-07 NOTE — PROGRESS NOTES
"Assumed care of pt. A/Ox4, plan of care discussed. Medicating for pain to LLE as needed per MAR. Swelling and redness to LLE, healing scabs noted throughout body. RN educated pt on the dangers of Heroine use and asked if pt had a plan to quit, pt stated she \"has no one to live for and does not plan on stopping.\" IV abx infusing per MAR, pt on room air, tolerating diabetic diet, up self with steady gait. Bed in lowest position, treaded socks on, personal belongings and call light within reach, instructed to call for any assistance.    "

## 2017-05-08 VITALS
BODY MASS INDEX: 33.5 KG/M2 | TEMPERATURE: 97.8 F | WEIGHT: 226.19 LBS | RESPIRATION RATE: 16 BRPM | HEIGHT: 69 IN | SYSTOLIC BLOOD PRESSURE: 142 MMHG | HEART RATE: 99 BPM | OXYGEN SATURATION: 98 % | DIASTOLIC BLOOD PRESSURE: 84 MMHG

## 2017-05-08 LAB
GLUCOSE BLD-MCNC: 91 MG/DL (ref 65–99)
GLUCOSE BLD-MCNC: 96 MG/DL (ref 65–99)
GLUCOSE BLD-MCNC: 98 MG/DL (ref 65–99)
VANCOMYCIN TROUGH SERPL-MCNC: 13.4 UG/ML (ref 10–20)

## 2017-05-08 PROCEDURE — 700105 HCHG RX REV CODE 258

## 2017-05-08 PROCEDURE — 80202 ASSAY OF VANCOMYCIN: CPT

## 2017-05-08 PROCEDURE — A9270 NON-COVERED ITEM OR SERVICE: HCPCS | Performed by: NURSE PRACTITIONER

## 2017-05-08 PROCEDURE — 700102 HCHG RX REV CODE 250 W/ 637 OVERRIDE(OP): Performed by: INTERNAL MEDICINE

## 2017-05-08 PROCEDURE — 700102 HCHG RX REV CODE 250 W/ 637 OVERRIDE(OP): Performed by: HOSPITALIST

## 2017-05-08 PROCEDURE — 700102 HCHG RX REV CODE 250 W/ 637 OVERRIDE(OP): Performed by: NURSE PRACTITIONER

## 2017-05-08 PROCEDURE — A9270 NON-COVERED ITEM OR SERVICE: HCPCS | Performed by: HOSPITALIST

## 2017-05-08 PROCEDURE — 82962 GLUCOSE BLOOD TEST: CPT | Mod: 91

## 2017-05-08 PROCEDURE — 99239 HOSP IP/OBS DSCHRG MGMT >30: CPT | Performed by: INTERNAL MEDICINE

## 2017-05-08 PROCEDURE — 36415 COLL VENOUS BLD VENIPUNCTURE: CPT

## 2017-05-08 PROCEDURE — 700111 HCHG RX REV CODE 636 W/ 250 OVERRIDE (IP)

## 2017-05-08 PROCEDURE — A9270 NON-COVERED ITEM OR SERVICE: HCPCS | Performed by: INTERNAL MEDICINE

## 2017-05-08 RX ORDER — SULFAMETHOXAZOLE AND TRIMETHOPRIM 800; 160 MG/1; MG/1
1 TABLET ORAL 2 TIMES DAILY
Qty: 14 TAB | Refills: 0 | Status: SHIPPED | OUTPATIENT
Start: 2017-05-08 | End: 2017-05-15

## 2017-05-08 RX ADMIN — METFORMIN HYDROCHLORIDE 1000 MG: 500 TABLET, FILM COATED ORAL at 08:57

## 2017-05-08 RX ADMIN — LORAZEPAM 1 MG: 1 TABLET ORAL at 09:00

## 2017-05-08 RX ADMIN — NICOTINE 21 MG: 21 PATCH, EXTENDED RELEASE TRANSDERMAL at 05:33

## 2017-05-08 RX ADMIN — OXYCODONE HYDROCHLORIDE 10 MG: 10 TABLET ORAL at 17:35

## 2017-05-08 RX ADMIN — STANDARDIZED SENNA CONCENTRATE AND DOCUSATE SODIUM 2 TABLET: 8.6; 5 TABLET, FILM COATED ORAL at 09:00

## 2017-05-08 RX ADMIN — VANCOMYCIN HYDROCHLORIDE 1100 MG: 100 INJECTION, POWDER, LYOPHILIZED, FOR SOLUTION INTRAVENOUS at 13:12

## 2017-05-08 RX ADMIN — METFORMIN HYDROCHLORIDE 1000 MG: 500 TABLET, FILM COATED ORAL at 17:31

## 2017-05-08 RX ADMIN — LORAZEPAM 1 MG: 1 TABLET ORAL at 13:16

## 2017-05-08 RX ADMIN — VANCOMYCIN HYDROCHLORIDE 1100 MG: 100 INJECTION, POWDER, LYOPHILIZED, FOR SOLUTION INTRAVENOUS at 05:38

## 2017-05-08 RX ADMIN — LORAZEPAM 1 MG: 1 TABLET ORAL at 02:15

## 2017-05-08 RX ADMIN — LORAZEPAM 1 MG: 1 TABLET ORAL at 17:35

## 2017-05-08 RX ADMIN — OXYCODONE HYDROCHLORIDE 10 MG: 10 TABLET ORAL at 09:00

## 2017-05-08 RX ADMIN — OXYCODONE HYDROCHLORIDE 10 MG: 10 TABLET ORAL at 13:16

## 2017-05-08 RX ADMIN — OXYCODONE HYDROCHLORIDE 10 MG: 10 TABLET ORAL at 02:15

## 2017-05-08 ASSESSMENT — PAIN SCALES - GENERAL
PAINLEVEL_OUTOF10: 7
PAINLEVEL_OUTOF10: 3
PAINLEVEL_OUTOF10: 10

## 2017-05-08 ASSESSMENT — LIFESTYLE VARIABLES: EVER_SMOKED: YES

## 2017-05-08 NOTE — CARE PLAN
Problem: Infection  Goal: Will remain free from infection  Outcome: PROGRESSING AS EXPECTED  IV abx scheduled per MAR. Pt to discharge home on oral abx. Educated on importance of finishing entire course     Problem: Discharge Barriers/Planning  Goal: Patient’s continuum of care needs will be met  Outcome: PROGRESSING AS EXPECTED  Pt to discharge home on oral abx. Discharge instructions to be reviewed with pt    Problem: Pain Management  Goal: Pain level will decrease to patient’s comfort goal  Outcome: PROGRESSING AS EXPECTED  Medicating for 10/10 pain to LLE as needed per. Educated to elevate leg

## 2017-05-08 NOTE — PROGRESS NOTES
"Pharmacy Kinetics 31 y.o. female on vancomycin day # 3 2017    Currently on Vancomycin 1100 mg iv q8hr (0600, 1400, 2200)    Indication for Treatment: SSTI - LLE cellulitis    Pertinent history per medical record: Admitted on 2017 for LLE cellulitis. Patient with history of IVDU; was recently admitted to [facility] [place] for LLE cellulitis and discharged on oral Augmentin & doxycycline. Patient reports she was unable to complete her course of outpatient antibiotics and returned to ED with continued redness and swelling of her LLE. Empiric vancomycin initiated for treatment of her SSTI.    Other antibiotics: none    Allergies: Review of patient's allergies indicates no known allergies.     List concerns for renal function: transaminitis, BMI 33    Pertinent cultures to date:   none    Recent Labs      17   1321   WBC  5.5  12.0*   NEUTSPOLYS  46.60  26.50*   BANDSSTABS   --   0.90     Recent Labs      17   1321   BUN  16  12   CREATININE  0.68  0.69   ALBUMIN  3.6   --      Recent Labs      17   1256   VANCOTROUGH  13.4     Intake/Output Summary (Last 24 hours) at 17 1253  Last data filed at 17 1024   Gross per 24 hour   Intake   2330 ml   Output      0 ml   Net   2330 ml      Blood pressure 129/61, pulse 55, temperature 36.4 °C (97.5 °F), temperature source Temporal, resp. rate 16, height 1.753 m (5' 9\"), weight 102.6 kg (226 lb 3.1 oz), SpO2 99 %. Temp (24hrs), Av.8 °C (98.2 °F), Min:36.4 °C (97.5 °F), Max:37.3 °C (99.1 °F)      A/P   1. Vancomycin dose change: not indicated  2. Next vancomycin level: 3-4 days  3. Goal trough: 12-16 mcg/ml  4. Comments: level at goal.  Renal function stable.  Continue current dosing.  Will recheck a level in a few days if therapy still ongoing.  Pharmacy will continue to monitor and adjust if needed.      Haylee Menjivar, PharmD                "

## 2017-05-08 NOTE — PROGRESS NOTES
Assumed care of pt at shift change, discussed POC. Pt A&Ox4. Denies pain at the moment. IV in place. Pt pleasant and cooperative. Refused bed alarm despite fall risk education, up-self, treaded socks on, call light within reach, bed in low position.

## 2017-05-08 NOTE — DISCHARGE INSTRUCTIONS
Discharge Instructions    Discharged to home by car with friend. Discharged via walking, hospital escort: Refused.  Special equipment needed: Not Applicable    Be sure to schedule a follow-up appointment with your primary care doctor or any specialists as instructed.     Discharge Plan:   Diet Plan: Discussed (Diabetic)  Activity Level: Discussed  Smoking Cessation Offered: Patient Refused  Confirmed Follow up Appointment: Patient to Call and Schedule Appointment  Confirmed Symptoms Management: Discussed  Medication Reconciliation Updated: Yes  Influenza Vaccine Indication: Indicated: Not available from distributor/  Influenza Vaccine Given - only chart on this line when given: Influenza Vaccine Given (See MAR)    I understand that a diet low in cholesterol, fat, and sodium is recommended for good health. Unless I have been given specific instructions below for another diet, I accept this instruction as my diet prescription.   Other diet: Diabetic    Special Instructions: None    · Is patient discharged on Warfarin / Coumadin?   No     · Is patient Post Blood Transfusion?  No    Depression / Suicide Risk    As you are discharged from this [facility], it is important to learn how to keep safe from harming yourself.    Recognize the warning signs:  · Abrupt changes in personality, positive or negative- including increase in energy   · Giving away possessions  · Change in eating patterns- significant weight changes-  positive or negative  · Change in sleeping patterns- unable to sleep or sleeping all the time   · Unwillingness or inability to communicate  · Depression  · Unusual sadness, discouragement and loneliness  · Talk of wanting to die  · Neglect of personal appearance   · Rebelliousness- reckless behavior  · Withdrawal from people/activities they love  · Confusion- inability to concentrate     If you or a loved one observes any of these behaviors or has concerns about self-harm, here's  what you can do:  · Talk about it- your feelings and reasons for harming yourself  · Remove any means that you might use to hurt yourself (examples: pills, rope, extension cords, firearm)  · Get professional help from the community (Mental Health, Substance Abuse, psychological counseling)  · Do not be alone:Call your Safe Contact- someone whom you trust who will be there for you.  · Call your local CRISIS HOTLINE 223-8529 or 662-444-5197  · Call your local Children's Mobile Crisis Response Team [place] (652) 623-9761 or www.WebSafety  · Call the toll free National Suicide Prevention Hotlines   · National Suicide Prevention Lifeline 233-292-MZVQ (1069)  · National Hope Line Network 800-SUICIDE (721-8054)

## 2017-05-08 NOTE — CARE PLAN
Problem: Safety  Goal: Will remain free from injury  Outcome: PROGRESSING AS EXPECTED  Per AM RN reported, pt experienced hypoglycemia during day 62.56, 74.   CTMt s/s of hypoglycemia, FS 83. Offered pt snacks: pudding, gram crackers, and juices. Pt on bed, sleeping comfortly    Problem: Infection  Goal: Will remain free from infection  Outcome: PROGRESSING AS EXPECTED  LLE swelling, redness, and pain, IV abx given per MAR    Problem: Pain Management  Goal: Pain level will decrease to patient’s comfort goal  Outcome: PROGRESSING AS EXPECTED  Prn pain med given per MAR, pt resting on bed comfortly

## 2017-05-08 NOTE — PROGRESS NOTES
Assumed care of pt. A/Ox4, plan of care discussed. Medicating for 10/10 pain to LLE and anxiety as needed per MAR. Minimal redness and swelling noted to LLE, healing scabs present throughout body. IV abx scheduled per MAR. Pt on room air, tolerating diabetic diet, up self with steady gait. Bed in lowest position, treaded socks on, personal belongings and call light within reach, instructed to call for any assistance

## 2017-05-08 NOTE — PROGRESS NOTES
Evening blood sugar was 56. Gram crackers and juice given. Re-checked blood sugar 15 minutes later and was 74. Pt states that Glyburide drops her blood sugar very low and she only takes Metformin at home. Will discuss pts regimen with MD and make appropriate adjustments. Evening Glyburide and Metformin held.

## 2017-05-09 NOTE — DISCHARGE SUMMARY
DATE OF ADMISSION:  05/06/2017    DATE OF DISCHARGE:  05/08/2017    FINAL DIAGNOSES:  1.  Left lower extremity cellulitis, resolved.  2.  Skin popping with numerous _____ marks covering her body, sternly informed   risks of this behavior.  The patient is aware  3.  Prior attempts to quit with multiple visits to various rehab facilities.  4.  Diabetes mellitus, now hypoglycemic on oral agents, to be followed in the   outpatient setting.  5.  Obesity.  6.  Depression.    HISTORY OF PRESENT ILLNESS:  The patient is a 31-year-old female with a   history of skin popping, both heroin and amphetamine.  She had been seen for   cellulitis of the left calf at [place] and discharged on oral agents.  She   returned with worsening cellulitis.    HOSPITAL COURSE:  She was treated aggressively with vancomycin.  The   cellulitis has entirely resolved.  Given the elevated sugars, I placed her on   metformin and glyburide.  Blood sugars hit 56 yesterday.  Glyburide will be   discontinued.  She will be discharged on metformin only.  We had a long talk   about the risks of her behavior.  She understands and will seek outpatient   resources.  She notes where to seek help.    DISCHARGE CONDITION:  Guarded given poor lifestyle choices, but medically   stable.    DISCHARGE DIET:  Diabetic.    DISCHARGE ACTIVITY:  Absolutely no recreational drug use, smoking or alcohol.    DISCHARGE MEDICATIONS:  1.  Metformin 1000 mg twice daily.  2.  Bactrim double strength twice daily for 7 days.  3.  Culturelle 1 tablet daily for 30 days to restore bowel amisha.    CONSULTS:  None.    PROCEDURES:  None.    LABORATORY DATA:  Electrolytes within normal limits except some potassium is   low at 3.2.  White count measured 12, otherwise blood counts were within   normal limits.  Hemoglobin A1c measured 5.9.  _____ is negative.    FOLLOWUP:  She will follow up either at [facility] or [facility] to   establish close followup for her drug addiction.  She  will see primary care   provider in the next 1 week.    Discharge time today 35 minutes.       ____________________________________     MD ANTHONY BEY / AKIRA    DD:  05/08/2017 11:24:39  DT:  05/08/2017 23:01:45    D#:  [id number]  Job#:  [id number]

## 2017-05-26 ENCOUNTER — APPOINTMENT (OUTPATIENT)
Dept: RADIOLOGY | Facility: MEDICAL CENTER | Age: 32
DRG: 605 | End: 2017-05-26
Payer: MEDICAID

## 2017-05-26 ENCOUNTER — HOSPITAL ENCOUNTER (INPATIENT)
Facility: MEDICAL CENTER | Age: 32
LOS: 1 days | DRG: 605 | End: 2017-05-28
Attending: EMERGENCY MEDICINE | Admitting: INTERNAL MEDICINE
Payer: MEDICAID

## 2017-05-26 ENCOUNTER — RESOLUTE PROFESSIONAL BILLING HOSPITAL PROF FEE (OUTPATIENT)
Dept: HOSPITALIST | Facility: MEDICAL CENTER | Age: 32
End: 2017-05-26
Payer: MEDICAID

## 2017-05-26 DIAGNOSIS — R50.9 FEVER, UNSPECIFIED FEVER CAUSE: ICD-10-CM

## 2017-05-26 DIAGNOSIS — L03.116 CELLULITIS OF LEFT LOWER EXTREMITY: ICD-10-CM

## 2017-05-26 DIAGNOSIS — L01.00 IMPETIGO: ICD-10-CM

## 2017-05-26 DIAGNOSIS — L73.9 FOLLICULITIS: ICD-10-CM

## 2017-05-26 DIAGNOSIS — N89.8 VAGINAL DISCHARGE: ICD-10-CM

## 2017-05-26 LAB
ALBUMIN SERPL BCP-MCNC: 3.3 G/DL (ref 3.2–4.9)
ALBUMIN/GLOB SERPL: 0.9 G/DL
ALP SERPL-CCNC: 63 U/L (ref 30–99)
ALT SERPL-CCNC: 48 U/L (ref 2–50)
ANION GAP SERPL CALC-SCNC: 8 MMOL/L (ref 0–11.9)
APPEARANCE UR: CLEAR
AST SERPL-CCNC: 35 U/L (ref 12–45)
BACTERIA #/AREA URNS HPF: ABNORMAL /HPF
BACTERIA GENITAL QL WET PREP: NORMAL
BASOPHILS # BLD AUTO: 0.2 % (ref 0–1.8)
BASOPHILS # BLD: 0.02 K/UL (ref 0–0.12)
BILIRUB SERPL-MCNC: 2.3 MG/DL (ref 0.1–1.5)
BILIRUB UR QL STRIP.AUTO: NEGATIVE
BUN SERPL-MCNC: 14 MG/DL (ref 8–22)
C TRACH DNA SPEC QL NAA+PROBE: NEGATIVE
CALCIUM SERPL-MCNC: 8.8 MG/DL (ref 8.5–10.5)
CHLORIDE SERPL-SCNC: 102 MMOL/L (ref 96–112)
CO2 SERPL-SCNC: 24 MMOL/L (ref 20–33)
COLOR UR: ABNORMAL
COMMENT 1642: NORMAL
CREAT SERPL-MCNC: 0.66 MG/DL (ref 0.5–1.4)
EOSINOPHIL # BLD AUTO: 0.02 K/UL (ref 0–0.51)
EOSINOPHIL NFR BLD: 0.2 % (ref 0–6.9)
EPI CELLS #/AREA URNS HPF: ABNORMAL /HPF
ERYTHROCYTE [DISTWIDTH] IN BLOOD BY AUTOMATED COUNT: 40.9 FL (ref 35.9–50)
GFR SERPL CREATININE-BSD FRML MDRD: >60 ML/MIN/1.73 M 2
GLOBULIN SER CALC-MCNC: 3.8 G/DL (ref 1.9–3.5)
GLUCOSE SERPL-MCNC: 104 MG/DL (ref 65–99)
GLUCOSE UR STRIP.AUTO-MCNC: NEGATIVE MG/DL
GRAM STN SPEC: NORMAL
HCG UR QL: NEGATIVE
HCT VFR BLD AUTO: 33.4 % (ref 37–47)
HGB BLD-MCNC: 11.8 G/DL (ref 12–16)
IMM GRANULOCYTES # BLD AUTO: 0.03 K/UL (ref 0–0.11)
IMM GRANULOCYTES NFR BLD AUTO: 0.4 % (ref 0–0.9)
KETONES UR STRIP.AUTO-MCNC: NEGATIVE MG/DL
LACTATE BLD-SCNC: 1 MMOL/L (ref 0.5–2)
LACTATE BLD-SCNC: 1.7 MMOL/L (ref 0.5–2)
LACTATE BLD-SCNC: 2.2 MMOL/L (ref 0.5–2)
LACTATE BLD-SCNC: 2.3 MMOL/L (ref 0.5–2)
LEUKOCYTE ESTERASE UR QL STRIP.AUTO: ABNORMAL
LYMPHOCYTES # BLD AUTO: 2.21 K/UL (ref 1–4.8)
LYMPHOCYTES NFR BLD: 26.5 % (ref 22–41)
MCH RBC QN AUTO: 31.9 PG (ref 27–33)
MCHC RBC AUTO-ENTMCNC: 35.3 G/DL (ref 33.6–35)
MCV RBC AUTO: 90.3 FL (ref 81.4–97.8)
MICRO URNS: ABNORMAL
MONOCYTES # BLD AUTO: 0.66 K/UL (ref 0–0.85)
MONOCYTES NFR BLD AUTO: 7.9 % (ref 0–13.4)
MORPHOLOGY BLD-IMP: NORMAL
N GONORRHOEA DNA SPEC QL NAA+PROBE: NEGATIVE
NEUTROPHILS # BLD AUTO: 5.39 K/UL (ref 2–7.15)
NEUTROPHILS NFR BLD: 64.8 % (ref 44–72)
NITRITE UR QL STRIP.AUTO: NEGATIVE
NRBC # BLD AUTO: 0 K/UL
NRBC BLD AUTO-RTO: 0 /100 WBC
PH UR STRIP.AUTO: 5.5 [PH]
PLATELET # BLD AUTO: 259 K/UL (ref 164–446)
PLATELET BLD QL SMEAR: NORMAL
PMV BLD AUTO: 8.5 FL (ref 9–12.9)
POTASSIUM SERPL-SCNC: 3.2 MMOL/L (ref 3.6–5.5)
PROT SERPL-MCNC: 7.1 G/DL (ref 6–8.2)
PROT UR QL STRIP: NEGATIVE MG/DL
RBC # BLD AUTO: 3.7 M/UL (ref 4.2–5.4)
RBC # URNS HPF: ABNORMAL /HPF
RBC BLD AUTO: NORMAL
RBC UR QL AUTO: NEGATIVE
SIGNIFICANT IND 70042: NORMAL
SIGNIFICANT IND 70042: NORMAL
SITE SITE: NORMAL
SITE SITE: NORMAL
SODIUM SERPL-SCNC: 134 MMOL/L (ref 135–145)
SOURCE SOURCE: NORMAL
SOURCE SOURCE: NORMAL
SP GR UR REFRACTOMETRY: 1
SP GR UR STRIP.AUTO: 1
SPECIMEN SOURCE: NORMAL
WBC # BLD AUTO: 8.3 K/UL (ref 4.8–10.8)
WBC #/AREA URNS HPF: ABNORMAL /HPF

## 2017-05-26 PROCEDURE — 700102 HCHG RX REV CODE 250 W/ 637 OVERRIDE(OP): Performed by: EMERGENCY MEDICINE

## 2017-05-26 PROCEDURE — A9270 NON-COVERED ITEM OR SERVICE: HCPCS | Performed by: NURSE PRACTITIONER

## 2017-05-26 PROCEDURE — 96375 TX/PRO/DX INJ NEW DRUG ADDON: CPT

## 2017-05-26 PROCEDURE — 96376 TX/PRO/DX INJ SAME DRUG ADON: CPT

## 2017-05-26 PROCEDURE — 87591 N.GONORRHOEAE DNA AMP PROB: CPT

## 2017-05-26 PROCEDURE — A9270 NON-COVERED ITEM OR SERVICE: HCPCS | Performed by: INTERNAL MEDICINE

## 2017-05-26 PROCEDURE — 87086 URINE CULTURE/COLONY COUNT: CPT

## 2017-05-26 PROCEDURE — G0378 HOSPITAL OBSERVATION PER HR: HCPCS

## 2017-05-26 PROCEDURE — 96366 THER/PROPH/DIAG IV INF ADDON: CPT

## 2017-05-26 PROCEDURE — 81001 URINALYSIS AUTO W/SCOPE: CPT

## 2017-05-26 PROCEDURE — 96367 TX/PROPH/DG ADDL SEQ IV INF: CPT

## 2017-05-26 PROCEDURE — 700111 HCHG RX REV CODE 636 W/ 250 OVERRIDE (IP): Performed by: EMERGENCY MEDICINE

## 2017-05-26 PROCEDURE — 87070 CULTURE OTHR SPECIMN AEROBIC: CPT

## 2017-05-26 PROCEDURE — 99220 PR INITIAL OBSERVATION CARE,LEVL III: CPT | Mod: 25 | Performed by: INTERNAL MEDICINE

## 2017-05-26 PROCEDURE — 99407 BEHAV CHNG SMOKING > 10 MIN: CPT | Performed by: INTERNAL MEDICINE

## 2017-05-26 PROCEDURE — 96361 HYDRATE IV INFUSION ADD-ON: CPT

## 2017-05-26 PROCEDURE — 87491 CHLMYD TRACH DNA AMP PROBE: CPT

## 2017-05-26 PROCEDURE — 87205 SMEAR GRAM STAIN: CPT

## 2017-05-26 PROCEDURE — 700111 HCHG RX REV CODE 636 W/ 250 OVERRIDE (IP): Performed by: INTERNAL MEDICINE

## 2017-05-26 PROCEDURE — 81025 URINE PREGNANCY TEST: CPT

## 2017-05-26 PROCEDURE — 700111 HCHG RX REV CODE 636 W/ 250 OVERRIDE (IP)

## 2017-05-26 PROCEDURE — 99285 EMERGENCY DEPT VISIT HI MDM: CPT

## 2017-05-26 PROCEDURE — 85025 COMPLETE CBC W/AUTO DIFF WBC: CPT

## 2017-05-26 PROCEDURE — 80053 COMPREHEN METABOLIC PANEL: CPT

## 2017-05-26 PROCEDURE — 700102 HCHG RX REV CODE 250 W/ 637 OVERRIDE(OP): Performed by: NURSE PRACTITIONER

## 2017-05-26 PROCEDURE — 700105 HCHG RX REV CODE 258

## 2017-05-26 PROCEDURE — 700105 HCHG RX REV CODE 258: Performed by: INTERNAL MEDICINE

## 2017-05-26 PROCEDURE — 87040 BLOOD CULTURE FOR BACTERIA: CPT

## 2017-05-26 PROCEDURE — 83605 ASSAY OF LACTIC ACID: CPT | Mod: 91

## 2017-05-26 PROCEDURE — 700111 HCHG RX REV CODE 636 W/ 250 OVERRIDE (IP): Performed by: NURSE PRACTITIONER

## 2017-05-26 PROCEDURE — A9270 NON-COVERED ITEM OR SERVICE: HCPCS | Performed by: EMERGENCY MEDICINE

## 2017-05-26 PROCEDURE — 96372 THER/PROPH/DIAG INJ SC/IM: CPT

## 2017-05-26 PROCEDURE — 71010 DX-CHEST-PORTABLE (1 VIEW): CPT

## 2017-05-26 PROCEDURE — 700102 HCHG RX REV CODE 250 W/ 637 OVERRIDE(OP): Performed by: INTERNAL MEDICINE

## 2017-05-26 RX ORDER — PROMETHAZINE HYDROCHLORIDE 25 MG/1
12.5-25 SUPPOSITORY RECTAL EVERY 4 HOURS PRN
Status: DISCONTINUED | OUTPATIENT
Start: 2017-05-26 | End: 2017-05-28 | Stop reason: HOSPADM

## 2017-05-26 RX ORDER — CEFTRIAXONE 2 G/1
2 INJECTION, POWDER, FOR SOLUTION INTRAMUSCULAR; INTRAVENOUS ONCE
Status: COMPLETED | OUTPATIENT
Start: 2017-05-26 | End: 2017-05-26

## 2017-05-26 RX ORDER — SODIUM CHLORIDE 9 MG/ML
INJECTION, SOLUTION INTRAVENOUS CONTINUOUS
Status: DISCONTINUED | OUTPATIENT
Start: 2017-05-26 | End: 2017-05-28 | Stop reason: HOSPADM

## 2017-05-26 RX ORDER — LORAZEPAM 2 MG/ML
1 INJECTION INTRAMUSCULAR EVERY 4 HOURS PRN
Status: DISCONTINUED | OUTPATIENT
Start: 2017-05-26 | End: 2017-05-28 | Stop reason: HOSPADM

## 2017-05-26 RX ORDER — TRAMADOL HYDROCHLORIDE 50 MG/1
50 TABLET ORAL EVERY 4 HOURS PRN
Status: DISCONTINUED | OUTPATIENT
Start: 2017-05-26 | End: 2017-05-28 | Stop reason: HOSPADM

## 2017-05-26 RX ORDER — AMOXICILLIN 250 MG
2 CAPSULE ORAL 2 TIMES DAILY
Status: DISCONTINUED | OUTPATIENT
Start: 2017-05-26 | End: 2017-05-28 | Stop reason: HOSPADM

## 2017-05-26 RX ORDER — ONDANSETRON 2 MG/ML
4 INJECTION INTRAMUSCULAR; INTRAVENOUS EVERY 4 HOURS PRN
Status: DISCONTINUED | OUTPATIENT
Start: 2017-05-26 | End: 2017-05-28 | Stop reason: HOSPADM

## 2017-05-26 RX ORDER — ONDANSETRON 4 MG/1
4 TABLET, ORALLY DISINTEGRATING ORAL EVERY 4 HOURS PRN
Status: DISCONTINUED | OUTPATIENT
Start: 2017-05-26 | End: 2017-05-28 | Stop reason: HOSPADM

## 2017-05-26 RX ORDER — METRONIDAZOLE 500 MG/1
500 TABLET ORAL EVERY 12 HOURS
Status: DISCONTINUED | OUTPATIENT
Start: 2017-05-26 | End: 2017-05-28 | Stop reason: HOSPADM

## 2017-05-26 RX ORDER — CYCLOBENZAPRINE HCL 10 MG
5 TABLET ORAL EVERY 6 HOURS PRN
Status: DISCONTINUED | OUTPATIENT
Start: 2017-05-26 | End: 2017-05-28 | Stop reason: HOSPADM

## 2017-05-26 RX ORDER — ACETAMINOPHEN 325 MG/1
650 TABLET ORAL EVERY 6 HOURS PRN
Status: DISCONTINUED | OUTPATIENT
Start: 2017-05-26 | End: 2017-05-28 | Stop reason: HOSPADM

## 2017-05-26 RX ORDER — POTASSIUM CHLORIDE 20 MEQ/1
40 TABLET, EXTENDED RELEASE ORAL ONCE
Status: COMPLETED | OUTPATIENT
Start: 2017-05-26 | End: 2017-05-26

## 2017-05-26 RX ORDER — POLYETHYLENE GLYCOL 3350 17 G/17G
1 POWDER, FOR SOLUTION ORAL
Status: DISCONTINUED | OUTPATIENT
Start: 2017-05-26 | End: 2017-05-28 | Stop reason: HOSPADM

## 2017-05-26 RX ORDER — HEPARIN SODIUM 5000 [USP'U]/ML
5000 INJECTION, SOLUTION INTRAVENOUS; SUBCUTANEOUS EVERY 8 HOURS
Status: DISCONTINUED | OUTPATIENT
Start: 2017-05-26 | End: 2017-05-28 | Stop reason: HOSPADM

## 2017-05-26 RX ORDER — BISACODYL 10 MG
10 SUPPOSITORY, RECTAL RECTAL
Status: DISCONTINUED | OUTPATIENT
Start: 2017-05-26 | End: 2017-05-28 | Stop reason: HOSPADM

## 2017-05-26 RX ORDER — LORAZEPAM 1 MG/1
0.25 TABLET ORAL EVERY 12 HOURS PRN
Status: DISCONTINUED | OUTPATIENT
Start: 2017-05-26 | End: 2017-05-26

## 2017-05-26 RX ORDER — PROMETHAZINE HYDROCHLORIDE 25 MG/1
12.5-25 TABLET ORAL EVERY 4 HOURS PRN
Status: DISCONTINUED | OUTPATIENT
Start: 2017-05-26 | End: 2017-05-28 | Stop reason: HOSPADM

## 2017-05-26 RX ORDER — LORAZEPAM 1 MG/1
1 TABLET ORAL EVERY 4 HOURS PRN
Status: DISCONTINUED | OUTPATIENT
Start: 2017-05-26 | End: 2017-05-28 | Stop reason: HOSPADM

## 2017-05-26 RX ORDER — NICOTINE 21 MG/24HR
21 PATCH, TRANSDERMAL 24 HOURS TRANSDERMAL
Status: DISCONTINUED | OUTPATIENT
Start: 2017-05-26 | End: 2017-05-28 | Stop reason: HOSPADM

## 2017-05-26 RX ORDER — METRONIDAZOLE 500 MG/1
500 TABLET ORAL ONCE
Status: COMPLETED | OUTPATIENT
Start: 2017-05-26 | End: 2017-05-26

## 2017-05-26 RX ADMIN — CEFTRIAXONE 2 G: 2 INJECTION, POWDER, FOR SOLUTION INTRAMUSCULAR; INTRAVENOUS at 08:52

## 2017-05-26 RX ADMIN — CYCLOBENZAPRINE 5 MG: 10 TABLET, FILM COATED ORAL at 21:06

## 2017-05-26 RX ADMIN — LORAZEPAM 0.25 MG: 1 TABLET ORAL at 13:20

## 2017-05-26 RX ADMIN — HEPARIN SODIUM 5000 UNITS: 5000 INJECTION, SOLUTION INTRAVENOUS; SUBCUTANEOUS at 21:06

## 2017-05-26 RX ADMIN — METRONIDAZOLE 500 MG: 500 TABLET ORAL at 08:52

## 2017-05-26 RX ADMIN — SODIUM CHLORIDE: 9 INJECTION, SOLUTION INTRAVENOUS at 10:20

## 2017-05-26 RX ADMIN — ACETAMINOPHEN 650 MG: 325 TABLET, FILM COATED ORAL at 11:40

## 2017-05-26 RX ADMIN — NICOTINE 21 MG: 21 PATCH, EXTENDED RELEASE TRANSDERMAL at 13:19

## 2017-05-26 RX ADMIN — CYCLOBENZAPRINE 5 MG: 10 TABLET, FILM COATED ORAL at 14:48

## 2017-05-26 RX ADMIN — POTASSIUM CHLORIDE 40 MEQ: 1500 TABLET, EXTENDED RELEASE ORAL at 15:30

## 2017-05-26 RX ADMIN — METRONIDAZOLE 500 MG: 500 TABLET ORAL at 19:54

## 2017-05-26 RX ADMIN — VANCOMYCIN HYDROCHLORIDE 2600 MG: 100 INJECTION, POWDER, LYOPHILIZED, FOR SOLUTION INTRAVENOUS at 10:20

## 2017-05-26 RX ADMIN — LORAZEPAM 1 MG: 2 INJECTION INTRAMUSCULAR; INTRAVENOUS at 19:55

## 2017-05-26 RX ADMIN — LORAZEPAM 1 MG: 2 INJECTION INTRAMUSCULAR; INTRAVENOUS at 15:30

## 2017-05-26 RX ADMIN — VANCOMYCIN HYDROCHLORIDE 1100 MG: 100 INJECTION, POWDER, LYOPHILIZED, FOR SOLUTION INTRAVENOUS at 18:16

## 2017-05-26 ASSESSMENT — PAIN SCALES - GENERAL
PAINLEVEL_OUTOF10: 10

## 2017-05-26 ASSESSMENT — LIFESTYLE VARIABLES
EVER_SMOKED: YES
DO YOU DRINK ALCOHOL: NO

## 2017-05-26 NOTE — IP AVS SNAPSHOT
" Home Care Instructions                                                                                                                  Name:Kat Devlin  Medical Record Number:[id number]  CSN: [id number]    YOB: 1985   Age: 31 y.o.  Sex: female  HT:1.753 m (5' 9\") WT: 102 kg (224 lb 13.9 oz)          Admit Date: 5/26/2017     Discharge Date:   Today's Date: 5/28/2017  Attending Doctor:  Giancarlo Hubbard D.O.                  Allergies:  Review of patient's allergies indicates no known allergies.            Discharge Instructions       Discharge Instructions    Discharged to home by car with relative. Discharged via wheelchair, hospital escort: Yes.  Special equipment needed: Not Applicable    Be sure to schedule a follow-up appointment with your primary care doctor or any specialists as instructed.     Discharge Plan:   Diet Plan: Discussed  Activity Level: Discussed  Smoking Cessation Offered: Patient Counseled  Confirmed Follow up Appointment: Appointment Scheduled  Confirmed Symptoms Management: Discussed  Medication Reconciliation Updated: Yes  Influenza Vaccine Indication: Patient Refuses    I understand that a diet low in cholesterol, fat, and sodium is recommended for good health. Unless I have been given specific instructions below for another diet, I accept this instruction as my diet prescription.   Other diet: regular    Special Instructions: None    · Is patient discharged on Warfarin / Coumadin?   No     · Is patient Post Blood Transfusion?  No    Depression / Suicide Risk    As you are discharged from this [facility] facility, it is important to learn how to keep safe from harming yourself.    Recognize the warning signs:  · Abrupt changes in personality, positive or negative- including increase in energy   · Giving away possessions  · Change in eating patterns- significant weight changes-  positive or negative  · Change in sleeping patterns- unable to sleep or sleeping all the " time   · Unwillingness or inability to communicate  · Depression  · Unusual sadness, discouragement and loneliness  · Talk of wanting to die  · Neglect of personal appearance   · Rebelliousness- reckless behavior  · Withdrawal from people/activities they love  · Confusion- inability to concentrate     If you or a loved one observes any of these behaviors or has concerns about self-harm, here's what you can do:  · Talk about it- your feelings and reasons for harming yourself  · Remove any means that you might use to hurt yourself (examples: pills, rope, extension cords, firearm)  · Get professional help from the community (Mental Health, Substance Abuse, psychological counseling)  · Do not be alone:Call your Safe Contact- someone whom you trust who will be there for you.  · Call your local CRISIS HOTLINE 925-4876 or 955-945-6612  · Call your local Children's Mobile Crisis Response Team [place] (266) 913-2411 or www.[employer]  · Call the toll free National Suicide Prevention Hotlines   · National Suicide Prevention Lifeline 211-883-YWZM (6063)  · National Hope Line Network 800-SUICIDE (507-5268)    Opioid Use Disorder  Opioid use disorder is a mental disorder. It is the continued nonmedical use of opioids in spite of risks to health and well-being. Misused opioids include the street drug heroin. They also include pain medicines such as morphine, hydrocodone, oxycodone, and fentanyl. Opioids are very addictive. People who misuse opioids get an exaggerated feeling of well-being. Opioid use disorder often disrupts activities at home, work, or school. It may cause mental or physical problems.   A family history of opioid use disorder puts you at higher risk of it. People with opioid use disorder often misuse other drugs or have mental illness such as depression, posttraumatic stress disorder, or antisocial personality disorder. They also are at risk of suicide and death from overdose.  SIGNS AND SYMPTOMS    Signs and symptoms of opioid use disorder include:  · Use of opioids in larger amounts or over a longer period than intended.  · Unsuccessful attempts to cut down or control opioid use.  · A lot of time spent obtaining, using, or recovering from the effects of opioids.  · A strong desire or urge to use opioids (craving).  · Continued use of opioids in spite of major problems at work, school, or home because of use.  · Continued use of opioids in spite of relationship problems because of use.  · Giving up or cutting down on important life activities because of opioid use.  · Use of opioids over and over in situations when it is physically hazardous, such as driving a car.  · Continued use of opioids in spite of a physical problem that is likely related to use. Physical problems can include:  1. Severe constipation.  2. Poor nutrition.  3. Infertility.  4. Tuberculosis.  5. Aspiration pneumonia.  6. Infections such as human immunodeficiency virus (HIV) and hepatitis (from injecting opioids).  · Continued use of opioids in spite of a mental problem that is likely related to use. Mental problems can include:  1. Depression.  2. Anxiety.  3. Hallucinations.  4. Sleep problems.  5. Loss of sexual function.  · Need to use more and more opioids to get the same effect, or lessened effect over time with use of the same amount (tolerance).  · Having withdrawal symptoms when opioid use is stopped, or using opioids to reduce or avoid withdrawal symptoms. Withdrawal symptoms include:  1. Depressed, anxious, or irritable mood.  2. Nausea, vomiting, diarrhea, or intestinal cramping.  3. Muscle aches or spasms.  4. Excessive tearing or runny nose.  5. Dilated pupils, sweating, or hairs standing on end.  6. Yawning.  7. Fever, raised blood pressure, or fast pulse.  8. Restlessness or trouble sleeping. This does not apply to people taking opioids for medical reasons only.  DIAGNOSIS  Opioid use disorder is diagnosed by your health  care provider. You may be asked questions about your opioid use and and how it affects your life. A physical exam may be done. A drug screen may be ordered. You may be referred to a mental health professional. The diagnosis of opioid use disorder requires at least two symptoms within 12 months. The type of opioid use disorder you have depends on the number of signs and symptoms you have. The type may be:  · Mild. Two or three signs and symptoms.     · Moderate. Four or five signs and symptoms.    · Severe. Six or more signs and symptoms.  TREATMENT   Treatment is usually provided by mental health professionals with training in substance use disorders. The following options are available:  · Detoxification. This is the first step in treatment for withdrawal. It is medically supervised withdrawal with the use of medicines. These medicines lessen withdrawal symptoms. They also raise the chance of becoming opioid free.  · Counseling, also known as talk therapy. Talk therapy addresses the reasons you use opioids. It also addresses ways to keep you from using again (relapse). The goals of talk therapy are to avoid relapse by:  1. Identifying and avoiding triggers for use.  2. Finding healthy ways to cope with stress.  3. Learning how to handle cravings.  · Support groups. Support groups provide emotional support, advice, and guidance.  · A medicine that blocks opioid receptors in your brain. This medicine can reduce opioid cravings that lead to relapse. This medicine also blocks the desired opioid effect when relapse occurs.  · Opioids that are taken by mouth in place of the misused opioid (opioid maintenance treatment). These medicines satisfy cravings but are safer than commonly misused opioids. This often is the best option for people who continue to relapse with other treatments.  HOME CARE INSTRUCTIONS   · Take medicines only as directed by your health care provider.  · Check with your health care provider before  starting new medicines.  · Keep all follow-up visits as directed by your health care provider.  SEEK MEDICAL CARE IF:  · You are not able to take your medicines as directed.  · Your symptoms get worse.  SEEK IMMEDIATE MEDICAL CARE IF:  · You have serious thoughts about hurting yourself or others.  · You may have taken an overdose of opioids.  FOR MORE INFORMATION  · National Orange on Drug Abuse: www.drugabuse.gov  · Substance Abuse and Mental Health Services Administration: www.samhsa.gov     This information is not intended to replace advice given to you by your health care provider. Make sure you discuss any questions you have with your health care provider.     Document Released: 10/14/2008 Document Revised: 01/08/2016 Document Reviewed: 12/31/2014  [employer] Interactive Patient Education ©2016 [employer]  Addiction and the Family  WHAT IS ADDICTION?  Addiction is a complex disease of the brain. It causes an uncontrollable (compulsive) need for a substance. You can be addicted to alcohol or illegal drugs or to prescription medicines, such as painkillers. Addiction can also be a behavior, like gambling or shopping. The need for the drug or activity can become so strong that you think about it all the time. You can also become physically dependent on a substance.   Addiction can change the way your brain works. Because of these changes, getting more of whatever you are addicted to becomes the most important thing to you and feels better than other activities or relationships. Addiction can lead to changes in health, behavior, emotions, relationships, and choices that affect you and everyone around you.  WHAT ARE COMMON SIGNS OF ADDICTION?  Addiction can cause behavioral and emotional signs, as well as physical signs.  Behavioral signs of addiction may include:  · Having an intense craving for whatever you are addicted to.  · Always thinking about your addiction.  · Planning your life around your  addiction.  · Being unable to stop using a substance or participating in a behavior.  · Devoting more time to the addiction. This might mean you no longer go to school or work or spend time with people you enjoy.  · Having an increasing need for money. An addiction might make you ask people for unusual loans or steal items to sell.  · Having exaggerated emotional responses to difficult situations. These may include:  1. Feeling anxious or stressed out.  2. Extreme irritability.  3. Aggression.  4. Lying.  · Continuing with the addiction even after it has caused a bad outcome, such as:  1. Poor health.  2. Damaged relationships.  3. Money loss.  4. Job loss.  5. Getting hurt or arrested.  · Having trouble being realistic about the negative effects of addiction.   Physical signs of addiction may include:  · Bloodshot eyes.  · Nosebleeds.  · Poor hygiene.  · Changing sleep patterns.  · Shakes and tremors.  · Slurred speech.  · Confusion.  · Unconsciousness.  HOW CAN ADDICTION AFFECT FAMILY MEMBERS?  Addiction affects everyone in a family. It touches all aspects of family life, including finances, communication, work, school, and free time.  Children of an addict might have:   · Birth defects, if the mother was addicted during pregnancy.  · Physical, emotional, and behavioral problems.  · Trouble in school.  · Injury from violence, abuse, or accidents.  · Problems because of neglect.  · A greater risk for addiction later in life.  Parents of an addict might experience:   · Confusion.  · Worry.  · Guilt.  · Fear.  · Sadness.  · A desire to make the addiction seem less of a problem than it is.  · Financial strain.  · Feeling isolated from family and friends.  · Physical health changes from stress or from violent confrontations.  Brothers, sisters, and extended family members of an addict might experience:  · Worry.  · Anger.  · Fear.  · Resentment.  · Confusion.  · A desire to hide the addiction and its  impact.  · Financial strain.  HOW DO I KNOW IF TREATMENT FOR ADDICTION IS NEEDED?  Addiction is a progressive disease. Without treatment, addiction can get worse. Living with addiction puts you at higher risk for injury, poor health, lost employment, loss of money, and even death.   You might need treatment for addiction if:  · You have tried to stop or cut down, and you cannot.  · Your addiction is causing physical health problems.  · You find it annoying that your friends and family are concerned about your alcohol or substance use.  · You feel guilty about substance abuse or a behavior.  · You have lied or tried to hide your addiction.  · You need a particular substance to start your day or calm down.  · You are getting in trouble at school, work, home, or with the police.  · You have done something illegal to support your addiction.  · You are running out of money because of your addiction.  · You have no time for anything other than your addiction.  WHAT TYPES OF TREATMENT OPTIONS ARE AVAILABLE?  Treatment for the Addict  The treatment program that is right for you will depend on many factors, including the type of addiction you have. Treatment programs can be outpatient or inpatient. In an outpatient program, you live at home and go to work but also go to a clinic for treatment. With an inpatient program, you sleep and live at the program facility during treatment. After treatment, you might need a plan for support during recovery. Other treatment options include:  · Medicine.  · Some addictions may be treated with prescription medicines.  · You might also need medicine to treat anxiety or depression.  · Counseling and behavior therapy. Therapy can help individuals and families behave and relate more effectively.  · Support groups. Confidential group therapy, such as twelve-step program, can help individuals and families during treatment and recovery.  Treatment for Family Members   Addiction affects the  entire family. Ask your health care provider or counselor to recommend resources for family members. You can call [employer] at 1-775.157.1727 or visit their website at http://www.[employer].org/find-a-group/  WHERE ELSE CAN I GET HELP?  · Ask your health care provider for help finding addiction treatment. These discussions are confidential.  · The National [place] on Alcoholism and Drug Dependence (NCADD). This group has information on treatment centers and programs for people who have an addiction and for family members.  ¨ Their telephone number is 0-713-ZMC-CALL.  ¨ Their website is https://ncAlytics.org/affiliate-network/find-an-affiliate  · The Substance Abuse and Mental Health Services Administration ([facility]). This group will help you find publicly funded treatment centers, help hotlines, and counseling services near you.  ¨ Their telephone number is 6-664-441-HELP (5228).  ¨ Their website is www.findtreatment.[facility].gov     This information is not intended to replace advice given to you by your health care provider. Make sure you discuss any questions you have with your health care provider.     Document Released: 08/23/2005 Document Revised: 01/08/2016 Document Reviewed: 03/09/2015  Elsevier Interactive Patient Education ©2016 [employer]    Drug Abuse, Frequently Asked Questions  Drug addiction is a complex brain disease. It is characterized by compulsive, at times uncontrollable, drug craving, seeking, and use that persists even in the face of extremely negative results. Drug seeking becomes compulsive, in large part as a result of the effects of prolonged drug use on brain functioning and, thus, on behavior. For many people, drug addiction becomes chronic, with relapses possible even after long periods of being off the drug.  HOW QUICKLY CAN I BECOME ADDICTED TO A DRUG?  There is no easy answer to this. If and how quickly you might become addicted to a drug depends on many factors including the biology of your  body. All drugs are potentially harmful and may have life-threatening consequences associated with their use. There are also vast differences among individuals in sensitivity to various drugs. While one person may use a drug many times and suffer no ill effects, another person may be particularly vulnerable and overdose or developing a craving with the first use. There is no way of knowing in advance how someone may react.  HOW DO I KNOW IF SOMEONE IS ADDICTED TO DRUGS?  If a person is compulsively seeking and using a drug despite negative consequences (such as loss of job, debt, physical problems brought on by drug abuse, or family problems) then he or she is probably addicted. Those who screen for drug problems, such as physicians, have developed the CAGE questionnaire. These four simple questions can help detect substance abuse problems:  · Have you ever felt you ought to Cut down on your drinking/drug use?   · Have people ever Annoyed you by criticizing your drinking/drug use?   · Have you ever felt bad or Guilty about your drinking/drug use?   · Have you ever had a drink or taken a drug first thing in the morning to steady your nerves or get rid of a hangover (Eye-opener)?   WHAT ARE THE PHYSICAL SIGNS OF ABUSE OR ADDICTION?  The physical signs of abuse or addiction can vary depending on the person and the drug being abused. For example, someone who abuses marijuana may have a chronic cough or worsening of asthmatic conditions. THC, the chemical in marijuana responsible for producing its effects, is associated with weakening the immune system which makes the user more vulnerable to infections, such as pneumonia. Each drug has short-term and long-term physical effects. Stimulants like cocaine increase heart rate and blood pressure, whereas opioids like heroin may slow the heart rate and reduce breathing (respiration).   ARE THERE EFFECTIVE TREATMENTS FOR DRUG ADDICTION?  Drug addiction can be effectively treated  "with behavioral-based therapies and, for addiction to some drugs such as heroin or nicotine, medications may be used. Treatment may vary for each person depending on the type of drug(s) being used and multiple courses of treatment may be needed to achieve success. Research has revealed 13 basic principles that underlie effective drug addiction treatment. These are discussed in [employer]'s Principles of Drug Addiction Treatment: A Research-Based Guide.  WHERE CAN I FIND INFORMATION ABOUT DRUG TREATMENT PROGRAMS?  · For referrals to treatment programs, visit the Substance Abuse and Mental Health Services Administration online at http://findtreatment.[facility].gov/.   · [employer] publishes an expanding series of treatment manuals, the \"clinical toolbox,\" that gives drug treatment providers research-based information for creating effective treatment programs.   WHAT IS DETOXIFICATION, OR \"DETOX\"?  Detoxification is the process of allowing the body to rid itself of a drug while managing the symptoms of withdrawal. It is often the first step in a drug treatment program and should be followed by treatment with a behavioral-based therapy and/or a medication, if available. Detox alone with no follow-up is not treatment.   WHAT IS WITHDRAWAL? HOW LONG DOES IT LAST?  Withdrawal is the variety of symptoms that occur after use of some addictive drugs is reduced or stopped. Length of withdrawal and symptoms vary with the type of drug. For example, physical symptoms of heroin withdrawal may include restlessness, muscle and bone pain, insomnia, diarrhea, vomiting, and cold flashes. These physical symptoms may last for several days, but the general depression, or dysphoria (opposite of euphoria), that often accompanies heroin withdrawal, may last for weeks. In many cases withdrawal can be easily treated with medications to ease the symptoms. But treating withdrawal is not the same as treating addiction.   WHAT ARE THE COSTS OF DRUG ABUSE TO " SOCIETY?  Beyond the raw numbers are other costs to society:  · Spread of infectious diseases such as HIV/AIDS and hepatitis C either through sharing of drug paraphernalia or unprotected sex.   · Deaths due to overdose or other complications from drug use.   · Effects on unborn children of pregnant drug users.   · Other effects such as crime and homelessness.   IF A PREGNANT WOMAN ABUSES DRUGS, DOES IT AFFECT THE FETUS?  · Many substances including alcohol, nicotine, and drugs of abuse can have negative effects on the developing fetus because they are transferred to the fetus across the placenta. For example, nicotine has been connected with premature birth and low birth weight, as has the use of cocaine. Scientific studies have shown that babies born to marijuana users were shorter, weighed less, and had smaller head sizes than those born to mothers who did not use the drug. Smaller babies are more likely to develop health problems.   · Whether a baby's health problems, if caused by a drug, will continue as the child grows, is not always known. Research does show that children born to mothers who used marijuana regularly during pregnancy may have trouble concentrating, when older. Our research continues to produce insights on the negative effects of drug use on the fetus.   Document Released: 12/20/2004 Document Revised: 03/11/2013 Document Reviewed: 03/19/2010  ExitCare® Patient Information ©2013 [email or web address].      Follow-up Information     1. Follow up with Pcp Pt States None In 1 week.    Specialty:  Family Medicine         Discharge Medication Instructions:    Below are the medications your physician expects you to take upon discharge:    Review all your home medications and newly ordered medications with your doctor and/or pharmacist. Follow medication instructions as directed by your doctor and/or pharmacist.    Please keep your medication list with you and share with your physician.               Medication  List      START taking these medications        Instructions    Morning Afternoon Evening Bedtime    clindamycin 300 MG Caps   Commonly known as:  CLEOCIN   Next Dose Due:  5/28/17 evening. Then start taking 3 times daily.         Take 1 Cap by mouth 3 times a day for 7 days.   Dose:  300 mg                        metronidazole 500 MG Tabs   Last time this was given:  500 mg on 5/28/2017 11:03 AM   Commonly known as:  FLAGYL   Next Dose Due:  5/28/17 evening.        Take 1 Tab by mouth 3 times a day for 7 days.   Dose:  500 mg                             Where to Get Your Medications      Information about where to get these medications is not yet available     ! Ask your nurse or doctor about these medications    - clindamycin 300 MG Caps  - metronidazole 500 MG Tabs            Instructions           Diet / Nutrition:    Follow any diet instructions given to you by your doctor or the dietician, including how much salt (sodium) you are allowed each day.    If you are overweight, talk to your doctor about a weight reduction plan.    Activity:    Remain physically active following your doctor's instructions about exercise and activity.    Rest often.     Any time you become even a little tired or short of breath, SIT DOWN and rest.    Worsening Symptoms:    Report any of the following signs and symptoms to the doctor's office immediately:    *Pain of jaw, arm, or neck  *Chest pain not relieved by medication                               *Dizziness or loss of consciousness  *Difficulty breathing even when at rest   *More tired than usual                                       *Bleeding drainage or swelling of surgical site  *Swelling of feet, ankles, legs or stomach                 *Fever (>100ºF)  *Pink or blood tinged sputum  *Weight gain (3lbs/day or 5lbs /week)           *Shock from internal defibrillator (if applicable)  *Palpitations or irregular heartbeats                *Cool and/or numb extremities    Stroke  Awareness    Common Risk Factors for Stroke include:    Age  Atrial Fibrillation  Carotid Artery Stenosis  Diabetes Mellitus  Excessive alcohol consumption  High blood pressure  Overweight   Physical inactivity  Smoking    Warning signs and symptoms of a stroke include:    *Sudden numbness or weakness of the face, arm or leg (especially on one side of the body).  *Sudden confusion, trouble speaking or understanding.  *Sudden trouble seeing in one or both eyes.  *Sudden trouble walking, dizziness, loss of balance or coordination.Sudden severe headache with no known cause.    It is very important to get treatment quickly when a stroke occurs. If you experience any of the above warning signs, call 911 immediately.                   Disclaimer         Quit Smoking / Tobacco Use:    I understand the use of any tobacco products increases my chance of suffering from future heart disease or stroke and could cause other illnesses which may shorten my life. Quitting the use of tobacco products is the single most important thing I can do to improve my health. For further information on smoking / tobacco cessation call a Toll Free Quit Line at 1-790.747.4453 (*[employer] [place]) or 1-259.387.2984 (American Lung Association) or you can access the web based program at www.lungusa.org.    [place] Tobacco Users Help Line:  (272) 777-5958       Toll Free: 1-403.225.1526  Quit Tobacco Program [facility] [phone]    Crisis Hotline:    [place] Crisis Hotline:  8-666-QCKTRTW or 1-912.186.6069    [place] Crisis Hotline:    1-870.951.2869 or 788-741-4972    Discharge Survey:   Thank you for choosing [facility]. We hope we did everything we could to make your hospital stay a pleasant one. You may be receiving a phone survey and we would appreciate your time and participation in answering the questions. Your input is very valuable to us in our efforts to improve our service to our patients and their  families.        My signature on this form indicates that:    1. I have reviewed and understand the above information.  2. My questions regarding this information have been answered to my satisfaction.  3. I have formulated a plan with my discharge nurse to obtain my prescribed medications for home.                  Disclaimer         __________________________________                     __________       ________                       Patient Signature                                                 Date                    Time

## 2017-05-26 NOTE — ED PROVIDER NOTES
ED Provider Note    CHIEF COMPLAINT  Chief Complaint   Patient presents with   • Wound Infection   • Vaginal Discharge   • Fever   • Chills       [facility]  Kat Devlin is a 31 y.o. female who presents with history of heroin abuse, skin popping, IV heroin abuse, complains of picking her skin constantly. Capillary lesions on her right face, abdomen and legs. Low back.. No fever no chills no vomiting no diarrhea. At the [facility] triage nurses she had a fever now denies it.    REVIEW OF SYSTEMS  See HPI for further details. History of migraine headaches obesity arthritis type II diabetes depression asthmaDenies other G.I., G.U.. endrocine, cardiovascular, respriatory or neurological problems.  All other systems are negative.     PAST MEDICAL HISTORY  Past Medical History   Diagnosis Date   • Migraines    • Obesity    • Back pain 6/21/2011   • Headache 7/25/2011   • Pain 1/2012     bilat. feet 6/10   • Arthritis      knee and back   • Type II or unspecified type diabetes mellitus without mention of complication, not stated as uncontrolled      diet and oral meds   • Psychiatric disorder      depression and anxiety   • ASTHMA      rarely. uses inhaler exercise inducted   • PCOS (polycystic ovarian syndrome)        FAMILY HISTORY  Family History   Problem Relation Age of Onset   • Non-contributory Mother    • Hyperlipidemia Mother    • Non-contributory Father    • Alcohol/Drug Father    • Arthritis Maternal Grandmother    • Hyperlipidemia Maternal Grandmother    • Heart Disease Maternal Grandfather        SOCIAL HISTORY  Social History     Social History   • Marital Status: Single     Spouse Name: N/A   • Number of Children: N/A   • Years of Education: N/A     Social History Main Topics   • Smoking status: Current Every Day Smoker -- 0.50 packs/day for 18 years     Types: Cigarettes   • Smokeless tobacco: Never Used   • Alcohol Use: No   • Drug Use: Yes     Special: Intravenous      Comment: Meth   • Sexual Activity:      "Partners: Male     Other Topics Concern   • Not on file     Social History Narrative    ** Merged History Encounter **            SURGICAL HISTORY  Past Surgical History   Procedure Laterality Date   • Pr  delivery only     • Repeat c section  2012     Performed by DESIREE TUCKER at [facility]   • Other       bilateral knee surgery   • Other  2014     oral surgery in office   • Tubal coagulation laparoscopic bilateral  2015     Performed by Juan R Sutton M.D. at [facility]   • Dilation and curettage  2015     Performed by Juan R Sutton M.D. at [facility]   • Hysteroscopy novasure-2  2015     Performed by Juan R Sutton M.D. at [facility]       CURRENT MEDICATIONS  Home Medications     **Home medications have not yet been reviewed for this encounter**          ALLERGIES  No Known Allergies    PHYSICAL EXAM  VITAL SIGNS: /80 mmHg  Pulse 102  Temp(Src) 37.1 °C (98.7 °F)  Resp 20  Ht 1.753 m (5' 9\")  Wt 102 kg (224 lb 13.9 oz)  BMI 33.19 kg/m2  SpO2 98%  LMP   Constitutional: Well developed, Well nourished, No acute distress, Non-toxic appearance.   HENT: Normocephalic, Atraumatic, Bilateral external ears normal, Oropharynx moist, No oral exudates, Nose normal.   Eyes: PERRL, EOMI, Conjunctiva normal, No discharge.   Neck: Normal range of motion, No tenderness, Supple, No stridor.   Lymphatic: No lymphadenopathy noted.   Cardiovascular: Normal heart rate, Normal rhythm, No murmurs, No rubs, No gallops.   Thorax & Lungs: Normal breath sounds, No respiratory distress, No wheezing, No chest tenderness.   Abdomen:  No tenderness, no guarding no rigidity and the abdomen is soft.  No masses, No pulsatile masses.  Skin: Warm, Dry,. Multiple excoriations, face and back abdomen lower extremities arms. None appear to be cellulitic.     Back: No tenderness, No CVA tenderness.   Extremities: Intact distal pulses, " redness, left ankle rule out cellulitis, No cyanosis, No clubbing.   Musculoskeletal: Good range of motion in all major joints. No tenderness to palpation or major deformities noted.   Neurologic: Alert & oriented x 3, Normal motor function, Normal sensory function, No focal deficits noted.   Psychiatric: Appears to be depressed, poor judgment, no hallucinations.  Pelvic exam, yellow discharge from os, moderate tenderness with cervical motion.    RADIOLOGY/PROCEDURES  DX-CHEST-PORTABLE (1 VIEW)   Final Result      No radiographic evidence of acute cardiopulmonary process.            COURSE & MEDICAL DECISION MAKING  Pertinent Labs & Imaging studies reviewed. (See chart for details) lactic acid 2.3 white count and normal hematocrit 33 platelet count normal electrolytes, unremarkable renal function and liver function  Normal, urinalysis 10/20 white cells and moderate bacteria      She has a long history of heroin abuse, last IV heroin was yesterday. Complains of fever for last 24 hours although her temperature was normal here. She does have evidence folliculitis, face trunk and extremities. Cellulitis left leg. A pelvic exam she has a yellow discharge concern for STD.. She has multiple problems, elevated lactic acid. I will talk with University or hospitalist about admission, cultures pending  FINAL IMPRESSION  1.   1. Folliculitis    2. Impetigo      1. Folliculitis    2. Impetigo    3. Cellulitis of left lower extremity    4. Vaginal discharge    5. Fever, unspecified fever cause          2.   3.   Disposition:  Hospitalist to admit, multiple antibiotics. Electronically signed by: Galileo Altamirano, 5/26/2017 4:35 AM

## 2017-05-26 NOTE — IP AVS SNAPSHOT
" <p align=\"LEFT\"><IMG SRC=\"//[email or web address]/blob$/[email or web address]\" alt=\"Image\" WIDTH=\"50%\" HEIGHT=\"200\" BORDER=\"\"></p>                   Name:Kat Devlin  Medical Record Number:[id number]  CSN: [id number]    YOB: 1985   Age: 31 y.o.  Sex: female  HT:1.753 m (5' 9\") WT: 102 kg (224 lb 13.9 oz)          Admit Date: 5/26/2017     Discharge Date:   Today's Date: 5/28/2017  Attending Doctor:  Giancarlo Hubbard D.O.                  Allergies:  Review of patient's allergies indicates no known allergies.          Follow-up Information     1. Follow up with Pcp Pt States None In 1 week.    Specialty:  Family Medicine         Medication List      Take these Medications        Instructions    clindamycin 300 MG Caps   Commonly known as:  CLEOCIN    Take 1 Cap by mouth 3 times a day for 7 days.   Dose:  300 mg       metronidazole 500 MG Tabs   Commonly known as:  FLAGYL    Take 1 Tab by mouth 3 times a day for 7 days.   Dose:  500 mg         "

## 2017-05-26 NOTE — PROGRESS NOTES
"Pharmacy Kinetics 31 y.o. female on vancomycin day # 1 2017    Currently on Vancomycin new start    Indication for Treatment: SSTI    Pertinent history per medical record: Admitted on 2017 for fever and folliculitis.  Patient has a history of IDU with skin popping, was recently admitted and discharged with oral antibiotics that she did not complete. She presented to the ED again with lactic acidosis and purulent material from a few of her wounds. Thus, antibiotics were initiated for presumed SSTI.     Other antibiotics: ceftriaxone 2 g IV q24h, metronidazole 500 mg PO q12h    Allergies: Review of patient's allergies indicates no known allergies.     List concerns for renal function: BMI 33    Pertinent cultures to date:    PBC x 2: in process   wound culture: in process   wet prep: positive for trichomonas, chlamydia/gonorrhea pending    Recent Labs      17   0506   WBC  8.3   NEUTSPOLYS  64.80     Recent Labs      17   0506   BUN  14   CREATININE  0.66   ALBUMIN  3.3     Blood pressure 122/70, pulse 77, temperature 37.5 °C (99.5 °F), resp. rate 20, height 1.753 m (5' 9\"), weight 102 kg (224 lb 13.9 oz), SpO2 95 %. Temp (24hrs), Av.3 °C (99.1 °F), Min:37.1 °C (98.7 °F), Max:37.5 °C (99.5 °F)      A/P   1. Vancomycin dose change: initiate 11 mg/kg q8h  2. Next vancomycin level:  @ 0930  3. Goal trough: 12-16 mcg/mL  4. Comments: new start vancomycin for SSTI. Patient has tolerated 11 mg/kg q8h previously with trough in the therapeutic range. Will initiate this dose and check a level at steady state. Renal function currently at baseline. Will continue to follow.    Halle Noble, PHARMD      "

## 2017-05-26 NOTE — ED NOTES
Patient to ED triage with complaints of body aches, fever chills, painful urination and vaginal discharge. She also report sores that have developed all over her body in last 24 hrs. She has a sepsis screening score of 3.    Pt states yesterday am she woke with draining sores to arms, face back and buttock. She started to develop HA, and chills. She does admit to IV drug use but denies skin popping. Sores with scabs present to arm and face. She did take 750 mg acetaminophen prior to arrival.     Pt educated on ED process and asked to wait in lobby. Patient educated on importance of alerting staff to new or worsening symptoms or concerns.

## 2017-05-26 NOTE — PROGRESS NOTES
Pt arrived to unit via gurney from main ER. Pt requesting to sleep at this time, unable to complete assessment. Monitors applied, VS stable. Will continue to monitor, call light within reach.

## 2017-05-26 NOTE — H&P
PRIMARY CARE PHYSICIAN:  None stated.    CHIEF COMPLAINT:  Fever and folliculitis.    HISTORY OF PRESENT ILLNESS:  The patient is a 31-year-old female who came to   the ER with the above.  Per patient, she has been having fever over the past   day or so.  Also, she said that she has folliculitis all over her body and   that is why she came to the hospital.  Patient was just discharged from the   hospital about 3 weeks ago.  At that time from the discharge summary, the   patient had skin popping with numerous _____ all over her body for heroin and   amphetamine abuse.  Apart from that, the patient denied any fever, chills,   chest pain, abdominal pain or any neurological deficit.  Patient will be   admitted to referral for further management.  I expect her hospital stay would   be more than 2 midnights.    ALLERGY HISTORY:  No known drug allergy.    PAST MEDICAL HISTORY:  Cellulitis and hyperglycemia.    SOCIAL HISTORY:  Patient smokes less than half pack a day and the patient   injects both heroin and amphetamine.    FAMILY HISTORY:  No pertinent family history.    PAST SURGICAL HISTORY:  No pertinent past surgical history.    OUTPATIENT MEDICATIONS:  None.    PHYSICAL EXAMINATION:  VITAL SIGNS:  Temperature is 98.7, pulse rate 77, respiratory rate 19, blood   pressure is 105/72, and satting 98% on room air.  GENERAL:  Alert, communicative.  HEENT:  Normocephalic, atraumatic.  NECK:  Supple.  No neck gland enlargement.  CARDIOVASCULAR:  Normal first and second sound.  No murmur.  RESPIRATORY:  Normal respiratory effort.  No wheezing.  ABDOMEN:  Soft, bowel sounds are present.  Nontender.  CENTRAL NERVOUS SYSTEM:  Cranial nerves II-XII are intact.  No neurological   deficit.  EXTREMITIES:  No edema or clubbing.  PSYCHIATRIC:  Normal affect and mood.  SKIN:  Warm and moist.  There is folliculitis all over her body noticed.    LABORATORY DATA:  WBC is 8.3, hemoglobin 11.8, and platelets 259.  Sodium 134,   potassium  2.2, and lactic acid is 2.2.  Urinalysis, leukocyte esterase   positive.    ASSESSMENT AND PLAN:  1.  Fever with generalized folliculitis.  The patient will be treated with   Bactrim and that will also cover for methicillin-resistant Staphylococcus   aureus for her urinary tract infection.  We will follow up on her culture.  2.  Urinary tract infection, plan as above.  3.  History of intravenous heroin and amphetamine abuse.  4.  Nicotine dependence.  I spent more than 10 minutes on smoking cessation   education.  5.  Deep venous thrombosis prophylaxis, on heparin.  6.  Patient is a FULL CODE.  7.  Hypokalemia, replete as needed.  8.  Hyponatremia, on IV fluids.       ____________________________________     MD MYRNA PELAYO / AKIRA    DD:  05/26/2017 09:00:54  DT:  05/26/2017 10:05:03    D#:  [id number]  Job#:  [id number]

## 2017-05-26 NOTE — ED NOTES
Med rec complete per pt at bedside  NKDA  Pt states she did not finish her ABX  Pt states she only took 3 pills     Per home pharmacy pt picked up these on 04/27/17  Augmentin 875/125mg every 12 hours for 13 doses  Doxycycline 100mg every 12 hours for 13 doses

## 2017-05-26 NOTE — PROGRESS NOTES
Pt crying, irritable, hitting bed. Provided reassurance and distraction stimuli.   Medicated for muscle cramping.  Will continue to monitor.

## 2017-05-26 NOTE — IP AVS SNAPSHOT
5/28/2017    Kat Devlin  [address] [place] [address]    Dear Kat:    [facility] wants to ensure your discharge home is safe and you or your loved ones have had all of your questions answered regarding your care after you leave the hospital.    Below is a list of resources and contact information should you have any questions regarding your hospital stay, follow-up instructions, or active medical symptoms.    Questions or Concerns Regarding… Contact   Medical Questions Related to Your Discharge  (7 days a week, 8am-5pm) Contact a Nurse Care Coordinator   [phone]   Medical Questions Not Related to Your Discharge  (24 hours a day / 7 days a week)  Contact the Nurse Health Line   [phone]    Medications or Discharge Instructions Refer to your discharge packet   or contact your [facility] Primary Care Provider   [phone]   Follow-up Appointment(s) Schedule your appointment via [employer]   or contact Scheduling [phone]   Billing Review your statement via [employer]  or contact Billing [phone]   Medical Records Review your records via [employer]   or contact Medical Records [phone]     You may receive a telephone call within two days of discharge. This call is to make certain you understand your discharge instructions and have the opportunity to have any questions answered. You can also easily access your medical information, test results and upcoming appointments via the [employer] free online health management tool. You can learn more and sign up at [employer]. For assistance setting up your [employer] account, please call [phone].    Once again, we want to ensure your discharge home is safe and that you have a clear understanding of any next steps in your care. If you have any questions or concerns, please do not hesitate to contact us, we are here for you. Thank you for choosing [facility] for your healthcare needs.    Sincerely,    Your [facility] Healthcare Team

## 2017-05-26 NOTE — PROGRESS NOTES
Assist RN: Pt ambulated to restroom, gait steady.  Pt refuses to wear hospital socks.  Pt educated on fall risk, still refusing to wear socks.  Nurse informed

## 2017-05-26 NOTE — IP AVS SNAPSHOT
Ecovision Access Code: [email or web address]  Expires: 6/27/2017  1:46 PM    Your email address is not on file at [facility].  Email Addresses are required for you to sign up for Ecovision, please contact [phone] to verify your personal information and to provide your email address prior to attempting to register for Ecovision.    Kat Devlin  [address] [place] [address]    Ecovision  A secure, online tool to manage your health information     [facility]’s Ecovision® is a secure, online tool that connects you to your personalized health information from the privacy of your home -- day or night - making it very easy for you to manage your healthcare. Once the activation process is completed, you can even access your medical information using the Ecovision gisella, which is available for free in the Apple Gisella store or Google Play store.     To learn more about Ecovision, visit [email or web address]    There are two levels of access available (as shown below):   My Chart Features  [facility] Primary Care Doctor [facility]  Specialists [facility]  Urgent  Care Non-[facility] Primary Care Doctor   Email your healthcare team securely and privately 24/7 X X X    Manage appointments: schedule your next appointment; view details of past/upcoming appointments X      Request prescription refills. X      View recent personal medical records, including lab and immunizations X X X X   View health record, including health history, allergies, medications X X X X   Read reports about your outpatient visits, procedures, consult and ER notes X X X X   See your discharge summary, which is a recap of your hospital and/or ER visit that includes your diagnosis, lab results, and care plan X X  X     How to register for I[employer]ixt:  Once your e-mail address has been verified, follow the following steps to sign up for Ecovision.     1. Go to  [email or web address]  2. Click on the Sign Up Now box, which takes you to the New Member Sign Up page. You will need  to provide the following information:  a. Enter your Watchfinder Access Code exactly as it appears at the top of this page. (You will not need to use this code after you’ve completed the sign-up process. If you do not sign up before the expiration date, you must request a new code.)   b. Enter your date of birth.   c. Enter your home email address.   d. Click Submit, and follow the next screen’s instructions.  3. Create a Watchfinder ID. This will be your Watchfinder login ID and cannot be changed, so think of one that is secure and easy to remember.  4. Create a Watchfinder password. You can change your password at any time.  5. Enter your Password Reset Question and Answer. This can be used at a later time if you forget your password.   6. Enter your e-mail address. This allows you to receive e-mail notifications when new information is available in Watchfinder.  7. Click Sign Up. You can now view your health information.    For assistance activating your Watchfinder account, call [phone]

## 2017-05-26 NOTE — PROGRESS NOTES
"Assessment completed. Pt drowsy, irritable. Respirations are even and unlabored on RA. POC updated. Pt educated on room and call light, pt verbalized understanding. Pt reports all over pain at this time. Pt states she is going through heroin detox and \"I will not stay here if all they are going to give me is tylenol\". Paged hospitalist to update.   "

## 2017-05-27 PROBLEM — F11.10 HEROIN ABUSE (HCC): Chronic | Status: ACTIVE | Noted: 2017-05-27

## 2017-05-27 PROBLEM — B95.5 BETA HEMOLYTIC STREPTOCOCCUS CULTURE POSITIVE: Status: ACTIVE | Noted: 2017-05-27

## 2017-05-27 PROBLEM — E87.6 HYPOKALEMIA: Status: ACTIVE | Noted: 2017-05-27

## 2017-05-27 PROBLEM — N39.0 UTI (URINARY TRACT INFECTION): Status: ACTIVE | Noted: 2017-05-27

## 2017-05-27 PROBLEM — T14.8XXA MULTIPLE WOUNDS OF SKIN: Status: ACTIVE | Noted: 2017-05-27

## 2017-05-27 PROBLEM — F11.93 OPIOID WITHDRAWAL (HCC): Status: ACTIVE | Noted: 2017-05-27

## 2017-05-27 PROBLEM — A59.9 TRICHOMONAS VAGINALIS INFECTION: Status: ACTIVE | Noted: 2017-05-27

## 2017-05-27 LAB
ALBUMIN SERPL BCP-MCNC: 3.3 G/DL (ref 3.2–4.9)
ALBUMIN/GLOB SERPL: 0.9 G/DL
ALP SERPL-CCNC: 55 U/L (ref 30–99)
ALT SERPL-CCNC: 39 U/L (ref 2–50)
ANION GAP SERPL CALC-SCNC: 8 MMOL/L (ref 0–11.9)
AST SERPL-CCNC: 34 U/L (ref 12–45)
BILIRUB SERPL-MCNC: 0.9 MG/DL (ref 0.1–1.5)
BUN SERPL-MCNC: 8 MG/DL (ref 8–22)
CALCIUM SERPL-MCNC: 8.6 MG/DL (ref 8.5–10.5)
CHLORIDE SERPL-SCNC: 107 MMOL/L (ref 96–112)
CO2 SERPL-SCNC: 24 MMOL/L (ref 20–33)
CREAT SERPL-MCNC: 0.56 MG/DL (ref 0.5–1.4)
ERYTHROCYTE [DISTWIDTH] IN BLOOD BY AUTOMATED COUNT: 40.5 FL (ref 35.9–50)
GFR SERPL CREATININE-BSD FRML MDRD: >60 ML/MIN/1.73 M 2
GLOBULIN SER CALC-MCNC: 3.7 G/DL (ref 1.9–3.5)
GLUCOSE SERPL-MCNC: 105 MG/DL (ref 65–99)
HCT VFR BLD AUTO: 33.6 % (ref 37–47)
HGB BLD-MCNC: 11.8 G/DL (ref 12–16)
MCH RBC QN AUTO: 31.6 PG (ref 27–33)
MCHC RBC AUTO-ENTMCNC: 35.1 G/DL (ref 33.6–35)
MCV RBC AUTO: 90.1 FL (ref 81.4–97.8)
PLATELET # BLD AUTO: 272 K/UL (ref 164–446)
PMV BLD AUTO: 8.4 FL (ref 9–12.9)
POTASSIUM SERPL-SCNC: 3.3 MMOL/L (ref 3.6–5.5)
PROT SERPL-MCNC: 7 G/DL (ref 6–8.2)
RBC # BLD AUTO: 3.73 M/UL (ref 4.2–5.4)
SODIUM SERPL-SCNC: 139 MMOL/L (ref 135–145)
VANCOMYCIN TROUGH SERPL-MCNC: 12.8 UG/ML (ref 10–20)
WBC # BLD AUTO: 4.5 K/UL (ref 4.8–10.8)

## 2017-05-27 PROCEDURE — 700102 HCHG RX REV CODE 250 W/ 637 OVERRIDE(OP): Performed by: INTERNAL MEDICINE

## 2017-05-27 PROCEDURE — 700111 HCHG RX REV CODE 636 W/ 250 OVERRIDE (IP): Performed by: NURSE PRACTITIONER

## 2017-05-27 PROCEDURE — A9270 NON-COVERED ITEM OR SERVICE: HCPCS | Performed by: INTERNAL MEDICINE

## 2017-05-27 PROCEDURE — 700105 HCHG RX REV CODE 258: Performed by: INTERNAL MEDICINE

## 2017-05-27 PROCEDURE — 80053 COMPREHEN METABOLIC PANEL: CPT

## 2017-05-27 PROCEDURE — 96361 HYDRATE IV INFUSION ADD-ON: CPT

## 2017-05-27 PROCEDURE — 99233 SBSQ HOSP IP/OBS HIGH 50: CPT | Performed by: INTERNAL MEDICINE

## 2017-05-27 PROCEDURE — 96366 THER/PROPH/DIAG IV INF ADDON: CPT

## 2017-05-27 PROCEDURE — 700111 HCHG RX REV CODE 636 W/ 250 OVERRIDE (IP): Performed by: INTERNAL MEDICINE

## 2017-05-27 PROCEDURE — 700102 HCHG RX REV CODE 250 W/ 637 OVERRIDE(OP): Performed by: NURSE PRACTITIONER

## 2017-05-27 PROCEDURE — A9270 NON-COVERED ITEM OR SERVICE: HCPCS | Performed by: NURSE PRACTITIONER

## 2017-05-27 PROCEDURE — 80202 ASSAY OF VANCOMYCIN: CPT

## 2017-05-27 PROCEDURE — 770006 HCHG ROOM/CARE - MED/SURG/GYN SEMI*

## 2017-05-27 PROCEDURE — 96376 TX/PRO/DX INJ SAME DRUG ADON: CPT

## 2017-05-27 PROCEDURE — 85027 COMPLETE CBC AUTOMATED: CPT

## 2017-05-27 PROCEDURE — 96372 THER/PROPH/DIAG INJ SC/IM: CPT

## 2017-05-27 PROCEDURE — 700111 HCHG RX REV CODE 636 W/ 250 OVERRIDE (IP)

## 2017-05-27 PROCEDURE — 700105 HCHG RX REV CODE 258

## 2017-05-27 RX ORDER — METHADONE HYDROCHLORIDE 10 MG/1
10 TABLET ORAL DAILY
Status: DISCONTINUED | OUTPATIENT
Start: 2017-05-27 | End: 2017-05-27

## 2017-05-27 RX ORDER — ZOLPIDEM TARTRATE 5 MG/1
5 TABLET ORAL NIGHTLY PRN
Status: DISCONTINUED | OUTPATIENT
Start: 2017-05-27 | End: 2017-05-28 | Stop reason: HOSPADM

## 2017-05-27 RX ORDER — METHADONE HYDROCHLORIDE 10 MG/1
20 TABLET ORAL DAILY
Status: DISCONTINUED | OUTPATIENT
Start: 2017-05-27 | End: 2017-05-28 | Stop reason: HOSPADM

## 2017-05-27 RX ORDER — POTASSIUM CHLORIDE 20 MEQ/1
40 TABLET, EXTENDED RELEASE ORAL ONCE
Status: COMPLETED | OUTPATIENT
Start: 2017-05-27 | End: 2017-05-27

## 2017-05-27 RX ADMIN — VANCOMYCIN HYDROCHLORIDE 1100 MG: 100 INJECTION, POWDER, LYOPHILIZED, FOR SOLUTION INTRAVENOUS at 02:38

## 2017-05-27 RX ADMIN — TRAMADOL HYDROCHLORIDE 50 MG: 50 TABLET, COATED ORAL at 00:11

## 2017-05-27 RX ADMIN — METHADONE HYDROCHLORIDE 20 MG: 10 TABLET ORAL at 11:57

## 2017-05-27 RX ADMIN — NICOTINE 21 MG: 21 PATCH, EXTENDED RELEASE TRANSDERMAL at 05:29

## 2017-05-27 RX ADMIN — HEPARIN SODIUM 5000 UNITS: 5000 INJECTION, SOLUTION INTRAVENOUS; SUBCUTANEOUS at 05:29

## 2017-05-27 RX ADMIN — LORAZEPAM 1 MG: 2 INJECTION INTRAMUSCULAR; INTRAVENOUS at 00:11

## 2017-05-27 RX ADMIN — STANDARDIZED SENNA CONCENTRATE AND DOCUSATE SODIUM 2 TABLET: 8.6; 5 TABLET, FILM COATED ORAL at 19:57

## 2017-05-27 RX ADMIN — LORAZEPAM 1 MG: 1 TABLET ORAL at 19:57

## 2017-05-27 RX ADMIN — HEPARIN SODIUM 5000 UNITS: 5000 INJECTION, SOLUTION INTRAVENOUS; SUBCUTANEOUS at 14:40

## 2017-05-27 RX ADMIN — LORAZEPAM 1 MG: 1 TABLET ORAL at 11:55

## 2017-05-27 RX ADMIN — HEPARIN SODIUM 5000 UNITS: 5000 INJECTION, SOLUTION INTRAVENOUS; SUBCUTANEOUS at 20:01

## 2017-05-27 RX ADMIN — METRONIDAZOLE 500 MG: 500 TABLET ORAL at 19:57

## 2017-05-27 RX ADMIN — CEFTRIAXONE SODIUM 2 G: 2 INJECTION, POWDER, FOR SOLUTION INTRAMUSCULAR; INTRAVENOUS at 11:56

## 2017-05-27 RX ADMIN — METRONIDAZOLE 500 MG: 500 TABLET ORAL at 09:37

## 2017-05-27 RX ADMIN — STANDARDIZED SENNA CONCENTRATE AND DOCUSATE SODIUM 2 TABLET: 8.6; 5 TABLET, FILM COATED ORAL at 09:37

## 2017-05-27 RX ADMIN — POTASSIUM CHLORIDE 40 MEQ: 1500 TABLET, EXTENDED RELEASE ORAL at 09:37

## 2017-05-27 ASSESSMENT — ENCOUNTER SYMPTOMS
SHORTNESS OF BREATH: 0
SORE THROAT: 0
DIZZINESS: 0
PALPITATIONS: 0
DEPRESSION: 0
NERVOUS/ANXIOUS: 1
CARDIOVASCULAR NEGATIVE: 1
DOUBLE VISION: 0
DIAPHORESIS: 1
DIARRHEA: 0
HEADACHES: 1
EYES NEGATIVE: 1
VOMITING: 0
CHILLS: 1
NAUSEA: 1
CONSTIPATION: 0
FEVER: 1
INSOMNIA: 1
COUGH: 0
MYALGIAS: 1
TINGLING: 0
BRUISES/BLEEDS EASILY: 0
WEAKNESS: 1
FOCAL WEAKNESS: 0
BLURRED VISION: 0
RESPIRATORY NEGATIVE: 1

## 2017-05-27 ASSESSMENT — PAIN SCALES - GENERAL
PAINLEVEL_OUTOF10: 0
PAINLEVEL_OUTOF10: 6
PAINLEVEL_OUTOF10: 10

## 2017-05-27 ASSESSMENT — LIFESTYLE VARIABLES: SUBSTANCE_ABUSE: 1

## 2017-05-27 NOTE — PROGRESS NOTES
ALBINO West unable to obtain ultrasound guided PIV.  Called ER charge, Eliseo states she will send Kia POSADA up to attempt another EJ PIV.

## 2017-05-27 NOTE — PROGRESS NOTES
Micro called with result of positive hip culture: moderate growth bet strep grp ALVARO Rosales NP updated.

## 2017-05-27 NOTE — PROGRESS NOTES
Hospital Medicine Interval Note  Date of Service:  5/27/2017    Chief Complaint  31 y.o.-year-old female admitted 5/26/2017 with h/o IV drug use with skin popping that has multi-wounds throughout body, +trichomonas, +UTI, wound culture + Beta strep GrA, on IV C3 and Flagyl.    Interval Problem Update  5/27- Having s/s of narcotic withdrawal, takes 1gm Heroin daily, added methadone 20mg daily for her withdrawal    Consultants/Specialty  None    Disposition  Cont IV abx, follow cultures, monitor withdrawal closely (methadone/ativan)     Review of Systems   Constitutional: Positive for fever, chills, malaise/fatigue and diaphoresis.   HENT: Negative for congestion and sore throat.    Eyes: Negative.  Negative for blurred vision and double vision.   Respiratory: Negative.  Negative for cough and shortness of breath.    Cardiovascular: Negative.  Negative for chest pain, palpitations and leg swelling.   Gastrointestinal: Positive for nausea. Negative for vomiting, diarrhea and constipation.   Genitourinary: Positive for dysuria, urgency and frequency.        Discharge- foul smelling   Musculoskeletal: Positive for myalgias. Negative for joint pain.   Skin: Positive for itching and rash.        Wounds everywhere   Neurological: Positive for weakness and headaches. Negative for dizziness, tingling and focal weakness.   Endo/Heme/Allergies: Negative.  Does not bruise/bleed easily.   Psychiatric/Behavioral: Positive for substance abuse. Negative for depression. The patient is nervous/anxious and has insomnia.       Physical Exam Laboratory/Imaging   Filed Vitals:    05/27/17 0002 05/27/17 0437 05/27/17 0738 05/27/17 1126   BP: 143/86 147/97 143/93 134/95   Pulse: 85 76 70 73   Temp: 36.4 °C (97.6 °F) 36.3 °C (97.3 °F) 37.1 °C (98.8 °F) 37.2 °C (99 °F)   Resp: 16 18 18 17   Height:       Weight:       SpO2: 98% 98% 97% 95%     Physical Exam   Constitutional: She is oriented to person, place, and time.   Appears much older  that stated age  Poor hygiene, disheveled and malodorous   HENT:   Head: Normocephalic and atraumatic.   Facial hair  Poor dentition  Dry MM   Eyes: Conjunctivae and EOM are normal. Pupils are equal, round, and reactive to light. Right eye exhibits no discharge. Left eye exhibits no discharge. No scleral icterus.   Neck: Normal range of motion. Neck supple. No JVD present. No tracheal deviation present.   Cardiovascular: Normal rate, regular rhythm, normal heart sounds and intact distal pulses.    Pulmonary/Chest: Effort normal and breath sounds normal.   Abdominal: Soft. Bowel sounds are normal. She exhibits no distension. There is no tenderness.   Musculoskeletal: Normal range of motion. She exhibits no edema or tenderness.   Lymphadenopathy:     She has no cervical adenopathy.   Neurological: She is alert and oriented to person, place, and time.   Skin: Skin is warm. No rash noted. She is diaphoretic. There is erythema.   Multiple skin popping wounds throughout body, largest at right hip   Psychiatric:   Writhing in bed, speaks slowly and in low tone, anxious    Lab Results   Component Value Date/Time    WBC 4.5* 05/27/2017 05:43 AM    HEMOGLOBIN 11.8* 05/27/2017 05:43 AM    HEMATOCRIT 33.6* 05/27/2017 05:43 AM    PLATELET COUNT 272 05/27/2017 05:43 AM     Lab Results   Component Value Date/Time    SODIUM 139 05/27/2017 05:43 AM    POTASSIUM 3.3* 05/27/2017 05:43 AM    CHLORIDE 107 05/27/2017 05:43 AM    CO2 24 05/27/2017 05:43 AM    GLUCOSE 105* 05/27/2017 05:43 AM    BUN 8 05/27/2017 05:43 AM    CREATININE 0.56 05/27/2017 05:43 AM      Assessment/Plan    * Fever (present on admission)  Assessment & Plan  Afebrile now  R/t multiple infections  On IV C3, Flagyl    Methamphetamine abuse (present on admission)  Assessment & Plan  IV/IM- says hasn't been doing as much lately- primarily heroin    Heroin abuse (present on admission)  Assessment & Plan  Takes 1gm by IV/IM daily  Does skin pop    Opioid withdrawal  (CMS-[facility]) (present on admission)  Assessment & Plan  Starting to have withdrawal symptoms and severe pains today  Start brief course of methadone 20mg daily, titrate to withdrawal symptoms  Cont ativan prn    Multiple wounds of skin, from skin popping/IV heroin use (present on admission)  Assessment & Plan  With culture pos as above  Wound care consult    Beta hemolytic Streptococcus culture positive, right hip wound (present on admission)  Assessment & Plan  On culture of wound of hip  IV C3/Flagyl  Follow culture    UTI (urinary tract infection) (present on admission)  Assessment & Plan  Cont IV C3/Flagyl  Follow cultures    Trichomonas vaginalis infection (present on admission)  Assessment & Plan  Cont IV rocephin/flagyl  Educate on hygiene, and avoiding risk of re-infection    Depression (present on admission)  Assessment & Plan   regarding self medication  Will need resources for programs/psych outpt- apprec SW assistance    Tobacco use (present on admission)  Assessment & Plan  Counseled >10 min for cessation, not interested  Nicotine patch       EKG reviewed, Radiology images reviewed, Labs reviewed and Medications reviewed  Birch catheter: No Birch      DVT Prophylaxis: Heparin  DVT prophylaxis - mechanical: SCDs  Ulcer prophylaxis: Not indicated  : C3, rocephin.  Assessed for rehab: Patient unable to tolerate rehabilitation therapeutic regimen

## 2017-05-27 NOTE — PROGRESS NOTES
Assessment completed. Pt A&O, sleeping on/off. Respirations are even and unlabored on RA. POC updated. Pt educated on room and call light, pt verbalized understanding. Pt continues to report pain to bilateral lower extremities. Tolerable at this time. Monitors applied, call light within reach. Needs met.

## 2017-05-27 NOTE — PROGRESS NOTES
Assessment completed completed per CDU,poc discussed,verbalized understanding,pt anxious and restless,asking for anxiety meds,medicated,tolerated po well,call button within reach,will continue to monitor.

## 2017-05-27 NOTE — ASSESSMENT & PLAN NOTE
Starting to have withdrawal symptoms and severe pains today  Start brief course of methadone 20mg daily, titrate to withdrawal symptoms  Cont ativan prn

## 2017-05-27 NOTE — DISCHARGE PLANNING
Care Transition Team Assessment    Information Source  Orientation : Oriented x 4  Information Given By: Patient  Informant's Name: Kat Devlin  Who is responsible for making decisions for patient? : Patient    Readmission Evaluation  Is this a readmission?: Yes - unplanned readmission (4th admission this year)  Was an appointment arranged for you prior to discharge?: Yes, did not attend appointment (no show for Dr. Mancera 4/28)  Were there new prescriptions you were supposed to fill after you were discharged?: Yes, prescriptions filled (see pharmacy note)    Elopement Risk  Legal Hold: No  Ambulatory or Self Mobile in Wheelchair: Yes  Disoriented: No  Psychiatric Symptoms: None  History of Wandering: No  Elopement this Admit: No  Vocalizing Wanting to Leave: No  Displays Behaviors, Body Language Wanting to Leave: No-Not at Risk for Elopement    Interdisciplinary Discharge Planning  Does Admitting Nurse Feel This Could be a Complex Discharge?: No  Primary Care Physician: none /referred to   Lives with - Patient's Self Care Capacity: Unable To Determine At This Time (can stay with mom after d/c)  Patient or legal guardian wants to designate a caregiver (see row info): No  Support Systems: Family Member(s)  Housing / Facility: Homeless  Able to Return to Previous ADL's: Yes  Mobility Issues: No  Patient Expects to be Discharged to:: home with mom  Assistance Needed: No  Durable Medical Equipment: Not Applicable              Finances  Financial Barriers to Discharge: No  Prescription Coverage: Yes    Vision / Hearing Impairment  Vision Impairment : No  Hearing Impairment : No    Values / Beliefs / Concerns  Values / Beliefs Concerns : No         Domestic Abuse  Have you ever been the victim of abuse or violence?: No  Physical Abuse or Sexual Abuse: No  Verbal Abuse or Emotional Abuse: No  Possible Abuse Reported to:: Not Applicable    Psychological Assessment  History of Substance Abuse: Heroin,  Methamphetamine  Date Last Used - Heroin: see MD notes    Discharge Risks or Barriers  Discharge risks or barriers?: Substance abuse, Homeless / couch surfing  Patient risk factors: Homeless, Substance abuse

## 2017-05-28 VITALS
TEMPERATURE: 98.2 F | HEIGHT: 69 IN | OXYGEN SATURATION: 96 % | DIASTOLIC BLOOD PRESSURE: 81 MMHG | HEART RATE: 85 BPM | BODY MASS INDEX: 33.31 KG/M2 | WEIGHT: 224.87 LBS | SYSTOLIC BLOOD PRESSURE: 129 MMHG | RESPIRATION RATE: 19 BRPM

## 2017-05-28 PROBLEM — F11.93 OPIOID WITHDRAWAL (HCC): Status: RESOLVED | Noted: 2017-05-27 | Resolved: 2017-05-28

## 2017-05-28 PROBLEM — E87.6 HYPOKALEMIA: Status: RESOLVED | Noted: 2017-05-27 | Resolved: 2017-05-28

## 2017-05-28 PROBLEM — R50.9 FEVER: Status: RESOLVED | Noted: 2017-05-26 | Resolved: 2017-05-28

## 2017-05-28 LAB
ANION GAP SERPL CALC-SCNC: 7 MMOL/L (ref 0–11.9)
BACTERIA UR CULT: NORMAL
BACTERIA WND AEROBE CULT: ABNORMAL
BACTERIA WND AEROBE CULT: ABNORMAL
BUN SERPL-MCNC: 10 MG/DL (ref 8–22)
CALCIUM SERPL-MCNC: 8.4 MG/DL (ref 8.5–10.5)
CHLORIDE SERPL-SCNC: 106 MMOL/L (ref 96–112)
CO2 SERPL-SCNC: 25 MMOL/L (ref 20–33)
CREAT SERPL-MCNC: 0.63 MG/DL (ref 0.5–1.4)
ERYTHROCYTE [DISTWIDTH] IN BLOOD BY AUTOMATED COUNT: 41.2 FL (ref 35.9–50)
GFR SERPL CREATININE-BSD FRML MDRD: >60 ML/MIN/1.73 M 2
GLUCOSE SERPL-MCNC: 102 MG/DL (ref 65–99)
GRAM STN SPEC: ABNORMAL
HCT VFR BLD AUTO: 35.6 % (ref 37–47)
HGB BLD-MCNC: 12.3 G/DL (ref 12–16)
MCH RBC QN AUTO: 31.6 PG (ref 27–33)
MCHC RBC AUTO-ENTMCNC: 34.6 G/DL (ref 33.6–35)
MCV RBC AUTO: 91.5 FL (ref 81.4–97.8)
PLATELET # BLD AUTO: 276 K/UL (ref 164–446)
PMV BLD AUTO: 8.7 FL (ref 9–12.9)
POTASSIUM SERPL-SCNC: 3.4 MMOL/L (ref 3.6–5.5)
RBC # BLD AUTO: 3.89 M/UL (ref 4.2–5.4)
SIGNIFICANT IND 70042: ABNORMAL
SIGNIFICANT IND 70042: NORMAL
SITE SITE: ABNORMAL
SITE SITE: NORMAL
SODIUM SERPL-SCNC: 138 MMOL/L (ref 135–145)
SOURCE SOURCE: ABNORMAL
SOURCE SOURCE: NORMAL
WBC # BLD AUTO: 5.1 K/UL (ref 4.8–10.8)

## 2017-05-28 PROCEDURE — 80048 BASIC METABOLIC PNL TOTAL CA: CPT

## 2017-05-28 PROCEDURE — 700102 HCHG RX REV CODE 250 W/ 637 OVERRIDE(OP): Performed by: NURSE PRACTITIONER

## 2017-05-28 PROCEDURE — 99239 HOSP IP/OBS DSCHRG MGMT >30: CPT | Performed by: INTERNAL MEDICINE

## 2017-05-28 PROCEDURE — 700102 HCHG RX REV CODE 250 W/ 637 OVERRIDE(OP): Performed by: INTERNAL MEDICINE

## 2017-05-28 PROCEDURE — 700111 HCHG RX REV CODE 636 W/ 250 OVERRIDE (IP): Performed by: INTERNAL MEDICINE

## 2017-05-28 PROCEDURE — A9270 NON-COVERED ITEM OR SERVICE: HCPCS | Performed by: INTERNAL MEDICINE

## 2017-05-28 PROCEDURE — 36415 COLL VENOUS BLD VENIPUNCTURE: CPT

## 2017-05-28 PROCEDURE — 85027 COMPLETE CBC AUTOMATED: CPT

## 2017-05-28 PROCEDURE — A9270 NON-COVERED ITEM OR SERVICE: HCPCS | Performed by: NURSE PRACTITIONER

## 2017-05-28 PROCEDURE — 700105 HCHG RX REV CODE 258: Performed by: INTERNAL MEDICINE

## 2017-05-28 RX ORDER — CLINDAMYCIN HYDROCHLORIDE 300 MG/1
300 CAPSULE ORAL 3 TIMES DAILY
Qty: 21 CAP | Refills: 0 | Status: SHIPPED | OUTPATIENT
Start: 2017-05-28 | End: 2017-06-04

## 2017-05-28 RX ORDER — METRONIDAZOLE 500 MG/1
500 TABLET ORAL 3 TIMES DAILY
Qty: 21 TAB | Refills: 0 | Status: SHIPPED | OUTPATIENT
Start: 2017-05-28 | End: 2017-06-04

## 2017-05-28 RX ADMIN — CEFTRIAXONE SODIUM 2 G: 2 INJECTION, POWDER, FOR SOLUTION INTRAMUSCULAR; INTRAVENOUS at 09:00

## 2017-05-28 RX ADMIN — HEPARIN SODIUM 5000 UNITS: 5000 INJECTION, SOLUTION INTRAVENOUS; SUBCUTANEOUS at 05:48

## 2017-05-28 RX ADMIN — NICOTINE 21 MG: 21 PATCH, EXTENDED RELEASE TRANSDERMAL at 05:47

## 2017-05-28 RX ADMIN — STANDARDIZED SENNA CONCENTRATE AND DOCUSATE SODIUM 2 TABLET: 8.6; 5 TABLET, FILM COATED ORAL at 11:03

## 2017-05-28 RX ADMIN — LORAZEPAM 1 MG: 1 TABLET ORAL at 06:32

## 2017-05-28 RX ADMIN — SODIUM CHLORIDE: 9 INJECTION, SOLUTION INTRAVENOUS at 05:49

## 2017-05-28 RX ADMIN — METHADONE HYDROCHLORIDE 20 MG: 10 TABLET ORAL at 11:03

## 2017-05-28 RX ADMIN — METRONIDAZOLE 500 MG: 500 TABLET ORAL at 11:03

## 2017-05-28 RX ADMIN — LORAZEPAM 1 MG: 1 TABLET ORAL at 11:07

## 2017-05-28 ASSESSMENT — PAIN SCALES - GENERAL: PAINLEVEL_OUTOF10: 0

## 2017-05-28 ASSESSMENT — LIFESTYLE VARIABLES: EVER_SMOKED: YES

## 2017-05-28 NOTE — DISCHARGE INSTRUCTIONS
Discharge Instructions    Discharged to home by car with relative. Discharged via wheelchair, hospital escort: Yes.  Special equipment needed: Not Applicable    Be sure to schedule a follow-up appointment with your primary care doctor or any specialists as instructed.     Discharge Plan:   Diet Plan: Discussed  Activity Level: Discussed  Smoking Cessation Offered: Patient Counseled  Confirmed Follow up Appointment: Appointment Scheduled  Confirmed Symptoms Management: Discussed  Medication Reconciliation Updated: Yes  Influenza Vaccine Indication: Patient Refuses    I understand that a diet low in cholesterol, fat, and sodium is recommended for good health. Unless I have been given specific instructions below for another diet, I accept this instruction as my diet prescription.   Other diet: regular    Special Instructions: None    · Is patient discharged on Warfarin / Coumadin?   No     · Is patient Post Blood Transfusion?  No    Depression / Suicide Risk    As you are discharged from this [facility] facility, it is important to learn how to keep safe from harming yourself.    Recognize the warning signs:  · Abrupt changes in personality, positive or negative- including increase in energy   · Giving away possessions  · Change in eating patterns- significant weight changes-  positive or negative  · Change in sleeping patterns- unable to sleep or sleeping all the time   · Unwillingness or inability to communicate  · Depression  · Unusual sadness, discouragement and loneliness  · Talk of wanting to die  · Neglect of personal appearance   · Rebelliousness- reckless behavior  · Withdrawal from people/activities they love  · Confusion- inability to concentrate     If you or a loved one observes any of these behaviors or has concerns about self-harm, here's what you can do:  · Talk about it- your feelings and reasons for harming yourself  · Remove any means that you might use to hurt yourself (examples: pills, rope,  extension cords, firearm)  · Get professional help from the community (Mental Health, Substance Abuse, psychological counseling)  · Do not be alone:Call your Safe Contact- someone whom you trust who will be there for you.  · Call your local CRISIS HOTLINE 473-3926 or 008-799-4983  · Call your local Children's Mobile Crisis Response Team [place] (021) 131-7076 or www.HeiaHeia.com  · Call the toll free National Suicide Prevention Hotlines   · National Suicide Prevention Lifeline 807-563-NSTW (3786)  · National Hope Line Network 800-SUICIDE (338-0320)    Opioid Use Disorder  Opioid use disorder is a mental disorder. It is the continued nonmedical use of opioids in spite of risks to health and well-being. Misused opioids include the street drug heroin. They also include pain medicines such as morphine, hydrocodone, oxycodone, and fentanyl. Opioids are very addictive. People who misuse opioids get an exaggerated feeling of well-being. Opioid use disorder often disrupts activities at home, work, or school. It may cause mental or physical problems.   A family history of opioid use disorder puts you at higher risk of it. People with opioid use disorder often misuse other drugs or have mental illness such as depression, posttraumatic stress disorder, or antisocial personality disorder. They also are at risk of suicide and death from overdose.  SIGNS AND SYMPTOMS   Signs and symptoms of opioid use disorder include:  · Use of opioids in larger amounts or over a longer period than intended.  · Unsuccessful attempts to cut down or control opioid use.  · A lot of time spent obtaining, using, or recovering from the effects of opioids.  · A strong desire or urge to use opioids (craving).  · Continued use of opioids in spite of major problems at work, school, or home because of use.  · Continued use of opioids in spite of relationship problems because of use.  · Giving up or cutting down on important life activities  because of opioid use.  · Use of opioids over and over in situations when it is physically hazardous, such as driving a car.  · Continued use of opioids in spite of a physical problem that is likely related to use. Physical problems can include:  1. Severe constipation.  2. Poor nutrition.  3. Infertility.  4. Tuberculosis.  5. Aspiration pneumonia.  6. Infections such as human immunodeficiency virus (HIV) and hepatitis (from injecting opioids).  · Continued use of opioids in spite of a mental problem that is likely related to use. Mental problems can include:  1. Depression.  2. Anxiety.  3. Hallucinations.  4. Sleep problems.  5. Loss of sexual function.  · Need to use more and more opioids to get the same effect, or lessened effect over time with use of the same amount (tolerance).  · Having withdrawal symptoms when opioid use is stopped, or using opioids to reduce or avoid withdrawal symptoms. Withdrawal symptoms include:  1. Depressed, anxious, or irritable mood.  2. Nausea, vomiting, diarrhea, or intestinal cramping.  3. Muscle aches or spasms.  4. Excessive tearing or runny nose.  5. Dilated pupils, sweating, or hairs standing on end.  6. Yawning.  7. Fever, raised blood pressure, or fast pulse.  8. Restlessness or trouble sleeping. This does not apply to people taking opioids for medical reasons only.  DIAGNOSIS  Opioid use disorder is diagnosed by your health care provider. You may be asked questions about your opioid use and and how it affects your life. A physical exam may be done. A drug screen may be ordered. You may be referred to a mental health professional. The diagnosis of opioid use disorder requires at least two symptoms within 12 months. The type of opioid use disorder you have depends on the number of signs and symptoms you have. The type may be:  · Mild. Two or three signs and symptoms.     · Moderate. Four or five signs and symptoms.    · Severe. Six or more signs and symptoms.  TREATMENT    Treatment is usually provided by mental health professionals with training in substance use disorders. The following options are available:  · Detoxification. This is the first step in treatment for withdrawal. It is medically supervised withdrawal with the use of medicines. These medicines lessen withdrawal symptoms. They also raise the chance of becoming opioid free.  · Counseling, also known as talk therapy. Talk therapy addresses the reasons you use opioids. It also addresses ways to keep you from using again (relapse). The goals of talk therapy are to avoid relapse by:  1. Identifying and avoiding triggers for use.  2. Finding healthy ways to cope with stress.  3. Learning how to handle cravings.  · Support groups. Support groups provide emotional support, advice, and guidance.  · A medicine that blocks opioid receptors in your brain. This medicine can reduce opioid cravings that lead to relapse. This medicine also blocks the desired opioid effect when relapse occurs.  · Opioids that are taken by mouth in place of the misused opioid (opioid maintenance treatment). These medicines satisfy cravings but are safer than commonly misused opioids. This often is the best option for people who continue to relapse with other treatments.  HOME CARE INSTRUCTIONS   · Take medicines only as directed by your health care provider.  · Check with your health care provider before starting new medicines.  · Keep all follow-up visits as directed by your health care provider.  SEEK MEDICAL CARE IF:  · You are not able to take your medicines as directed.  · Your symptoms get worse.  SEEK IMMEDIATE MEDICAL CARE IF:  · You have serious thoughts about hurting yourself or others.  · You may have taken an overdose of opioids.  FOR MORE INFORMATION  · National [place] on Drug Abuse: www.drugabuse.gov  · Substance Abuse and Mental Health Services Administration: www.samhsa.gov     This information is not intended to replace advice given  to you by your health care provider. Make sure you discuss any questions you have with your health care provider.     Document Released: 10/14/2008 Document Revised: 01/08/2016 Document Reviewed: 12/31/2014  [employer] Interactive Patient Education ©2016 [employer]  Addiction and the Family  WHAT IS ADDICTION?  Addiction is a complex disease of the brain. It causes an uncontrollable (compulsive) need for a substance. You can be addicted to alcohol or illegal drugs or to prescription medicines, such as painkillers. Addiction can also be a behavior, like gambling or shopping. The need for the drug or activity can become so strong that you think about it all the time. You can also become physically dependent on a substance.   Addiction can change the way your brain works. Because of these changes, getting more of whatever you are addicted to becomes the most important thing to you and feels better than other activities or relationships. Addiction can lead to changes in health, behavior, emotions, relationships, and choices that affect you and everyone around you.  WHAT ARE COMMON SIGNS OF ADDICTION?  Addiction can cause behavioral and emotional signs, as well as physical signs.  Behavioral signs of addiction may include:  · Having an intense craving for whatever you are addicted to.  · Always thinking about your addiction.  · Planning your life around your addiction.  · Being unable to stop using a substance or participating in a behavior.  · Devoting more time to the addiction. This might mean you no longer go to school or work or spend time with people you enjoy.  · Having an increasing need for money. An addiction might make you ask people for unusual loans or steal items to sell.  · Having exaggerated emotional responses to difficult situations. These may include:  1. Feeling anxious or stressed out.  2. Extreme irritability.  3. Aggression.  4. Lying.  · Continuing with the addiction even after it has caused a  bad outcome, such as:  1. Poor health.  2. Damaged relationships.  3. Money loss.  4. Job loss.  5. Getting hurt or arrested.  · Having trouble being realistic about the negative effects of addiction.   Physical signs of addiction may include:  · Bloodshot eyes.  · Nosebleeds.  · Poor hygiene.  · Changing sleep patterns.  · Shakes and tremors.  · Slurred speech.  · Confusion.  · Unconsciousness.  HOW CAN ADDICTION AFFECT FAMILY MEMBERS?  Addiction affects everyone in a family. It touches all aspects of family life, including finances, communication, work, school, and free time.  Children of an addict might have:   · Birth defects, if the mother was addicted during pregnancy.  · Physical, emotional, and behavioral problems.  · Trouble in school.  · Injury from violence, abuse, or accidents.  · Problems because of neglect.  · A greater risk for addiction later in life.  Parents of an addict might experience:   · Confusion.  · Worry.  · Guilt.  · Fear.  · Sadness.  · A desire to make the addiction seem less of a problem than it is.  · Financial strain.  · Feeling isolated from family and friends.  · Physical health changes from stress or from violent confrontations.  Brothers, sisters, and extended family members of an addict might experience:  · Worry.  · Anger.  · Fear.  · Resentment.  · Confusion.  · A desire to hide the addiction and its impact.  · Financial strain.  HOW DO I KNOW IF TREATMENT FOR ADDICTION IS NEEDED?  Addiction is a progressive disease. Without treatment, addiction can get worse. Living with addiction puts you at higher risk for injury, poor health, lost employment, loss of money, and even death.   You might need treatment for addiction if:  · You have tried to stop or cut down, and you cannot.  · Your addiction is causing physical health problems.  · You find it annoying that your friends and family are concerned about your alcohol or substance use.  · You feel guilty about substance abuse or a  behavior.  · You have lied or tried to hide your addiction.  · You need a particular substance to start your day or calm down.  · You are getting in trouble at school, work, home, or with the police.  · You have done something illegal to support your addiction.  · You are running out of money because of your addiction.  · You have no time for anything other than your addiction.  WHAT TYPES OF TREATMENT OPTIONS ARE AVAILABLE?  Treatment for the Addict  The treatment program that is right for you will depend on many factors, including the type of addiction you have. Treatment programs can be outpatient or inpatient. In an outpatient program, you live at home and go to work but also go to a clinic for treatment. With an inpatient program, you sleep and live at the program facility during treatment. After treatment, you might need a plan for support during recovery. Other treatment options include:  · Medicine.  · Some addictions may be treated with prescription medicines.  · You might also need medicine to treat anxiety or depression.  · Counseling and behavior therapy. Therapy can help individuals and families behave and relate more effectively.  · Support groups. Confidential group therapy, such as twelve-step program, can help individuals and families during treatment and recovery.  Treatment for Family Members   Addiction affects the entire family. Ask your health care provider or counselor to recommend resources for family members. You can call [employer] at 1-561.258.4393 or visit their website at http://www.Lotarisn.org/find-a-group/  WHERE ELSE CAN I GET HELP?  · Ask your health care provider for help finding addiction treatment. These discussions are confidential.  · The National [place] on Alcoholism and Drug Dependence (NCADD). This group has information on treatment centers and programs for people who have an addiction and for family members.  ¨ Their telephone number is 1-430-ESLCALL.  ¨ Their website is  https://ncadd.org/affiliate-network/find-an-affiliate  · The Substance Abuse and Mental Health Services Administration ([facility]). This group will help you find publicly funded treatment centers, help hotlines, and counseling services near you.  ¨ Their telephone number is 7-536-191-CPWF (0305).  ¨ Their website is www.findtreatment.[facility].gov     This information is not intended to replace advice given to you by your health care provider. Make sure you discuss any questions you have with your health care provider.     Document Released: 08/23/2005 Document Revised: 01/08/2016 Document Reviewed: 03/09/2015  [employer] Interactive Patient Education ©2016 [employer]    Drug Abuse, Frequently Asked Questions  Drug addiction is a complex brain disease. It is characterized by compulsive, at times uncontrollable, drug craving, seeking, and use that persists even in the face of extremely negative results. Drug seeking becomes compulsive, in large part as a result of the effects of prolonged drug use on brain functioning and, thus, on behavior. For many people, drug addiction becomes chronic, with relapses possible even after long periods of being off the drug.  HOW QUICKLY CAN I BECOME ADDICTED TO A DRUG?  There is no easy answer to this. If and how quickly you might become addicted to a drug depends on many factors including the biology of your body. All drugs are potentially harmful and may have life-threatening consequences associated with their use. There are also vast differences among individuals in sensitivity to various drugs. While one person may use a drug many times and suffer no ill effects, another person may be particularly vulnerable and overdose or developing a craving with the first use. There is no way of knowing in advance how someone may react.  HOW DO I KNOW IF SOMEONE IS ADDICTED TO DRUGS?  If a person is compulsively seeking and using a drug despite negative consequences (such as loss of job, debt,  physical problems brought on by drug abuse, or family problems) then he or she is probably addicted. Those who screen for drug problems, such as physicians, have developed the CAGE questionnaire. These four simple questions can help detect substance abuse problems:  · Have you ever felt you ought to Cut down on your drinking/drug use?   · Have people ever Annoyed you by criticizing your drinking/drug use?   · Have you ever felt bad or Guilty about your drinking/drug use?   · Have you ever had a drink or taken a drug first thing in the morning to steady your nerves or get rid of a hangover (Eye-opener)?   WHAT ARE THE PHYSICAL SIGNS OF ABUSE OR ADDICTION?  The physical signs of abuse or addiction can vary depending on the person and the drug being abused. For example, someone who abuses marijuana may have a chronic cough or worsening of asthmatic conditions. THC, the chemical in marijuana responsible for producing its effects, is associated with weakening the immune system which makes the user more vulnerable to infections, such as pneumonia. Each drug has short-term and long-term physical effects. Stimulants like cocaine increase heart rate and blood pressure, whereas opioids like heroin may slow the heart rate and reduce breathing (respiration).   ARE THERE EFFECTIVE TREATMENTS FOR DRUG ADDICTION?  Drug addiction can be effectively treated with behavioral-based therapies and, for addiction to some drugs such as heroin or nicotine, medications may be used. Treatment may vary for each person depending on the type of drug(s) being used and multiple courses of treatment may be needed to achieve success. Research has revealed 13 basic principles that underlie effective drug addiction treatment. These are discussed in [employer]'s Principles of Drug Addiction Treatment: A Research-Based Guide.  WHERE CAN I FIND INFORMATION ABOUT DRUG TREATMENT PROGRAMS?  · For referrals to treatment programs, visit the Substance Abuse and  "Mental Health Services Administration online at http://findtreatment.[facility].gov/.   · [employer] publishes an expanding series of treatment manuals, the \"clinical toolbox,\" that gives drug treatment providers research-based information for creating effective treatment programs.   WHAT IS DETOXIFICATION, OR \"DETOX\"?  Detoxification is the process of allowing the body to rid itself of a drug while managing the symptoms of withdrawal. It is often the first step in a drug treatment program and should be followed by treatment with a behavioral-based therapy and/or a medication, if available. Detox alone with no follow-up is not treatment.   WHAT IS WITHDRAWAL? HOW LONG DOES IT LAST?  Withdrawal is the variety of symptoms that occur after use of some addictive drugs is reduced or stopped. Length of withdrawal and symptoms vary with the type of drug. For example, physical symptoms of heroin withdrawal may include restlessness, muscle and bone pain, insomnia, diarrhea, vomiting, and cold flashes. These physical symptoms may last for several days, but the general depression, or dysphoria (opposite of euphoria), that often accompanies heroin withdrawal, may last for weeks. In many cases withdrawal can be easily treated with medications to ease the symptoms. But treating withdrawal is not the same as treating addiction.   WHAT ARE THE COSTS OF DRUG ABUSE TO SOCIETY?  Beyond the raw numbers are other costs to society:  · Spread of infectious diseases such as HIV/AIDS and hepatitis C either through sharing of drug paraphernalia or unprotected sex.   · Deaths due to overdose or other complications from drug use.   · Effects on unborn children of pregnant drug users.   · Other effects such as crime and homelessness.   IF A PREGNANT WOMAN ABUSES DRUGS, DOES IT AFFECT THE FETUS?  · Many substances including alcohol, nicotine, and drugs of abuse can have negative effects on the developing fetus because they are transferred to the fetus " across the placenta. For example, nicotine has been connected with premature birth and low birth weight, as has the use of cocaine. Scientific studies have shown that babies born to marijuana users were shorter, weighed less, and had smaller head sizes than those born to mothers who did not use the drug. Smaller babies are more likely to develop health problems.   · Whether a baby's health problems, if caused by a drug, will continue as the child grows, is not always known. Research does show that children born to mothers who used marijuana regularly during pregnancy may have trouble concentrating, when older. Our research continues to produce insights on the negative effects of drug use on the fetus.   Document Released: 12/20/2004 Document Revised: 03/11/2013 Document Reviewed: 03/19/2010  [employer]® Patient Information ©2013 [employer].

## 2017-05-28 NOTE — PROGRESS NOTES
Pt oriented to room and unit. Pt very sleepy and sedated, when aroused asking for ativan and methadone. Vital signs stable.

## 2017-05-28 NOTE — PROGRESS NOTES
Patient found to be very sedated and difficult to arouse at approx 1400 when RN attempted to prep her for discharge. After looking through her possessions RN found drug paraphernalia, contacted Charge RN who then contacted security. VSS despite sedation. Appeared that patient may have shot up recently in her room. Security took photos and disposed of illicit items. Patient signed dc paperwork, IV removed, security walked patient off the unit.

## 2017-05-28 NOTE — PROGRESS NOTES
Head to toe skin assessment completed with Larissa POSADA. Noted skin pop wounds scattered all about skin head to toe, front and back. Sores are of various stages, from scars to open and red. No pressure ulcers noted.

## 2017-05-28 NOTE — CARE PLAN
Problem: Infection  Goal: Will remain free from infection  Outcome: PROGRESSING AS EXPECTED    Problem: Pain Management  Goal: Pain level will decrease to patient’s comfort goal  Outcome: PROGRESSING AS EXPECTED

## 2017-05-28 NOTE — PROGRESS NOTES
Report given to ALBINO Dubois.  Pt to [id number] via hospital bed.  Belongings, chart and medication with patient.

## 2017-05-30 NOTE — DISCHARGE SUMMARY
DATE OF ADMISSION:  05/26/2017    DATE OF DISCHARGE:  05/28/2017    DISCHARGE DIAGNOSES:  Including the following:  Folliculitis secondary to skin   popping, IV drug abuse.  Specifically, patient has heroin abuse and   methamphetamine abuse and fever, resolved.  Acute opiate withdrawal, resolved.    Patient also had urinary tract infections and right hip wound positive for   beta-hemolytic Streptococcus and depression, tobacco use disorder, Trichomonas   vaginalis infections and hypokalemia.    PERTINENT IMAGING AND PROCEDURE:  Here in the hospital including the   following:  Please note that patient had a chest x-ray done on 05/26/2017 and   the finding of this showed no acute evidence of any cardiopulmonary process.    Patient had microbiology testing, which showed the patient had 2 sets of blood   cultures, both were negative, and urine cultures were negative, but the   urinalysis indicated urinary tract infection with possible leukocyte esterase   and presence of moderate bacteria and 10-20 WBCs.    Please note that patient's wound culture from the right thigh showed many   white blood cell and the wound culture eventually grew out positive for   beta-hemolytic Streptococcus group A, moderate growth.    Please note patient's pregnancy test is negative.    SUMMARY OF HOSPITAL COURSE:  Patient is a 31-year-old female, who came to the   hospital on 05/26/2017.  The chief complaint was fever and skin infections.    Apparently, patient has been doing IV drug abuse and doing a lot of skin   popping in all of her extremities of her choice of drugs including heroin and   methamphetamine.  Patient did have fever and the most concerning wound was the   one in her posterior medial right thigh, and the wound culture was collected.    Please note that patient had an infectious workup and there was a vaginal   wet prep that was done and that was found moderate motile Trichomonas and   patient also had GC chlamydia check,  which was negative.  The wound culture   came back as it growing beta-hemolytic Streptococcus group A and on the 3rd   day of hospital stay, patient's fever resolved and patient's right thigh wound   is subsiding and not much drainage at all.  On palpation, it does not feel   there is any subcutaneous pocket.  With the blood culture coming back as   negative, patient was deemed stable for discharge home.  I advised patient   during hospital stay to stop abusing IV illicit drugs and that this is the   source for her infections, and patient not to share any needles.  I have   advised patient not to scratch her skin wound.    DISCHARGE CONDITION:  Stable.    DISPOSITION:  Home.    ACTIVITY:  As tolerated.    DIET:  Regular diet.    DISCHARGE MEDICATIONS:  Including the following:  Thus including clindamycin   one capsule by mouth three times a day for 7 days for treatment of   beta-hemolytic Streptococcus group A infection and patient also to continue on   Flagyl 500 mg one tablet by mouth three times a day for 7 days.    FOLLOWUP INSTRUCTIONS:  Patient is to follow up with primary care provider   within 1 week.       ____________________________________     DO KADEN Nash / AKIRA    DD:  05/29/2017 06:34:11  DT:  05/29/2017 10:30:02    D#:  [id number]  Job#:  [id number]

## 2017-07-31 ENCOUNTER — HOSPITAL ENCOUNTER (EMERGENCY)
Facility: MEDICAL CENTER | Age: 32
End: 2017-07-31
Attending: EMERGENCY MEDICINE
Payer: MEDICAID

## 2017-07-31 VITALS
WEIGHT: 210.98 LBS | DIASTOLIC BLOOD PRESSURE: 90 MMHG | TEMPERATURE: 97.9 F | SYSTOLIC BLOOD PRESSURE: 138 MMHG | HEART RATE: 90 BPM | HEIGHT: 69 IN | OXYGEN SATURATION: 96 % | RESPIRATION RATE: 18 BRPM | BODY MASS INDEX: 31.25 KG/M2

## 2017-07-31 VITALS
SYSTOLIC BLOOD PRESSURE: 118 MMHG | DIASTOLIC BLOOD PRESSURE: 70 MMHG | RESPIRATION RATE: 18 BRPM | WEIGHT: 211.2 LBS | TEMPERATURE: 97.8 F | OXYGEN SATURATION: 97 % | HEIGHT: 69 IN | HEART RATE: 80 BPM | BODY MASS INDEX: 31.28 KG/M2

## 2017-07-31 DIAGNOSIS — F32.A DEPRESSION, UNSPECIFIED DEPRESSION TYPE: ICD-10-CM

## 2017-07-31 DIAGNOSIS — L73.9 FOLLICULITIS: ICD-10-CM

## 2017-07-31 LAB
GLUCOSE BLD-MCNC: 152 MG/DL (ref 65–99)
POC BREATHALIZER: 0.01 PERCENT (ref 0–0.01)

## 2017-07-31 PROCEDURE — 302970 POC BREATHALIZER

## 2017-07-31 PROCEDURE — 90715 TDAP VACCINE 7 YRS/> IM: CPT | Performed by: EMERGENCY MEDICINE

## 2017-07-31 PROCEDURE — 90791 PSYCH DIAGNOSTIC EVALUATION: CPT

## 2017-07-31 PROCEDURE — 99283 EMERGENCY DEPT VISIT LOW MDM: CPT

## 2017-07-31 PROCEDURE — 90471 IMMUNIZATION ADMIN: CPT

## 2017-07-31 PROCEDURE — 82962 GLUCOSE BLOOD TEST: CPT

## 2017-07-31 PROCEDURE — 700111 HCHG RX REV CODE 636 W/ 250 OVERRIDE (IP): Performed by: EMERGENCY MEDICINE

## 2017-07-31 PROCEDURE — 99284 EMERGENCY DEPT VISIT MOD MDM: CPT

## 2017-07-31 RX ORDER — HYDROCODONE BITARTRATE AND ACETAMINOPHEN 5; 325 MG/1; MG/1
1-2 TABLET ORAL EVERY 6 HOURS PRN
Qty: 10 TAB | Refills: 0 | Status: SHIPPED | OUTPATIENT
Start: 2017-07-31 | End: 2017-09-28

## 2017-07-31 RX ORDER — CEPHALEXIN 500 MG/1
500 CAPSULE ORAL 4 TIMES DAILY
Qty: 20 CAP | Refills: 0 | Status: SHIPPED | OUTPATIENT
Start: 2017-07-31 | End: 2017-08-05

## 2017-07-31 RX ORDER — SULFAMETHOXAZOLE AND TRIMETHOPRIM 800; 160 MG/1; MG/1
1 TABLET ORAL 2 TIMES DAILY
Qty: 10 QUANTITY SUFFICIENT | Refills: 0 | Status: SHIPPED | OUTPATIENT
Start: 2017-07-31 | End: 2017-08-05

## 2017-07-31 RX ADMIN — CLOSTRIDIUM TETANI TOXOID ANTIGEN (FORMALDEHYDE INACTIVATED), CORYNEBACTERIUM DIPHTHERIAE TOXOID ANTIGEN (FORMALDEHYDE INACTIVATED), BORDETELLA PERTUSSIS TOXOID ANTIGEN (GLUTARALDEHYDE INACTIVATED), BORDETELLA PERTUSSIS FILAMENTOUS HEMAGGLUTININ ANTIGEN (FORMALDEHYDE INACTIVATED), BORDETELLA PERTUSSIS PERTACTIN ANTIGEN, AND BORDETELLA PERTUSSIS FIMBRIAE 2/3 ANTIGEN 0.5 ML: 5; 2; 2.5; 5; 3; 5 INJECTION, SUSPENSION INTRAMUSCULAR at 06:19

## 2017-07-31 ASSESSMENT — PAIN SCALES - GENERAL: PAINLEVEL_OUTOF10: 10

## 2017-07-31 ASSESSMENT — LIFESTYLE VARIABLES: DO YOU DRINK ALCOHOL: NO

## 2017-07-31 NOTE — ED NOTES
Patient given discharge paperwork and verbalized understanding. Patient out of ED ambulatory with herself after giving patient crackers and juice due to patient claims that she is hungry. Patient in stable condition and vitals stable and no distress noted.

## 2017-07-31 NOTE — ED NOTES
"Chief Complaint   Patient presents with   • Suicidal Ideation     Pt ambulated to triage said \" I want to hurt/kill myself\". No specific plan.     "

## 2017-07-31 NOTE — CONSULTS
[facility]   TRIAGE ASSESSMENT    Name: Kat Devlin  MRN: [id number]  : 1985  Age: 31 y.o.  Date of assessment: 2017  PCP: Pcp Pt States None  Persons in attendance: Patient    CHIEF COMPLAINT/PRESENTING ISSUE (as stated by pt, rn, erp): This pt presents back in the er after being discharged earlier today for folliculitis and other lesions on her skin. She is a heroin addict who is homeless, destitute,and very hopeless.She has been thinking of overdoseing on pills. She claims she did overdose at age 18.   Chief Complaint   Patient presents with   • Suicidal Ideation        CURRENT LIVING SITUATION/SOCIAL SUPPORT: This pt is  from her  and he has custody od their three boys ages 5-8. Her mother and a sister live locally and she is estranged from them. She has few friends and appears to have very nominal social support presently.  BEHAVIORAL HEALTH TREATMENT HISTORY  Does patient/parent report a history of prior behavioral health treatment for patient?   No:    SAFETY ASSESSMENT - SELF  Does patient acknowledge current or past symptoms of dangerousness to self? Yes hx of od and vague/fleeting si on admission   Does parent/significant other report patient has current or past symptoms of dangerousness to self? N\A  Does presenting problem suggest symptoms of dangerousness to self? No presently denies any si or plans to harm herself    SAFETY ASSESSMENT - OTHERS  Does patient acknowledge current or past symptoms of aggressive behavior or risk to others? no  Does parent/significant other report patient has current or past symptoms of aggressive behavior or risk to others?  N\A  Does presenting problem suggest symptoms of dangerousness to others? No    Crisis Safety Plan completed and copy given to patient? Yes discussed with this pt factors that cause stress, anxiety and depression and ways to reduce those symptoms. She denies access to a firearm and will be discharged to  "transitional housing with psychiatric and medical tx at [facility].     ABUSE/NEGLECT SCREENING  Does patient report feeling “unsafe” in his/her home, or afraid of anyone?  no  Does patient report any history of physical, sexual, or emotional abuse?  no  Does parent or significant other report any of the above? N\A  Is there evidence of neglect by self?  no  Is there evidence of neglect by a caregiver? no  Does the patient/parent report any history of CPS/APS/police involvement related to suspected abuse/neglect or domestic violence? no  Based on the information provided during the current assessment, is a mandated report of suspected abuse/neglect being made?  No    SUBSTANCE USE SCREENING  Yes:  Rogelio all substances used in the past 30 days:heroin addiction the last few years      Last Use Amount   []   Alcohol     []   Marijuana     []   Heroin 12hrs ago 1/8gm a day iv   []   Prescription Opioids  (used without prescription, for    recreation, or in excess of prescribed amount)     []   Other Prescription  (used without prescription, for    recreation, or in excess of prescribed amount)     []   Cocaine      []   Methamphetamine     []   \"\" drugs (ectasy, MDMA)     []   Other substances        UDS results:pending  Breathalyzer results: 0.00    What consequences does the patient associate with any of the above substance use and or addictive behaviors? Relationship problems: , Family problems: , Health problems: , Monetary problems:     Risk factors for detox (check all that apply):  [x]  Seizures   [x]  Diaphoretic (sweating)   [x]  Tremors   []  Hallucinations   []  Increased blood pressure   []  Decreased blood pressure   []  Other   []  None      [] Patient education on risk factors for detoxification and instructed to return to ER as needed. Pt transferred to [facility] for medical tx detox help      MENTAL STATUS   Participation: Active verbal participation, Attentive, Engaged, Open to feedback and " Guarded  Grooming: Disheveled  Orientation: Alert and Fully Oriented  Behavior: Calm  Eye contact: Good  Mood: Depressed and Anxious  Affect: Constricted, Congruent with content, Sad and Anxious  Thought process: Logical  Thought content: Within normal limits  Speech: Rate within normal limits, Volume within normal limits, Soft and Hypotalkative  Perception: Within normal limits  Memory:  No gross evidence of memory deficits  Insight: Adequate  Judgment:  Adequate  Other:    Collateral information:   Source:  [] Significant other present in person:   [] Significant other by telephone  [] [facility]   [x] [facility] Nursing Staff  [x] [facility]  [x] Other: erp    [] Unable to complete full assessment due to:  [] Acute intoxication  [] Patient declined to participate/engage  [] Patient verbally unresponsive  [] Significant cognitive deficits  [] Significant perceptual distortions or behavioral disorganization  [x] Other:      CLINICAL IMPRESSIONS:  Primary: chronic depression and anxiety  Secondary:  Heroin abuse/addiction       IDENTIFIED NEEDS/PLAN:  [Trigger DISPOSITION list for any items marked]    []  Imminent safety risk - self [] Imminent safety risk - others   [x]  Acute substance withdrawl []  Psychosis/Impaired reality testing   [x]  Mood/anxiety [x]  Substance use/Addictive behavior   [x]  Maladaptive behaviro [x]  Parent/child conflict   [x]  Family/Couples conflict [x]  Biomedical   [x]  Housing [x]  Financial   []   Legal  Occupational/Educational   []  Domestic violence []  Other:     Disposition: Actively being addressed by [facility], 12 Step program: na meetings, [facility] and well care sevices    Does patient express agreement with the above plan? yes    Referral appointment(s) scheduled? no    Alert team only:   I have discussed findings and recommendations with Dr. Altamirano who is in agreement with these recommendations.31y female presents with heroin addiction, depression, anxiety  and vague si which is now resolved. She has contracted for her safety and was  Discharged to well care services with des Sanchez with [facility].     Referral documentation sent to the following facilities:  fawad Treadwell R.N.  7/31/2017

## 2017-07-31 NOTE — DISCHARGE INSTRUCTIONS
Folliculitis  Folliculitis is redness, soreness, and swelling (inflammation) of the hair follicles. This condition can occur anywhere on the body. People with weakened immune systems, diabetes, or obesity have a greater risk of getting folliculitis.  CAUSES  · Bacterial infection. This is the most common cause.  · Fungal infection.  · Viral infection.  · Contact with certain chemicals, especially oils and tars.  Long-term folliculitis can result from bacteria that live in the nostrils. The bacteria may trigger multiple outbreaks of folliculitis over time.  SYMPTOMS  Folliculitis most commonly occurs on the scalp, thighs, legs, back, buttocks, and areas where hair is shaved frequently. An early sign of folliculitis is a small, white or yellow, pus-filled, itchy lesion (pustule). These lesions appear on a red, inflamed follicle. They are usually less than 0.2 inches (5 mm) wide. When there is an infection of the follicle that goes deeper, it becomes a boil or furuncle. A group of closely packed boils creates a larger lesion (carbuncle). Carbuncles tend to occur in hairy, sweaty areas of the body.  DIAGNOSIS   Your caregiver can usually tell what is wrong by doing a physical exam. A sample may be taken from one of the lesions and tested in a lab. This can help determine what is causing your folliculitis.  TREATMENT   Treatment may include:  · Applying warm compresses to the affected areas.  · Taking antibiotic medicines orally or applying them to the skin.  · Draining the lesions if they contain a large amount of pus or fluid.  · Laser hair removal for cases of long-lasting folliculitis. This helps to prevent regrowth of the hair.  HOME CARE INSTRUCTIONS  · Apply warm compresses to the affected areas as directed by your caregiver.  · If antibiotics are prescribed, take them as directed. Finish them even if you start to feel better.  · You may take over-the-counter medicines to relieve itching.  · Do not shave irritated  skin.  · Follow up with your caregiver as directed.  SEEK IMMEDIATE MEDICAL CARE IF:   · You have increasing redness, swelling, or pain in the affected area.  · You have a fever.  MAKE SURE YOU:  · Understand these instructions.  · Will watch your condition.  · Will get help right away if you are not doing well or get worse.     This information is not intended to replace advice given to you by your health care provider. Make sure you discuss any questions you have with your health care provider.     Document Released: 02/26/2003 Document Revised: 01/08/2016 Document Reviewed: 03/19/2013  [employer] Interactive Patient Education ©2016 [employer]  Return if fever, vomiting or if no better in 12 hours.

## 2017-07-31 NOTE — DISCHARGE INSTRUCTIONS
Depression, Adult  Depression is feeling sad, low, down in the dumps, blue, gloomy, or empty. In general, there are two kinds of depression:  1. Normal sadness or grief. This can happen after something upsetting. It often goes away on its own within 2 weeks. After losing a loved one (bereavement), normal sadness and grief may last longer than two weeks. It usually gets better with time.  2. Clinical depression. This kind lasts longer than normal sadness or grief. It keeps you from doing the things you normally do in life. It is often hard to function at home, work, or at school. It may affect your relationships with others. Treatment is often needed.  GET HELP RIGHT AWAY IF:  · You have thoughts about hurting yourself or others.  · You lose touch with reality (psychotic symptoms). You may:  · See or hear things that are not real.  · Have untrue beliefs about your life or people around you.  · Your medicine is giving you problems.  MAKE SURE YOU:  · Understand these instructions.  · Will watch your condition.  · Will get help right away if you are not doing well or get worse.     This information is not intended to replace advice given to you by your health care provider. Make sure you discuss any questions you have with your health care provider.     Document Released: 01/20/2012 Document Revised: 01/08/2016 Document Reviewed: 04/18/2013  [employer] Interactive Patient Education ©2016 [employer]    Depression, Adult  Depression refers to feeling sad, low, down in the dumps, blue, gloomy, or empty. In general, there are two kinds of depression:  3. Normal sadness or normal grief. This kind of depression is one that we all feel from time to time after upsetting life experiences, such as the loss of a job or the ending of a relationship. This kind of depression is considered normal, is short lived, and resolves within a few days to 2 weeks. Depression experienced after the loss of a loved one (bereavement) often lasts  longer than 2 weeks but normally gets better with time.  4. Clinical depression. This kind of depression lasts longer than normal sadness or normal grief or interferes with your ability to function at home, at work, and in school. It also interferes with your personal relationships. It affects almost every aspect of your life. Clinical depression is an illness.  Symptoms of depression can also be caused by conditions other than those mentioned above, such as:  · Physical illness. Some physical illnesses, including underactive thyroid gland (hypothyroidism), severe anemia, specific types of cancer, diabetes, uncontrolled seizures, heart and lung problems, strokes, and chronic pain are commonly associated with symptoms of depression.  · Side effects of some prescription medicine. In some people, certain types of medicine can cause symptoms of depression.  · Substance abuse. Abuse of alcohol and illicit drugs can cause symptoms of depression.  SYMPTOMS  Symptoms of normal sadness and normal grief include the following:  · Feeling sad or crying for short periods of time.  · Not caring about anything (apathy).  · Difficulty sleeping or sleeping too much.  · No longer able to enjoy the things you used to enjoy.  · Desire to be by oneself all the time (social isolation).  · Lack of energy or motivation.  · Difficulty concentrating or remembering.  · Change in appetite or weight.  · Restlessness or agitation.  Symptoms of clinical depression include the same symptoms of normal sadness or normal grief and also the following symptoms:  · Feeling sad or crying all the time.  · Feelings of guilt or worthlessness.  · Feelings of hopelessness or helplessness.  · Thoughts of suicide or the desire to harm yourself (suicidal ideation).  · Loss of touch with reality (psychotic symptoms). Seeing or hearing things that are not real (hallucinations) or having false beliefs about your life or the people around you (delusions and  paranoia).  DIAGNOSIS   The diagnosis of clinical depression is usually based on how bad the symptoms are and how long they have lasted. Your health care provider will also ask you questions about your medical history and substance use to find out if physical illness, use of prescription medicine, or substance abuse is causing your depression. Your health care provider may also order blood tests.  TREATMENT   Often, normal sadness and normal grief do not require treatment. However, sometimes antidepressant medicine is given for bereavement to ease the depressive symptoms until they resolve.  The treatment for clinical depression depends on how bad the symptoms are but often includes antidepressant medicine, counseling with a mental health professional, or both. Your health care provider will help to determine what treatment is best for you.  Depression caused by physical illness usually goes away with appropriate medical treatment of the illness. If prescription medicine is causing depression, talk with your health care provider about stopping the medicine, decreasing the dose, or changing to another medicine.  Depression caused by the abuse of alcohol or illicit drugs goes away when you stop using these substances. Some adults need professional help in order to stop drinking or using drugs.  SEEK IMMEDIATE MEDICAL CARE IF:  · You have thoughts about hurting yourself or others.  · You lose touch with reality (have psychotic symptoms).  · You are taking medicine for depression and have a serious side effect.  FOR MORE INFORMATION  · National [place] on Mental Illness: www.bonnie.org   · National [place] of [place] Health: www.nimh.nih.gov      This information is not intended to replace advice given to you by your health care provider. Make sure you discuss any questions you have with your health care provider.     Document Released: 12/15/2001 Document Revised: 01/08/2016 Document Reviewed: 03/18/2013  [employer]  Interactive Patient Education ©2016 [employer].  Return if fever, vomiting or if no better in 12 hours.

## 2017-07-31 NOTE — ED AVS SNAPSHOT
7/31/2017    Kat Devlin  [address] [place] [address]    Dear Kat:    [facility] wants to ensure your discharge home is safe and you or your loved ones have had all of your questions answered regarding your care after you leave the hospital.    Below is a list of resources and contact information should you have any questions regarding your hospital stay, follow-up instructions, or active medical symptoms.    Questions or Concerns Regarding… Contact   Medical Questions Related to Your Discharge  (7 days a week, 8am-5pm) Contact a Nurse Care Coordinator   [phone]   Medical Questions Not Related to Your Discharge  (24 hours a day / 7 days a week)  Contact the Nurse Health Line   [phone]    Medications or Discharge Instructions Refer to your discharge packet   or contact your [facility] Primary Care Provider   [phone]   Follow-up Appointment(s) Schedule your appointment via MobileMD   or contact Scheduling [phone]   Billing Review your statement via MobileMD  or contact Billing [phone]   Medical Records Review your records via MobileMD   or contact Medical Records [phone]     You may receive a telephone call within two days of discharge. This call is to make certain you understand your discharge instructions and have the opportunity to have any questions answered. You can also easily access your medical information, test results and upcoming appointments via the MobileMD free online health management tool. You can learn more and sign up at [email or web address]. For assistance setting up your MobileMD account, please call [phone].    Once again, we want to ensure your discharge home is safe and that you have a clear understanding of any next steps in your care. If you have any questions or concerns, please do not hesitate to contact us, we are here for you. Thank you for choosing [facility] for your healthcare needs.    Sincerely,    Your [facility] Healthcare Team

## 2017-07-31 NOTE — ED AVS SNAPSHOT
Home Care Instructions                                                                                                                Kat Devlin   MRN: [id number]    Department:  [facility], Emergency Dept   Date of Visit:  7/31/2017            [facility], Emergency Dept    [address]    [place] [address]    Phone:  [phone]      You were seen by     Galileo Altamirano M.D.      Your Diagnosis Was     Folliculitis     L73.9       Follow-up Information     1. Follow up with [facility]. Schedule an appointment as soon as possible for a visit today.    Contact information    [address]  [facility] [id number] [phone]        Medication Information     Review all of your home medications and newly ordered medications with your primary doctor and/or pharmacist as soon as possible. Follow medication instructions as directed by your doctor and/or pharmacist.     Please keep your complete medication list with you and share with your physician. Update the information when medications are discontinued, doses are changed, or new medications (including over-the-counter products) are added; and carry medication information at all times in the event of emergency situations.               Medication List      START taking these medications        Instructions    Morning Afternoon Evening Bedtime    cephALEXin 500 MG Caps   Commonly known as:  KEFLEX        Take 1 Cap by mouth 4 times a day for 5 days.   Dose:  500 mg                        hydrocodone-acetaminophen 5-325 MG Tabs per tablet   Commonly known as:  NORCO        Take 1-2 Tabs by mouth every 6 hours as needed.   Dose:  1-2 Tab                        sulfamethoxazole-trimethoprim 800-160 MG tablet   Commonly known as:  BACTRIM DS        Take 1 Tab by mouth 2 times a day for 5 days.   Dose:  1 Tab                             Where to Get Your Medications      These medications were sent to [id number] -  [place], [place] - [address] [place] [address] [place] [address]     Phone:  [phone]    - cephALEXin 500 MG Caps  - sulfamethoxazole-trimethoprim 800-160 MG tablet      You can get these medications from any pharmacy     Bring a paper prescription for each of these medications    - hydrocodone-acetaminophen 5-325 MG Tabs per tablet              Discharge Instructions       Folliculitis  Folliculitis is redness, soreness, and swelling (inflammation) of the hair follicles. This condition can occur anywhere on the body. People with weakened immune systems, diabetes, or obesity have a greater risk of getting folliculitis.  CAUSES  · Bacterial infection. This is the most common cause.  · Fungal infection.  · Viral infection.  · Contact with certain chemicals, especially oils and tars.  Long-term folliculitis can result from bacteria that live in the nostrils. The bacteria may trigger multiple outbreaks of folliculitis over time.  SYMPTOMS  Folliculitis most commonly occurs on the scalp, thighs, legs, back, buttocks, and areas where hair is shaved frequently. An early sign of folliculitis is a small, white or yellow, pus-filled, itchy lesion (pustule). These lesions appear on a red, inflamed follicle. They are usually less than 0.2 inches (5 mm) wide. When there is an infection of the follicle that goes deeper, it becomes a boil or furuncle. A group of closely packed boils creates a larger lesion (carbuncle). Carbuncles tend to occur in hairy, sweaty areas of the body.  DIAGNOSIS   Your caregiver can usually tell what is wrong by doing a physical exam. A sample may be taken from one of the lesions and tested in a lab. This can help determine what is causing your folliculitis.  TREATMENT   Treatment may include:  · Applying warm compresses to the affected areas.  · Taking antibiotic medicines orally or applying them to the skin.  · Draining the lesions if they contain a large amount of  pus or fluid.  · Laser hair removal for cases of long-lasting folliculitis. This helps to prevent regrowth of the hair.  HOME CARE INSTRUCTIONS  · Apply warm compresses to the affected areas as directed by your caregiver.  · If antibiotics are prescribed, take them as directed. Finish them even if you start to feel better.  · You may take over-the-counter medicines to relieve itching.  · Do not shave irritated skin.  · Follow up with your caregiver as directed.  SEEK IMMEDIATE MEDICAL CARE IF:   · You have increasing redness, swelling, or pain in the affected area.  · You have a fever.  MAKE SURE YOU:  · Understand these instructions.  · Will watch your condition.  · Will get help right away if you are not doing well or get worse.     This information is not intended to replace advice given to you by your health care provider. Make sure you discuss any questions you have with your health care provider.     Document Released: 02/26/2003 Document Revised: 01/08/2016 Document Reviewed: 03/19/2013  [employer] Interactive Patient Education ©2016 Elsevier Inc.  Return if fever, vomiting or if no better in 12 hours.          Patient Information     Patient Information    Following emergency treatment: all patient requiring follow-up care must return either to a private physician or a clinic if your condition worsens before you are able to obtain further medical attention, please return to the emergency room.     Billing Information    At [facility], we work to make the billing process streamlined for our patients.  Our Representatives are here to answer any questions you may have regarding your hospital bill.  If you have insurance coverage and have supplied your insurance information to us, we will submit a claim to your insurer on your behalf.  Should you have any questions regarding your bill, we can be reached online or by phone as follows:  Online: You are able pay your bills online or live chat with our  representatives about any billing questions you may have. We are here to help Monday - Friday from 8:00am to 7:30pm and 9:00am - 12:00pm on Saturdays.  Please visit [email or web address]  for more information.   Phone:  [phone] or [phone]    Please note that your emergency physician, surgeon, pathologist, radiologist, anesthesiologist, and other specialists are not employed by [facility] and will therefore bill separately for their services.  Please contact them directly for any questions concerning their bills at the numbers below:     Emergency Physician Services:  [phone]  [facility]:  [phone]  [facility]:  [phone]  [facility]:  [phone]    1. Your final bill may vary from the amount quoted upon discharge if all procedures are not complete at that time, or if your doctor has additional procedures of which we are not aware. You will receive an additional bill if you return to the Emergency Department at [facility] for suture removal regardless of the facility of which the sutures were placed.     2. Please arrange for settlement of this account at the emergency registration.    3. All self-pay accounts are due in full at the time of treatment.  If you are unable to meet this obligation then payment is expected within 4-5 days.     4. If you have had radiology studies (CT, X-ray, Ultrasound, MRI), you have received a preliminary result during your emergency department visit. Please contact the radiology department [phone] to receive a copy of your final result. Please discuss the Final result with your primary physician or with the follow up physician provided.     Crisis Hotline:  [place] Crisis Hotline:  8-881-HWMEVME or 1-229.485.8626  [place] Crisis Hotline:    1-410.294.8184 or 759-930-4558         ED Discharge Follow Up Questions    1. In order to provide you with very good care, we would  like to follow up with a phone call in the next few days.  May we have your permission to contact you?     YES /  NO    2. What is the best phone number to call you? (       )_____-__________    3. What is the best time to call you?      Morning  /  Afternoon  /  Evening                   Patient Signature:  ____________________________________________________________    Date:  ____________________________________________________________

## 2017-07-31 NOTE — ED AVS SNAPSHOT
Home Care Instructions                                                                                                                Kat Devlin   MRN: [id number]    Department:  [facility], Emergency Dept   Date of Visit:  7/31/2017            [facility], Emergency Dept    [address]    [place] [address]    Phone:  [phone]      You were seen by     Galileo Altamirano M.D.      Your Diagnosis Was     Depression, unspecified depression type     F32.9       Follow-up Information     1. Follow up with [facility]. Schedule an appointment as soon as possible for a visit today.    Contact information    [phone]        Medication Information     Review all of your home medications and newly ordered medications with your primary doctor and/or pharmacist as soon as possible. Follow medication instructions as directed by your doctor and/or pharmacist.     Please keep your complete medication list with you and share with your physician. Update the information when medications are discontinued, doses are changed, or new medications (including over-the-counter products) are added; and carry medication information at all times in the event of emergency situations.               Medication List      ASK your doctor about these medications        Instructions    Morning Afternoon Evening Bedtime    cephALEXin 500 MG Caps   Commonly known as:  KEFLEX        Take 1 Cap by mouth 4 times a day for 5 days.   Dose:  500 mg                        hydrocodone-acetaminophen 5-325 MG Tabs per tablet   Commonly known as:  NORCO        Take 1-2 Tabs by mouth every 6 hours as needed.   Dose:  1-2 Tab                        sulfamethoxazole-trimethoprim 800-160 MG tablet   Commonly known as:  BACTRIM DS        Take 1 Tab by mouth 2 times a day for 5 days.   Dose:  1 Tab                                Procedures and tests performed during your visit     ACCU-CHEK  GLUCOSE    POC BREATHALIZER        Discharge Instructions       Depression, Adult  Depression is feeling sad, low, down in the dumps, blue, gloomy, or empty. In general, there are two kinds of depression:  1. Normal sadness or grief. This can happen after something upsetting. It often goes away on its own within 2 weeks. After losing a loved one (bereavement), normal sadness and grief may last longer than two weeks. It usually gets better with time.  2. Clinical depression. This kind lasts longer than normal sadness or grief. It keeps you from doing the things you normally do in life. It is often hard to function at home, work, or at school. It may affect your relationships with others. Treatment is often needed.  GET HELP RIGHT AWAY IF:  · You have thoughts about hurting yourself or others.  · You lose touch with reality (psychotic symptoms). You may:  · See or hear things that are not real.  · Have untrue beliefs about your life or people around you.  · Your medicine is giving you problems.  MAKE SURE YOU:  · Understand these instructions.  · Will watch your condition.  · Will get help right away if you are not doing well or get worse.     This information is not intended to replace advice given to you by your health care provider. Make sure you discuss any questions you have with your health care provider.     Document Released: 01/20/2012 Document Revised: 01/08/2016 Document Reviewed: 04/18/2013  [employer] Interactive Patient Education ©2016 [employer] Inc.    Depression, Adult  Depression refers to feeling sad, low, down in the dumps, blue, gloomy, or empty. In general, there are two kinds of depression:  3. Normal sadness or normal grief. This kind of depression is one that we all feel from time to time after upsetting life experiences, such as the loss of a job or the ending of a relationship. This kind of depression is considered normal, is short lived, and resolves within a few days to 2 weeks. Depression  experienced after the loss of a loved one (bereavement) often lasts longer than 2 weeks but normally gets better with time.  4. Clinical depression. This kind of depression lasts longer than normal sadness or normal grief or interferes with your ability to function at home, at work, and in school. It also interferes with your personal relationships. It affects almost every aspect of your life. Clinical depression is an illness.  Symptoms of depression can also be caused by conditions other than those mentioned above, such as:  · Physical illness. Some physical illnesses, including underactive thyroid gland (hypothyroidism), severe anemia, specific types of cancer, diabetes, uncontrolled seizures, heart and lung problems, strokes, and chronic pain are commonly associated with symptoms of depression.  · Side effects of some prescription medicine. In some people, certain types of medicine can cause symptoms of depression.  · Substance abuse. Abuse of alcohol and illicit drugs can cause symptoms of depression.  SYMPTOMS  Symptoms of normal sadness and normal grief include the following:  · Feeling sad or crying for short periods of time.  · Not caring about anything (apathy).  · Difficulty sleeping or sleeping too much.  · No longer able to enjoy the things you used to enjoy.  · Desire to be by oneself all the time (social isolation).  · Lack of energy or motivation.  · Difficulty concentrating or remembering.  · Change in appetite or weight.  · Restlessness or agitation.  Symptoms of clinical depression include the same symptoms of normal sadness or normal grief and also the following symptoms:  · Feeling sad or crying all the time.  · Feelings of guilt or worthlessness.  · Feelings of hopelessness or helplessness.  · Thoughts of suicide or the desire to harm yourself (suicidal ideation).  · Loss of touch with reality (psychotic symptoms). Seeing or hearing things that are not real (hallucinations) or having false  beliefs about your life or the people around you (delusions and paranoia).  DIAGNOSIS   The diagnosis of clinical depression is usually based on how bad the symptoms are and how long they have lasted. Your health care provider will also ask you questions about your medical history and substance use to find out if physical illness, use of prescription medicine, or substance abuse is causing your depression. Your health care provider may also order blood tests.  TREATMENT   Often, normal sadness and normal grief do not require treatment. However, sometimes antidepressant medicine is given for bereavement to ease the depressive symptoms until they resolve.  The treatment for clinical depression depends on how bad the symptoms are but often includes antidepressant medicine, counseling with a mental health professional, or both. Your health care provider will help to determine what treatment is best for you.  Depression caused by physical illness usually goes away with appropriate medical treatment of the illness. If prescription medicine is causing depression, talk with your health care provider about stopping the medicine, decreasing the dose, or changing to another medicine.  Depression caused by the abuse of alcohol or illicit drugs goes away when you stop using these substances. Some adults need professional help in order to stop drinking or using drugs.  SEEK IMMEDIATE MEDICAL CARE IF:  · You have thoughts about hurting yourself or others.  · You lose touch with reality (have psychotic symptoms).  · You are taking medicine for depression and have a serious side effect.  FOR MORE INFORMATION  · National [place] on Mental Illness: www.bonnie.org   · National [place] of Mental Health: www.nimh.nih.gov      This information is not intended to replace advice given to you by your health care provider. Make sure you discuss any questions you have with your health care provider.     Document Released: 12/15/2001 Document  Revised: 01/08/2016 Document Reviewed: 03/18/2013  [employer] Interactive Patient Education ©2016 [employer] Inc.  Return if fever, vomiting or if no better in 12 hours.          Patient Information     Patient Information    Following emergency treatment: all patient requiring follow-up care must return either to a private physician or a clinic if your condition worsens before you are able to obtain further medical attention, please return to the emergency room.     Billing Information    At [facility], we work to make the billing process streamlined for our patients.  Our Representatives are here to answer any questions you may have regarding your hospital bill.  If you have insurance coverage and have supplied your insurance information to us, we will submit a claim to your insurer on your behalf.  Should you have any questions regarding your bill, we can be reached online or by phone as follows:  Online: You are able pay your bills online or live chat with our representatives about any billing questions you may have. We are here to help Monday - Friday from 8:00am to 7:30pm and 9:00am - 12:00pm on Saturdays.  Please visit [email or web address]  for more information.   Phone:  [phone] or [phone]    Please note that your emergency physician, surgeon, pathologist, radiologist, anesthesiologist, and other specialists are not employed by [facility] and will therefore bill separately for their services.  Please contact them directly for any questions concerning their bills at the numbers below:     Emergency Physician Services:  [phone]  [facility]:  [phone]  [facility]:  [phone]  [facility]:  [phone]    1. Your final bill may vary from the amount quoted upon discharge if all procedures are not complete at that time, or if your doctor has additional procedures of which we are not aware. You will receive an  additional bill if you return to the Emergency Department at [facility] for suture removal regardless of the facility of which the sutures were placed.     2. Please arrange for settlement of this account at the emergency registration.    3. All self-pay accounts are due in full at the time of treatment.  If you are unable to meet this obligation then payment is expected within 4-5 days.     4. If you have had radiology studies (CT, X-ray, Ultrasound, MRI), you have received a preliminary result during your emergency department visit. Please contact the radiology department [phone] to receive a copy of your final result. Please discuss the Final result with your primary physician or with the follow up physician provided.     Crisis Hotline:  [place] Crisis Hotline:  3-757-VEFIYIC or 1-689.153.6018  [place] Crisis Hotline:    1-873.962.6393 or 978-171-0628         ED Discharge Follow Up Questions    1. In order to provide you with very good care, we would like to follow up with a phone call in the next few days.  May we have your permission to contact you?     YES /  NO    2. What is the best phone number to call you? (       )_____-__________    3. What is the best time to call you?      Morning  /  Afternoon  /  Evening                   Patient Signature:  ____________________________________________________________    Date:  ____________________________________________________________

## 2017-07-31 NOTE — ED NOTES
"Chief Complaint   Patient presents with   • Wound Infection     Patient ambulatory to triage. Patient has multiple wounds to legs. Recently admitted for and discharged for the same. Patient states that she now has more of them. Patient appears slightly sedated. Has a history IV drug use. /113 mmHg  Pulse 101  Temp(Src) 36.6 °C (97.9 °F) (Temporal)  Resp 18  Ht 1.753 m (5' 9\")  Wt 95.7 kg (210 lb 15.7 oz)  BMI 31.14 kg/m2  SpO2 96%    "

## 2017-07-31 NOTE — ED AVS SNAPSHOT
SDI Access Code: [email or web address]  Expires: 8/30/2017  6:14 AM    Your email address is not on file at [facility].  Email Addresses are required for you to sign up for SDI, please contact [phone] to verify your personal information and to provide your email address prior to attempting to register for SDI.    Kat Devlin  [address] [place] [address]    SDI  A secure, online tool to manage your health information     [employer]’s SDI® is a secure, online tool that connects you to your personalized health information from the privacy of your home -- day or night - making it very easy for you to manage your healthcare. Once the activation process is completed, you can even access your medical information using the SDI gisella, which is available for free in the Apple Gisella store or Google Play store.     To learn more about SDI, visit [email or web address]    There are two levels of access available (as shown below):   My Chart Features  [facility] Primary Care Doctor [facility]  Specialists [facility]  Urgent  Care Non-[facility] Primary Care Doctor   Email your healthcare team securely and privately 24/7 X X X    Manage appointments: schedule your next appointment; view details of past/upcoming appointments X      Request prescription refills. X      View recent personal medical records, including lab and immunizations X X X X   View health record, including health history, allergies, medications X X X X   Read reports about your outpatient visits, procedures, consult and ER notes X X X X   See your discharge summary, which is a recap of your hospital and/or ER visit that includes your diagnosis, lab results, and care plan X X  X     How to register for [employer]:  Once your e-mail address has been verified, follow the following steps to sign up for SDI.     1. Go to  [email or web address]  2. Click on the Sign Up Now box, which takes you to the New Member Sign Up page. You will need  to provide the following information:  a. Enter your StayTuned Access Code exactly as it appears at the top of this page. (You will not need to use this code after you’ve completed the sign-up process. If you do not sign up before the expiration date, you must request a new code.)   b. Enter your date of birth.   c. Enter your home email address.   d. Click Submit, and follow the next screen’s instructions.  3. Create a StayTuned ID. This will be your StayTuned login ID and cannot be changed, so think of one that is secure and easy to remember.  4. Create a StayTuned password. You can change your password at any time.  5. Enter your Password Reset Question and Answer. This can be used at a later time if you forget your password.   6. Enter your e-mail address. This allows you to receive e-mail notifications when new information is available in StayTuned.  7. Click Sign Up. You can now view your health information.    For assistance activating your StayTuned account, call [phone]

## 2017-07-31 NOTE — ED NOTES
Assumed care of patient. Patient complains of multiple wounds on right leg with redness noted around two wounds. Patient claims that's she hasn't used in 2 days and only does IV heroin in her arms and denies skin popping.  Patient alert and oriented x 4. Patient denies any other complaints at this time. Patient side rails up x 2 and call bell within reach.

## 2017-07-31 NOTE — ED AVS SNAPSHOT
[employer] Access Code: [email or web address]  Expires: 8/30/2017  6:14 AM    Your email address is not on file at [facility].  Email Addresses are required for you to sign up for [employer], please contact [phone] to verify your personal information and to provide your email address prior to attempting to register for [employer].    Kat Devlin  [address] [place] [address]    [employer]  A secure, online tool to manage your health information     [facility]’s [employer]® is a secure, online tool that connects you to your personalized health information from the privacy of your home -- day or night - making it very easy for you to manage your healthcare. Once the activation process is completed, you can even access your medical information using the iQuest Analytics gisella, which is available for free in the Apple Gisella store or Google Play store.     To learn more about [employer], visit [email or web address] Analytics    There are two levels of access available (as shown below):   My Chart Features  [facility] Primary Care Doctor [facility]  Specialists [facility]  Urgent  Care Non-[facility] Primary Care Doctor   Email your healthcare team securely and privately 24/7 X X X    Manage appointments: schedule your next appointment; view details of past/upcoming appointments X      Request prescription refills. X      View recent personal medical records, including lab and immunizations X X X X   View health record, including health history, allergies, medications X X X X   Read reports about your outpatient visits, procedures, consult and ER notes X X X X   See your discharge summary, which is a recap of your hospital and/or ER visit that includes your diagnosis, lab results, and care plan X X  X     How to register for [employer]:  Once your e-mail address has been verified, follow the following steps to sign up for [employer].     1. Go to  [email or web address]  2. Click on the Sign Up Now box, which takes you to the New Member Sign Up page. You will need  to provide the following information:  a. Enter your [employer] Access Code exactly as it appears at the top of this page. (You will not need to use this code after you’ve completed the sign-up process. If you do not sign up before the expiration date, you must request a new code.)   b. Enter your date of birth.   c. Enter your home email address.   d. Click Submit, and follow the next screen’s instructions.  3. Create a Terabitz ID. This will be your Terabitz login ID and cannot be changed, so think of one that is secure and easy to remember.  4. Create a Terabitz password. You can change your password at any time.  5. Enter your Password Reset Question and Answer. This can be used at a later time if you forget your password.   6. Enter your e-mail address. This allows you to receive e-mail notifications when new information is available in Terabitz.  7. Click Sign Up. You can now view your health information.    For assistance activating your [employer] account, call [phone]

## 2017-07-31 NOTE — ED AVS SNAPSHOT
7/31/2017    Kat Devlin  [address] [place] [address]    Dear Kat:    [facility] wants to ensure your discharge home is safe and you or your loved ones have had all of your questions answered regarding your care after you leave the hospital.    Below is a list of resources and contact information should you have any questions regarding your hospital stay, follow-up instructions, or active medical symptoms.    Questions or Concerns Regarding… Contact   Medical Questions Related to Your Discharge  (7 days a week, 8am-5pm) Contact a Nurse Care Coordinator   [phone]   Medical Questions Not Related to Your Discharge  (24 hours a day / 7 days a week)  Contact the Nurse Health Line   [phone]    Medications or Discharge Instructions Refer to your discharge packet   or contact your [facility] Primary Care Provider   [phone]   Follow-up Appointment(s) Schedule your appointment via [employer]   or contact Scheduling [phone]   Billing Review your statement via [employer]  or contact Billing [phone]   Medical Records Review your records via [employer]   or contact Medical Records [phone]     You may receive a telephone call within two days of discharge. This call is to make certain you understand your discharge instructions and have the opportunity to have any questions answered. You can also easily access your medical information, test results and upcoming appointments via the [employer] free online health management tool. You can learn more and sign up at [employer]. For assistance setting up your [employer] account, please call [phone].    Once again, we want to ensure your discharge home is safe and that you have a clear understanding of any next steps in your care. If you have any questions or concerns, please do not hesitate to contact us, we are here for you. Thank you for choosing [facility] for your healthcare needs.    Sincerely,    Your [facility] Healthcare Team

## 2017-07-31 NOTE — ED NOTES
Well care at bedside. Pt given discharge instructions. Pt verbalized understanding. VSS no acute distress noted, pt able to ambulate without difficulty.

## 2017-07-31 NOTE — ED PROVIDER NOTES
ED Provider Note    CHIEF COMPLAINT  Chief Complaint   Patient presents with   • Wound Infection       HPI  Kat Devlin is a 31 y.o. female who presents with lesions on her scan, for several years, worse today. No fever no chills no vomiting no diarrhea. Long history of skin popping long history of folliculitis. Worse lesion is on her right lower leg, posterior.    REVIEW OF SYSTEMS  See HPI for further details. History of polycystic ovary disease History of migraine headaches and obesity, skin popping diabetes depression asthmaDenies other G.I., G.U.. endrocine, cardiovascular, respriatory or neurological problems.  All other systems are negative.     PAST MEDICAL HISTORY  Past Medical History   Diagnosis Date   • Migraines    • Obesity    • Back pain 6/21/2011   • Headache 7/25/2011   • Pain 1/2012     bilat. feet 6/10   • Arthritis      knee and back   • Type II or unspecified type diabetes mellitus without mention of complication, not stated as uncontrolled      diet and oral meds   • Psychiatric disorder      depression and anxiety   • ASTHMA      rarely. uses inhaler exercise inducted   • PCOS (polycystic ovarian syndrome)        FAMILY HISTORY  Family History   Problem Relation Age of Onset   • Non-contributory Mother    • Hyperlipidemia Mother    • Non-contributory Father    • Alcohol/Drug Father    • Arthritis Maternal Grandmother    • Hyperlipidemia Maternal Grandmother    • Heart Disease Maternal Grandfather        SOCIAL HISTORY  Social History     Social History   • Marital Status: Single     Spouse Name: N/A   • Number of Children: N/A   • Years of Education: N/A     Social History Main Topics   • Smoking status: Current Every Day Smoker -- 1.00 packs/day for 18 years     Types: Cigarettes   • Smokeless tobacco: Never Used   • Alcohol Use: No   • Drug Use: Yes     Special: Intravenous      Comment: Heroin injects in arms only   • Sexual Activity:     Partners: Male     Other Topics Concern   •  "None     Social History Narrative    ** Merged History Encounter **            SURGICAL HISTORY  Past Surgical History   Procedure Laterality Date   • Pr  delivery only     • Repeat c section  2012     Performed by DESIREE TUCKER at [facility]   • Other       bilateral knee surgery   • Other  2014     oral surgery in office   • Tubal coagulation laparoscopic bilateral  2015     Performed by Juan R Sutton M.D. at [facility]   • Dilation and curettage  2015     Performed by Juan R Sutton M.D. at [facility]   • Hysteroscopy novasure-2  2015     Performed by Juan R Sutton M.D. at [facility]       CURRENT MEDICATIONS  Home Medications     Reviewed by Vira Sahni R.N. (Registered Nurse) on 17 at 0457  Med List Status: Complete    Medication Last Dose Status          Patient Devin Taking any Medications                        ALLERGIES  No Known Allergies    PHYSICAL EXAM  VITAL SIGNS: /113 mmHg  Pulse 101  Temp(Src) 36.6 °C (97.9 °F) (Temporal)  Resp 18  Ht 1.753 m (5' 9\")  Wt 95.7 kg (210 lb 15.7 oz)  BMI 31.14 kg/m2  SpO2 96%  Constitutional: Well developed, Well nourished, No acute distress, Non-toxic appearance.   HENT: Normocephalic, Atraumatic, Bilateral external ears normal, Oropharynx moist, No oral exudates, Nose normal.   Eyes: PERRL, EOMI, Conjunctiva normal, No discharge.   Neck: Normal range of motion, No tenderness, Supple, No stridor.   Lymphatic: No lymphadenopathy noted.   Cardiovascular: Normal heart rate, Normal rhythm, No murmurs, No rubs, No gallops.   Thorax & Lungs: Normal breath sounds, No respiratory distress, No wheezing, No chest tenderness.   Abdomen:  No tenderness, no guarding no rigidity and the abdomen is soft.  No masses, No pulsatile masses.  Skin: Warm, folliculitis, mostly on the legs, multiple lesions, one lesion on her right leg is about an inch in " diameter however I do not see evidence of fluctuance and I don't think it is amenable to being drained.  Back: No tenderness, No CVA tenderness.   Extremities: Intact distal pulses, No edema, No tenderness, No cyanosis, No clubbing.   Musculoskeletal: Good range of motion in all major joints. No tenderness to palpation or major deformities noted.   Neurologic: Alert & oriented x 3, Normal motor function, Normal sensory function, No focal deficits noted.   Psychiatric: Affect normal, Judgment normal, Mood normal.       RADIOLOGY/PROCEDURES      COURSE & MEDICAL DECISION MAKING  Pertinent Labs & Imaging studies reviewed. (See chart for details)    The patient most likely has a folliculitis, she is denying any skin popping. She does have multiple lesions, pustules, lower extremities however I do not see any areas that need to be drained. I will place her on Septra, Keflex, Norco for pain. I have checked with pharmacy database. Tetanus immunization in addition  FINAL IMPRESSION  1.   1. Folliculitis        2.   3.     Disposition  Discharge instructions are understood. This patient is to return if fever vomiting or no better in 12 hours. [facility] up with the [facility] clinic or private physician. Information sheets on folliculitis  Electronically signed by: Galileo Altamirano, 7/31/2017 6:03 AM

## 2017-07-31 NOTE — ED NOTES
Pt changed into gown. One bag labeled and placed into ambulance bay per protocol. poc breathalyzer completed.

## 2017-09-28 ENCOUNTER — HOSPITAL ENCOUNTER (EMERGENCY)
Facility: MEDICAL CENTER | Age: 32
End: 2017-09-28
Attending: EMERGENCY MEDICINE
Payer: MEDICAID

## 2017-09-28 VITALS
HEART RATE: 98 BPM | DIASTOLIC BLOOD PRESSURE: 68 MMHG | SYSTOLIC BLOOD PRESSURE: 118 MMHG | HEIGHT: 69 IN | RESPIRATION RATE: 14 BRPM | TEMPERATURE: 98.7 F | OXYGEN SATURATION: 97 % | WEIGHT: 211.2 LBS | BODY MASS INDEX: 31.28 KG/M2

## 2017-09-28 DIAGNOSIS — S01.01XA SCALP LACERATION, INITIAL ENCOUNTER: ICD-10-CM

## 2017-09-28 PROCEDURE — 304999 HCHG REPAIR-SIMPLE/INTERMED LEVEL 1

## 2017-09-28 PROCEDURE — 304217 HCHG IRRIGATION SYSTEM

## 2017-09-28 PROCEDURE — 99283 EMERGENCY DEPT VISIT LOW MDM: CPT

## 2017-09-28 PROCEDURE — 700101 HCHG RX REV CODE 250: Performed by: EMERGENCY MEDICINE

## 2017-09-28 PROCEDURE — 305308 HCHG STAPLER,SKIN,DISP.

## 2017-09-28 RX ORDER — LIDOCAINE HCL/EPINEPHRINE/PF 2%-1:200K
10 VIAL (ML) INJECTION ONCE
Status: COMPLETED | OUTPATIENT
Start: 2017-09-28 | End: 2017-09-28

## 2017-09-28 RX ADMIN — LIDOCAINE HYDROCHLORIDE,EPINEPHRINE BITARTRATE 10 ML: 20; .005 INJECTION, SOLUTION EPIDURAL; INFILTRATION; INTRACAUDAL; PERINEURAL at 11:15

## 2017-09-28 ASSESSMENT — PAIN SCALES - WONG BAKER: WONGBAKER_NUMERICALRESPONSE: HURTS JUST A LITTLE BIT

## 2017-09-28 ASSESSMENT — PAIN SCALES - GENERAL: PAINLEVEL_OUTOF10: 0

## 2017-09-28 ASSESSMENT — LIFESTYLE VARIABLES: DO YOU DRINK ALCOHOL: NO
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
## 2018-03-27 ENCOUNTER — OFFICE VISIT (OUTPATIENT)
Dept: URGENT CARE | Facility: PHYSICIAN GROUP | Age: 33
End: 2018-03-27
Payer: MEDICAID

## 2018-03-27 VITALS
BODY MASS INDEX: 34.96 KG/M2 | RESPIRATION RATE: 16 BRPM | DIASTOLIC BLOOD PRESSURE: 70 MMHG | HEIGHT: 69 IN | SYSTOLIC BLOOD PRESSURE: 130 MMHG | HEART RATE: 84 BPM | WEIGHT: 236 LBS | TEMPERATURE: 99.5 F | OXYGEN SATURATION: 100 %

## 2018-03-27 DIAGNOSIS — S90.822A BLISTER OF LEFT FOOT, INITIAL ENCOUNTER: ICD-10-CM

## 2018-03-27 DIAGNOSIS — Z76.5 MALINGERING: ICD-10-CM

## 2018-03-27 PROCEDURE — 99202 OFFICE O/P NEW SF 15 MIN: CPT | Performed by: PHYSICIAN ASSISTANT

## 2018-03-31 NOTE — PROGRESS NOTES
Chief Complaint   Patient presents with   • Blister     Left;        HISTORY OF PRESENT ILLNESS: Patient is a 32 y.o. female who presents today due to her feet hurting from blisters.  Patient notes she has been walking a lot, is homeless and walked her from a truck stop where she was dropped off.  Patient admits she is very tired and wanted somewhere she could rest for a few mins...  Hx of DM.  Has not had any regular medical care in some time.  Does have hx of IV drug abuse and skin wounds/infections per chart.     Patient Active Problem List    Diagnosis Date Noted   • Heroin abuse 2017     Priority: High   • Multiple wounds of skin, from skin popping/IV heroin use 2017     Priority: High   • Beta hemolytic Streptococcus culture positive, right hip wound 2017     Priority: High   • Methamphetamine abuse 2017     Priority: High   • Submandibular swelling 2016     Priority: High   • Pharyngitis 2016     Priority: High   • UTI (urinary tract infection) 2017     Priority: Medium   • Trichomonas vaginalis infection 2017     Priority: Medium   • Morbid obesity (CMS-HCC) 2016     Priority: Medium   • Depression 2015     Priority: Low   • Tobacco use 2015     Priority: Low   • Cellulitis 2017   • Pain, abdominal 2015   • High risk sexual behavior 2015   • S/P tubal ligation 2015   • S/P knee surgery 2015   • Morbid obesity with BMI of 40.0-44.9, adult (CMS-HCC) 2015   • Type 2 diabetes mellitus with hyperglycemia (CMS-HCC) 2015   • Hx of opioid abuse 2015   • Exercise-induced asthma 2015   • Dysmenorrhea 2015   • Previous  section 2012   • Headache 2011       Allergies:Patient has no known allergies.    No current [facility]-ordered outpatient prescriptions on file.     No current [facility]-ordered facility-administered medications on file.        Past Medical History:   Diagnosis Date   •  "Arthritis     knee and back   • ASTHMA     rarely. uses inhaler exercise inducted   • Back pain 2011   • Headache(784.0) 2011   • Migraines    • Obesity    • Pain 2012    bilat. feet 6/10   • PCOS (polycystic ovarian syndrome)    • Psychiatric disorder     depression and anxiety   • Type II or unspecified type diabetes mellitus without mention of complication, not stated as uncontrolled     diet and oral meds       Social History   Substance Use Topics   • Smoking status: Current Every Day Smoker     Packs/day: 1.00     Years: 18.00     Types: Cigarettes   • Smokeless tobacco: Never Used   • Alcohol use No       Family Status   Relation Status   • Mother Alive   • Father    • Sister Alive   • Maternal Grandfather      Family History   Problem Relation Age of Onset   • Non-contributory Mother    • Hyperlipidemia Mother    • Non-contributory Father    • Alcohol/Drug Father    • Arthritis Maternal Grandmother    • Hyperlipidemia Maternal Grandmother    • Heart Disease Maternal Grandfather        ROS:  Review of Systems   Constitutional: Negative for fever, chills, weight loss and malaise/fatigue.   Eyes: Negative for blurred vision.   Respiratory: Negative for cough, sputum production, shortness of breath and wheezing.    Cardiovascular: Negative for chest pain, palpitations, orthopnea and leg swelling.   Skin: Bilateral foot irritation/soreness/blisters.    Exam:  Blood pressure 130/70, pulse 84, temperature 37.5 °C (99.5 °F), resp. rate 16, height 1.753 m (5' 9\"), weight 107 kg (236 lb), SpO2 100 %.  General:  Well nourished, well developed female in NAD however laying on exam table, not wanting to sick up noted she is tired and wanting to rest.   Eyes: PERRLA, EOM within normal limits, no conjunctival injection, no scleral icterus, visual fields and acuity grossly intact.  Nose: Symmetrical, , no discharge.   Neck: no masses, range of motion within normal limits, no tracheal deviation. No " "lymphadenopathy  Pulmonary: Normal respiratory effort  Cardiovascular: regular rate  Skin: No visible rashes or lesion. Warm, pink, dry.  Bilateral feet with skin intact, no sores or lesions. No erythema, swelling.  Edges of feet with few small intact blisters noted.   Extremities: no clubbing, cyanosis, or edema.  Neuro: A&O x 3. Speech normal/clear.  Normal gait.         Assessment/Plan:  1. Blister of left foot, initial encounter     2. Malingering           -patient with some small, non infected blistering/callus of feet due to walking however no additional medical treatment needed at this time.  Given some bandages to help cushion her feet from rubbing.   Patient admitting \"I have nowhere else to go and needed to lay down and nap for a bit\"  Explained to her that unfortunately we cannot have her do that here.  Practice manager and clinic staff assisted patient in gathering information and resources to get her a shelter nearby.         Magaly Rene, PSARAH.    "

## 2018-05-22 ENCOUNTER — HOSPITAL ENCOUNTER (EMERGENCY)
Facility: MEDICAL CENTER | Age: 33
End: 2018-05-22
Attending: EMERGENCY MEDICINE
Payer: MEDICAID

## 2018-05-22 VITALS
HEART RATE: 79 BPM | RESPIRATION RATE: 16 BRPM | DIASTOLIC BLOOD PRESSURE: 84 MMHG | BODY MASS INDEX: 36.29 KG/M2 | TEMPERATURE: 98.9 F | OXYGEN SATURATION: 98 % | SYSTOLIC BLOOD PRESSURE: 132 MMHG | HEIGHT: 69 IN | WEIGHT: 245 LBS

## 2018-05-22 DIAGNOSIS — L02.91 ABSCESS: ICD-10-CM

## 2018-05-22 PROCEDURE — 700101 HCHG RX REV CODE 250

## 2018-05-22 PROCEDURE — 303977 HCHG I & D

## 2018-05-22 PROCEDURE — 700102 HCHG RX REV CODE 250 W/ 637 OVERRIDE(OP): Performed by: EMERGENCY MEDICINE

## 2018-05-22 PROCEDURE — 99284 EMERGENCY DEPT VISIT MOD MDM: CPT

## 2018-05-22 PROCEDURE — 304217 HCHG IRRIGATION SYSTEM

## 2018-05-22 PROCEDURE — A9270 NON-COVERED ITEM OR SERVICE: HCPCS | Performed by: EMERGENCY MEDICINE

## 2018-05-22 RX ORDER — SULFAMETHOXAZOLE AND TRIMETHOPRIM 800; 160 MG/1; MG/1
1 TABLET ORAL ONCE
Status: COMPLETED | OUTPATIENT
Start: 2018-05-22 | End: 2018-05-22

## 2018-05-22 RX ORDER — LIDOCAINE HYDROCHLORIDE 20 MG/ML
INJECTION, SOLUTION INFILTRATION; PERINEURAL
Status: COMPLETED
Start: 2018-05-22 | End: 2018-05-22

## 2018-05-22 RX ORDER — LIDOCAINE HYDROCHLORIDE 20 MG/ML
20 INJECTION, SOLUTION INFILTRATION; PERINEURAL ONCE
Status: COMPLETED | OUTPATIENT
Start: 2018-05-22 | End: 2018-05-22

## 2018-05-22 RX ORDER — SULFAMETHOXAZOLE AND TRIMETHOPRIM 800; 160 MG/1; MG/1
1 TABLET ORAL 2 TIMES DAILY
Qty: 14 TAB | Refills: 0 | Status: SHIPPED | OUTPATIENT
Start: 2018-05-22 | End: 2018-05-29

## 2018-05-22 RX ADMIN — LIDOCAINE HYDROCHLORIDE: 20 INJECTION, SOLUTION INFILTRATION; PERINEURAL at 03:45

## 2018-05-22 RX ADMIN — SULFAMETHOXAZOLE AND TRIMETHOPRIM 1 TABLET: 800; 160 TABLET ORAL at 04:07

## 2018-05-22 ASSESSMENT — LIFESTYLE VARIABLES: DO YOU DRINK ALCOHOL: NO

## 2018-05-22 ASSESSMENT — PAIN SCALES - GENERAL: PAINLEVEL_OUTOF10: 3

## 2018-05-22 NOTE — ED PROVIDER NOTES
ED Provider Note    Scribed for Gonzalo Logan M.D. by Gonzalo Logan. 2018,  3:14 AM.    CHIEF COMPLAINT  Chief Complaint   Patient presents with   • Abscess     Rt upper forearm abscess, used IV Heroin, Hx of skin popping. Denies fever chills, N/V.        HPI  Kat Devlin is a 32 y.o. female with a history of chronic IV and skin popping heroin abuse, associated skin infections and abscesses, psychiatric illness and chronic pain, and type II diabetes who presents to the Emergency Department out of concern for possible abscess to her right upper forearm, from jugular venous. She denies fevers, chills, nausea, vomiting, chest pain or shortness of breath. She is calm, pleasant, cooperative, and reports that this swelling on her right forearm has been present for a couple days. It did not improve with topical rubbing alcohol treatment.      REVIEW OF SYSTEMS  See HPI for further details. All other systems are negative.     PAST MEDICAL HISTORY   has a past medical history of Arthritis; ASTHMA; Back pain (2011); Headache(784.0) (2011); Migraines; Obesity; Pain (2012); PCOS (polycystic ovarian syndrome); Psychiatric disorder; and Type II or unspecified type diabetes mellitus without mention of complication, not stated as uncontrolled.    SOCIAL HISTORY  Social History     Social History Main Topics   • Smoking status: Current Every Day Smoker     Packs/day: 1.00     Years: 18.00     Types: Cigarettes   • Smokeless tobacco: Never Used   • Alcohol use No   • Drug use: Yes     Types: Intravenous      Comment: Heroin injects in arms only   • Sexual activity: Yes     Partners: Male     History   Drug Use   • Types: Intravenous     Comment: Heroin injects in arms only       SURGICAL HISTORY   has a past surgical history that includes  delivery only; repeat c section (2012); other; other (2014); tubal coagulation laparoscopic bilateral (2015); dilation and curettage (2015);  "and hysteroscopy novasure-2 (2/20/2015).    CURRENT MEDICATIONS  Home Medications     Reviewed by Lucy Crump R.N. (Registered Nurse) on 05/22/18 at 0316  Med List Status: Complete   Medication Last Dose Status        Patient Devin Taking any Medications                       ALLERGIES  No Known Allergies    PHYSICAL EXAM  VITAL SIGNS: /68   Pulse 87   Temp 37.3 °C (99.1 °F)   Resp 18   Ht 1.753 m (5' 9\")   Wt 111.1 kg (245 lb)   SpO2 99%   BMI 36.18 kg/m²   Pulse ox interpretation: I interpret this pulse ox as normal.  Constitutional: Alert in no apparent distress.  HENT: No signs of trauma, Bilateral external ears normal, Nose normal.   Eyes: Pupils are equal and reactive, Conjunctiva normal, Non-icteric.   Neck: Normal range of motion, No tenderness, Supple, No stridor.    Cardiovascular: Normal peripheral perfusion  Thorax & Lungs: Unlabored respirations, equal chest expansion, no accessory muscle use  Abdomen: Non-distended  Skin: 4 x 4 centimeter area of mild erythema and swelling to the dorsal right forearm, which is confirmed as an abscess by bedside ultrasound.  Back: Normal alignment and ROM  Extremities: No gross deformity  Musculoskeletal: Good range of motion in all major joints.   Neurologic: Alert, Normal motor function, No focal deficits noted.   Psychiatric: Affect normal, Judgment normal, Mood normal.      DIAGNOSTIC STUDIES / PROCEDURES    Incision and Drainage Procedure Note    Indication: Abscess    Procedure: The patient was positioned appropriately and the skin over the incision site was prepped with alcohol. Local anesthesia was obtained by infiltration using 2% Lidocaine with epinephrine.  An incision was then made over the apex of the lesion and approximately 5 cc of thick, foul smelling, purulent and bloody material was expressed. Loculations were not present. The drainage cavity was then irrigated. The patient’s tetanus status was up to date and did not require a booster " dose.    The patient tolerated the procedure well.    Complications: None        COURSE & MEDICAL DECISION MAKING  Nursing notes, VS, PMSFHx reviewed in chart.     3:14 AM Patient seen and examined at bedside. She has a clinically obvious, ultrasound confirmed a right forearm abscess with mild cellulitis, but no systemic symptoms. She'll be treated with incision and drainage, 1st dose of oral Bactrim, and then discharged with a prescription for twice a day Bactrim.    3:51 AM incision and drainage procedure completed as above. Patient discharged in improved condition.       The patient will return for new or worsening symptoms and is stable at the time of discharge.    The patient is referred to a primary physician for blood pressure management, diabetic screening, and for all other preventative health concerns.      DISPOSITION:  Patient will be discharged home in stable condition.    FOLLOW UP:  No follow-up provider specified.    OUTPATIENT MEDICATIONS:  New Prescriptions    SULFAMETHOXAZOLE-TRIMETHOPRIM (BACTRIM DS) 800-160 MG TABLET    Take 1 Tab by mouth 2 times a day for 7 days.         FINAL IMPRESSION  1. Abscess

## 2018-05-22 NOTE — ED NOTES
WOUND    RUE irrigated with 150mL NS after abscess care by ERP. Wound lac wrapped with coban and covered with 4x4 sterile gauze. Bleeding controlled

## 2018-05-22 NOTE — ED NOTES
"Kat Devlin  32 y.o.  /68   Pulse 87   Temp 37.3 °C (99.1 °F)   Resp 18   Ht 1.753 m (5' 9\")   Wt 111.1 kg (245 lb)   SpO2 99%   BMI 36.18 kg/m²   Chief Complaint   Patient presents with   • Abscess     Rt upper forearm abscess, used IV Heroin, Hx of skin popping. Denies fever chills, N/V.      A&Ox4, ambulates with steady gait. Pt would like wound tx. Does not want care or interventions for drug use  "

## 2018-05-23 ENCOUNTER — PATIENT OUTREACH (OUTPATIENT)
Dept: HEALTH INFORMATION MANAGEMENT | Facility: OTHER | Age: 33
End: 2018-05-23

## 2018-05-23 NOTE — LETTER
Kat Devlin  [address] [address]  [place] [address]    May 23, 2018      Dear Kat Devlin,    [facility] wants to ensure your discharge home is safe and you or your loved ones have had all of your questions answered regarding your care after you leave the hospital.    Our discharge team was unsuccessful in our attempts to contact you telephonically and we wanted to be sure that you had a list of resources and contact information should you have any questions regarding your hospital stay, follow-up instructions, or active medical symptoms.    Questions or Concerns Regarding… Contact   Medical Questions Related to Your Discharge  (7 days a week, 8am-5pm) Contact a Nurse Care Coordinator   [phone]   Medical Questions Not Related to Your Discharge  (24 hours a day / 7 days a week)  Contact the Nurse Health Line   [phone]    Medications or Discharge Instructions Refer to your discharge packet   or contact your -[phone]   Follow-up Appointment(s) Schedule your appointment via [employer]   or contact Scheduling [phone]   Billing Review your statement via [employer]  or contact Billing [phone]   Medical Records Review your records via [employer]   or contact Medical Records [phone]     You can also easily access your medical information, test results and upcoming appointments via the [employer] free online health management tool. You can learn more and sign up at [employer]. For assistance setting up your [employer] account, please call [phone].    Once again, we want to ensure your discharge home is safe and that you have a clear understanding of any next steps in your care. If you have any questions or concerns, please do not hesitate to contact us, we are here for you. Thank you for choosing [facility] for your healthcare needs.    Sincerely,      Your [facility] Healthcare Team

## 2018-12-20 ENCOUNTER — HOSPITAL ENCOUNTER (INPATIENT)
Dept: HOSPITAL 8 - ED | Age: 33
LOS: 3 days | Discharge: LEFT BEFORE DISCHARGE | DRG: 871 | End: 2018-12-23
Attending: HOSPITALIST | Admitting: HOSPITALIST
Payer: COMMERCIAL

## 2018-12-20 VITALS — WEIGHT: 247.8 LBS | HEIGHT: 69 IN | BODY MASS INDEX: 36.7 KG/M2

## 2018-12-20 DIAGNOSIS — F17.210: ICD-10-CM

## 2018-12-20 DIAGNOSIS — B19.20: ICD-10-CM

## 2018-12-20 DIAGNOSIS — S83.011A: ICD-10-CM

## 2018-12-20 DIAGNOSIS — F32.9: ICD-10-CM

## 2018-12-20 DIAGNOSIS — F15.10: ICD-10-CM

## 2018-12-20 DIAGNOSIS — M19.90: ICD-10-CM
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
## 2018-12-20 DIAGNOSIS — G92: ICD-10-CM

## 2018-12-20 DIAGNOSIS — M00.9: ICD-10-CM

## 2018-12-20 DIAGNOSIS — E11.9: ICD-10-CM

## 2018-12-20 DIAGNOSIS — F11.23: ICD-10-CM

## 2018-12-20 DIAGNOSIS — Z82.49: ICD-10-CM
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
## 2018-12-20 DIAGNOSIS — Z86.14: ICD-10-CM

## 2018-12-20 DIAGNOSIS — E43: ICD-10-CM

## 2018-12-20 DIAGNOSIS — E66.01: ICD-10-CM

## 2018-12-20 DIAGNOSIS — A41.9: Primary | ICD-10-CM

## 2018-12-20 DIAGNOSIS — L03.115: ICD-10-CM

## 2018-12-20 DIAGNOSIS — J45.909: ICD-10-CM

## 2018-12-20 DIAGNOSIS — Z53.21: ICD-10-CM

## 2018-12-20 LAB
BASOPHILS # BLD AUTO: 0.08 X10^3/UL (ref 0–0.1)
BASOPHILS NFR BLD AUTO: 1 % (ref 0–1)
EOSINOPHIL # BLD AUTO: 0.28 X10^3/UL (ref 0–0.4)
EOSINOPHIL NFR BLD AUTO: 3 % (ref 1–7)
ERYTHROCYTE [DISTWIDTH] IN BLOOD BY AUTOMATED COUNT: 12.4 % (ref 9.6–15.2)
HCT (SEDRATE): 37 % (ref 34.6–47.8)
LYMPHOCYTES # BLD AUTO: 2.63 X10^3/UL (ref 1–3.4)
LYMPHOCYTES NFR BLD AUTO: 23 % (ref 22–44)
MCH RBC QN AUTO: 31.9 PG (ref 27–34.8)
MCHC RBC AUTO-ENTMCNC: 34.4 G/DL (ref 32.4–35.8)
MCV RBC AUTO: 92.8 FL (ref 80–100)
MD: NO
MONOCYTES # BLD AUTO: 0.67 X10^3/UL (ref 0.2–0.8)
MONOCYTES NFR BLD AUTO: 6 % (ref 2–9)
NEUTROPHILS # BLD AUTO: 7.77 X10^3/UL (ref 1.8–6.8)
NEUTROPHILS NFR BLD AUTO: 68 % (ref 42–75)
PLATELET # BLD AUTO: 282 X10^3/UL (ref 130–400)
PMV BLD AUTO: 6.9 FL (ref 7.4–10.4)
RBC # BLD AUTO: 3.96 X10^6/UL (ref 3.82–5.3)

## 2018-12-20 PROCEDURE — 83605 ASSAY OF LACTIC ACID: CPT

## 2018-12-20 PROCEDURE — 87521 HEPATITIS C PROBE&RVRS TRNSC: CPT

## 2018-12-20 PROCEDURE — 87806 HIV AG W/HIV1&2 ANTB W/OPTIC: CPT

## 2018-12-20 PROCEDURE — 80202 ASSAY OF VANCOMYCIN: CPT

## 2018-12-20 PROCEDURE — 99285 EMERGENCY DEPT VISIT HI MDM: CPT

## 2018-12-20 PROCEDURE — 87070 CULTURE OTHR SPECIMN AEROBIC: CPT

## 2018-12-20 PROCEDURE — 80048 BASIC METABOLIC PNL TOTAL CA: CPT

## 2018-12-20 PROCEDURE — 89060 EXAM SYNOVIAL FLUID CRYSTALS: CPT

## 2018-12-20 PROCEDURE — 85025 COMPLETE CBC W/AUTO DIFF WBC: CPT

## 2018-12-20 PROCEDURE — 86141 C-REACTIVE PROTEIN HS: CPT

## 2018-12-20 PROCEDURE — 82945 GLUCOSE OTHER FLUID: CPT

## 2018-12-20 PROCEDURE — 83615 LACTATE (LD) (LDH) ENZYME: CPT

## 2018-12-20 PROCEDURE — 85651 RBC SED RATE NONAUTOMATED: CPT

## 2018-12-20 PROCEDURE — 85810 BLOOD VISCOSITY EXAMINATION: CPT

## 2018-12-20 PROCEDURE — 96372 THER/PROPH/DIAG INJ SC/IM: CPT

## 2018-12-20 PROCEDURE — 96375 TX/PRO/DX INJ NEW DRUG ADDON: CPT

## 2018-12-20 PROCEDURE — 86803 HEPATITIS C AB TEST: CPT

## 2018-12-20 PROCEDURE — 87205 SMEAR GRAM STAIN: CPT

## 2018-12-20 PROCEDURE — G0475 HIV COMBINATION ASSAY: HCPCS

## 2018-12-20 PROCEDURE — 84157 ASSAY OF PROTEIN OTHER: CPT

## 2018-12-20 PROCEDURE — 89050 BODY FLUID CELL COUNT: CPT

## 2018-12-20 PROCEDURE — 36415 COLL VENOUS BLD VENIPUNCTURE: CPT

## 2018-12-20 PROCEDURE — 96365 THER/PROPH/DIAG IV INF INIT: CPT

## 2018-12-20 PROCEDURE — 84560 ASSAY OF URINE/URIC ACID: CPT

## 2018-12-20 PROCEDURE — 82040 ASSAY OF SERUM ALBUMIN: CPT

## 2018-12-20 PROCEDURE — 80053 COMPREHEN METABOLIC PANEL: CPT

## 2018-12-20 PROCEDURE — 93005 ELECTROCARDIOGRAM TRACING: CPT

## 2018-12-20 PROCEDURE — 87040 BLOOD CULTURE FOR BACTERIA: CPT

## 2018-12-21 VITALS — SYSTOLIC BLOOD PRESSURE: 108 MMHG | DIASTOLIC BLOOD PRESSURE: 71 MMHG

## 2018-12-21 VITALS — DIASTOLIC BLOOD PRESSURE: 69 MMHG | SYSTOLIC BLOOD PRESSURE: 106 MMHG

## 2018-12-21 VITALS — DIASTOLIC BLOOD PRESSURE: 68 MMHG | SYSTOLIC BLOOD PRESSURE: 108 MMHG

## 2018-12-21 LAB
ALBUMIN SERPL-MCNC: 3.1 G/DL (ref 3.4–5)
ANION GAP SERPL CALC-SCNC: 8 MMOL/L (ref 5–15)
CALCIUM SERPL-MCNC: 8.4 MG/DL (ref 8.5–10.1)
CHLORIDE SERPL-SCNC: 103 MMOL/L (ref 98–107)
CREAT SERPL-MCNC: 0.84 MG/DL (ref 0.55–1.02)
ERYTHROCYTE [SEDIMENTATION RATE] IN BLOOD BY WESTERGREN METHOD: 33 MM/HR (ref 0–20)
L PNEUMO AB SER IA-ACNC: 2.5 MG/DL (ref 0.02–0.3)

## 2018-12-21 PROCEDURE — 0S9C3ZZ DRAINAGE OF RIGHT KNEE JOINT, PERCUTANEOUS APPROACH: ICD-10-PCS | Performed by: HOSPITALIST

## 2018-12-21 RX ADMIN — VANCOMYCIN HYDROCHLORIDE SCH MLS/HR: 1 INJECTION, POWDER, LYOPHILIZED, FOR SOLUTION INTRAVENOUS at 22:21

## 2018-12-21 RX ADMIN — GABAPENTIN PRN MG: 100 CAPSULE ORAL at 19:30

## 2018-12-21 RX ADMIN — METHADONE HYDROCHLORIDE PRN MG: 10 CONCENTRATE ORAL at 19:59

## 2018-12-21 RX ADMIN — VANCOMYCIN HYDROCHLORIDE SCH MLS/HR: 1 INJECTION, POWDER, LYOPHILIZED, FOR SOLUTION INTRAVENOUS at 10:00

## 2018-12-21 RX ADMIN — TRAZODONE HYDROCHLORIDE PRN MG: 50 TABLET ORAL at 20:54

## 2018-12-21 RX ADMIN — SODIUM CHLORIDE, SODIUM LACTATE, POTASSIUM CHLORIDE, AND CALCIUM CHLORIDE SCH MLS/HR: .6; .31; .03; .02 INJECTION, SOLUTION INTRAVENOUS at 19:29

## 2018-12-21 RX ADMIN — CEFAZOLIN SODIUM SCH MLS/HR: 1 SOLUTION INTRAVENOUS at 20:50

## 2018-12-21 RX ADMIN — KETOROLAC TROMETHAMINE PRN MG: 30 INJECTION, SOLUTION INTRAMUSCULAR at 19:29

## 2018-12-21 RX ADMIN — NICOTINE SCH PATCH: 14 PATCH, EXTENDED RELEASE TRANSDERMAL at 10:56

## 2018-12-21 RX ADMIN — SODIUM CHLORIDE, SODIUM LACTATE, POTASSIUM CHLORIDE, AND CALCIUM CHLORIDE SCH MLS/HR: .6; .31; .03; .02 INJECTION, SOLUTION INTRAVENOUS at 10:56

## 2018-12-21 RX ADMIN — CEFAZOLIN SODIUM SCH MLS/HR: 1 SOLUTION INTRAVENOUS at 12:29

## 2018-12-22 VITALS — SYSTOLIC BLOOD PRESSURE: 130 MMHG | DIASTOLIC BLOOD PRESSURE: 87 MMHG

## 2018-12-22 VITALS — DIASTOLIC BLOOD PRESSURE: 78 MMHG | SYSTOLIC BLOOD PRESSURE: 114 MMHG

## 2018-12-22 VITALS — SYSTOLIC BLOOD PRESSURE: 107 MMHG | DIASTOLIC BLOOD PRESSURE: 67 MMHG

## 2018-12-22 VITALS — SYSTOLIC BLOOD PRESSURE: 125 MMHG | DIASTOLIC BLOOD PRESSURE: 75 MMHG

## 2018-12-22 LAB
ALBUMIN SERPL-MCNC: 2.6 G/DL (ref 3.4–5)
ALP SERPL-CCNC: 61 U/L (ref 45–117)
ALT SERPL-CCNC: 45 U/L (ref 12–78)
ANION GAP SERPL CALC-SCNC: 7 MMOL/L (ref 5–15)
BASOPHILS # BLD AUTO: 0.05 X10^3/UL (ref 0–0.1)
BASOPHILS NFR BLD AUTO: 1 % (ref 0–1)
BILIRUB SERPL-MCNC: 1.4 MG/DL (ref 0.2–1)
CALCIUM SERPL-MCNC: 8.3 MG/DL (ref 8.5–10.1)
CHLORIDE SERPL-SCNC: 112 MMOL/L (ref 98–107)
CREAT SERPL-MCNC: 0.76 MG/DL (ref 0.55–1.02)
EOSINOPHIL # BLD AUTO: 0.2 X10^3/UL (ref 0–0.4)
EOSINOPHIL NFR BLD AUTO: 2 % (ref 1–7)
ERYTHROCYTE [DISTWIDTH] IN BLOOD BY AUTOMATED COUNT: 12.7 % (ref 9.6–15.2)
LYMPHOCYTES # BLD AUTO: 2.57 X10^3/UL (ref 1–3.4)
LYMPHOCYTES NFR BLD AUTO: 28 % (ref 22–44)
MCH RBC QN AUTO: 31.1 PG (ref 27–34.8)
MCHC RBC AUTO-ENTMCNC: 33.2 G/DL (ref 32.4–35.8)
MCV RBC AUTO: 93.6 FL (ref 80–100)
MD: NO
MONOCYTES # BLD AUTO: 0.54 X10^3/UL (ref 0.2–0.8)
MONOCYTES NFR BLD AUTO: 6 % (ref 2–9)
NEUTROPHILS # BLD AUTO: 5.85 X10^3/UL (ref 1.8–6.8)
NEUTROPHILS NFR BLD AUTO: 64 % (ref 42–75)
PLATELET # BLD AUTO: 228 X10^3/UL (ref 130–400)
PMV BLD AUTO: 7.3 FL (ref 7.4–10.4)
PROT SERPL-MCNC: 6.4 G/DL (ref 6.4–8.2)
RBC # BLD AUTO: 4.12 X10^6/UL (ref 3.82–5.3)

## 2018-12-22 RX ADMIN — GABAPENTIN PRN MG: 100 CAPSULE ORAL at 14:20

## 2018-12-22 RX ADMIN — CEFAZOLIN SODIUM SCH MLS/HR: 1 SOLUTION INTRAVENOUS at 04:43

## 2018-12-22 RX ADMIN — VANCOMYCIN HYDROCHLORIDE SCH MLS/HR: 1 INJECTION, POWDER, LYOPHILIZED, FOR SOLUTION INTRAVENOUS at 22:55

## 2018-12-22 RX ADMIN — NICOTINE SCH PATCH: 14 PATCH, EXTENDED RELEASE TRANSDERMAL at 10:20

## 2018-12-22 RX ADMIN — METHADONE HYDROCHLORIDE PRN MG: 10 CONCENTRATE ORAL at 08:01

## 2018-12-22 RX ADMIN — CEFAZOLIN SODIUM SCH MLS/HR: 1 SOLUTION INTRAVENOUS at 13:26

## 2018-12-22 RX ADMIN — KETOROLAC TROMETHAMINE PRN MG: 30 INJECTION, SOLUTION INTRAMUSCULAR at 10:21

## 2018-12-22 RX ADMIN — SODIUM CHLORIDE, SODIUM LACTATE, POTASSIUM CHLORIDE, AND CALCIUM CHLORIDE SCH MLS/HR: .6; .31; .03; .02 INJECTION, SOLUTION INTRAVENOUS at 04:46

## 2018-12-22 RX ADMIN — METHADONE HYDROCHLORIDE PRN MG: 10 CONCENTRATE ORAL at 22:57

## 2018-12-22 RX ADMIN — SODIUM CHLORIDE, SODIUM LACTATE, POTASSIUM CHLORIDE, AND CALCIUM CHLORIDE SCH MLS/HR: .6; .31; .03; .02 INJECTION, SOLUTION INTRAVENOUS at 20:52

## 2018-12-22 RX ADMIN — KETOROLAC TROMETHAMINE PRN MG: 30 INJECTION, SOLUTION INTRAMUSCULAR at 02:14

## 2018-12-22 RX ADMIN — VANCOMYCIN HYDROCHLORIDE SCH MLS/HR: 1 INJECTION, POWDER, LYOPHILIZED, FOR SOLUTION INTRAVENOUS at 10:21

## 2018-12-22 RX ADMIN — CEFAZOLIN SODIUM SCH MLS/HR: 1 SOLUTION INTRAVENOUS at 20:53

## 2018-12-22 RX ADMIN — SODIUM CHLORIDE, SODIUM LACTATE, POTASSIUM CHLORIDE, AND CALCIUM CHLORIDE SCH MLS/HR: .6; .31; .03; .02 INJECTION, SOLUTION INTRAVENOUS at 10:40

## 2018-12-22 RX ADMIN — GABAPENTIN PRN MG: 100 CAPSULE ORAL at 22:57

## 2018-12-23 VITALS — SYSTOLIC BLOOD PRESSURE: 114 MMHG | DIASTOLIC BLOOD PRESSURE: 67 MMHG

## 2018-12-23 VITALS — SYSTOLIC BLOOD PRESSURE: 112 MMHG | DIASTOLIC BLOOD PRESSURE: 82 MMHG

## 2018-12-23 VITALS — DIASTOLIC BLOOD PRESSURE: 73 MMHG | SYSTOLIC BLOOD PRESSURE: 115 MMHG

## 2018-12-23 VITALS — SYSTOLIC BLOOD PRESSURE: 117 MMHG | DIASTOLIC BLOOD PRESSURE: 69 MMHG

## 2018-12-23 LAB
ALBUMIN SERPL-MCNC: 2.4 G/DL (ref 3.4–5)
ANION GAP SERPL CALC-SCNC: 8 MMOL/L (ref 5–15)
BASOPHILS # BLD AUTO: 0.04 X10^3/UL (ref 0–0.1)
BASOPHILS NFR BLD AUTO: 1 % (ref 0–1)
CALCIUM SERPL-MCNC: 7.5 MG/DL (ref 8.5–10.1)
CHLORIDE SERPL-SCNC: 108 MMOL/L (ref 98–107)
CREAT SERPL-MCNC: 0.83 MG/DL (ref 0.55–1.02)
EOSINOPHIL # BLD AUTO: 0.25 X10^3/UL (ref 0–0.4)
EOSINOPHIL NFR BLD AUTO: 3 % (ref 1–7)
ERYTHROCYTE [DISTWIDTH] IN BLOOD BY AUTOMATED COUNT: 12.6 % (ref 9.6–15.2)
LYMPHOCYTES # BLD AUTO: 2.34 X10^3/UL (ref 1–3.4)
LYMPHOCYTES NFR BLD AUTO: 31 % (ref 22–44)
MCH RBC QN AUTO: 31.3 PG (ref 27–34.8)
MCHC RBC AUTO-ENTMCNC: 33.1 G/DL (ref 32.4–35.8)
MCV RBC AUTO: 94.5 FL (ref 80–100)
MD: NO
MONOCYTES # BLD AUTO: 0.47 X10^3/UL (ref 0.2–0.8)
MONOCYTES NFR BLD AUTO: 6 % (ref 2–9)
NEUTROPHILS # BLD AUTO: 4.34 X10^3/UL (ref 1.8–6.8)
NEUTROPHILS NFR BLD AUTO: 58 % (ref 42–75)
PLATELET # BLD AUTO: 245 X10^3/UL (ref 130–400)
PMV BLD AUTO: 7.2 FL (ref 7.4–10.4)
RBC # BLD AUTO: 3.48 X10^6/UL (ref 3.82–5.3)

## 2018-12-23 RX ADMIN — NICOTINE SCH PATCH: 14 PATCH, EXTENDED RELEASE TRANSDERMAL at 10:32

## 2018-12-23 RX ADMIN — METHADONE HYDROCHLORIDE PRN MG: 10 CONCENTRATE ORAL at 11:07

## 2018-12-23 RX ADMIN — METHADONE HYDROCHLORIDE PRN MG: 10 CONCENTRATE ORAL at 05:15

## 2018-12-23 RX ADMIN — SODIUM CHLORIDE, SODIUM LACTATE, POTASSIUM CHLORIDE, AND CALCIUM CHLORIDE SCH MLS/HR: .6; .31; .03; .02 INJECTION, SOLUTION INTRAVENOUS at 05:14

## 2018-12-23 RX ADMIN — CEFAZOLIN SODIUM SCH MLS/HR: 1 SOLUTION INTRAVENOUS at 13:46

## 2018-12-23 RX ADMIN — CEFAZOLIN SODIUM SCH MLS/HR: 1 SOLUTION INTRAVENOUS at 05:10

## 2018-12-23 RX ADMIN — VANCOMYCIN HYDROCHLORIDE SCH MLS/HR: 1 INJECTION, POWDER, LYOPHILIZED, FOR SOLUTION INTRAVENOUS at 10:32

## 2018-12-23 RX ADMIN — TRAZODONE HYDROCHLORIDE PRN MG: 50 TABLET ORAL at 00:22

## 2019-01-24 ENCOUNTER — HOSPITAL ENCOUNTER (EMERGENCY)
Dept: HOSPITAL 8 - ED | Age: 34
Discharge: HOME | End: 2019-01-24
Payer: SELF-PAY

## 2019-01-24 VITALS — BODY MASS INDEX: 35.89 KG/M2 | WEIGHT: 242.29 LBS | HEIGHT: 69 IN

## 2019-01-24 VITALS — DIASTOLIC BLOOD PRESSURE: 82 MMHG | SYSTOLIC BLOOD PRESSURE: 128 MMHG

## 2019-01-24 DIAGNOSIS — H60.503: ICD-10-CM

## 2019-01-24 DIAGNOSIS — Z72.9: ICD-10-CM

## 2019-01-24 DIAGNOSIS — H66.003: Primary | ICD-10-CM

## 2019-01-24 DIAGNOSIS — E11.9: ICD-10-CM

## 2019-01-24 PROCEDURE — 99283 EMERGENCY DEPT VISIT LOW MDM: CPT

## 2019-04-25 ENCOUNTER — HOSPITAL ENCOUNTER (INPATIENT)
Dept: HOSPITAL 8 - ED | Age: 34
LOS: 1 days | Discharge: LEFT BEFORE DISCHARGE | DRG: 550 | End: 2019-04-26
Attending: HOSPITALIST | Admitting: HOSPITALIST
Payer: SELF-PAY

## 2019-04-25 VITALS — DIASTOLIC BLOOD PRESSURE: 89 MMHG | SYSTOLIC BLOOD PRESSURE: 139 MMHG

## 2019-04-25 VITALS — DIASTOLIC BLOOD PRESSURE: 91 MMHG | SYSTOLIC BLOOD PRESSURE: 140 MMHG

## 2019-04-25 VITALS — HEIGHT: 69 IN | BODY MASS INDEX: 35.4 KG/M2 | WEIGHT: 238.98 LBS

## 2019-04-25 DIAGNOSIS — M19.90: ICD-10-CM

## 2019-04-25 DIAGNOSIS — B19.20: ICD-10-CM

## 2019-04-25 DIAGNOSIS — Z72.0: ICD-10-CM

## 2019-04-25 DIAGNOSIS — F11.10: ICD-10-CM

## 2019-04-25 DIAGNOSIS — M25.462: ICD-10-CM

## 2019-04-25 DIAGNOSIS — M00.9: Primary | ICD-10-CM

## 2019-04-25 DIAGNOSIS — Z82.49: ICD-10-CM

## 2019-04-25 DIAGNOSIS — Z53.21: ICD-10-CM

## 2019-04-25 DIAGNOSIS — D72.829: ICD-10-CM

## 2019-04-25 DIAGNOSIS — E11.9: ICD-10-CM

## 2019-04-25 LAB
ALBUMIN SERPL-MCNC: 3.3 G/DL (ref 3.4–5)
ANION GAP SERPL CALC-SCNC: 6 MMOL/L (ref 5–15)
BASOPHILS # BLD AUTO: 0.06 X10^3/UL (ref 0–0.1)
BASOPHILS NFR BLD AUTO: 1 % (ref 0–1)
CALCIUM SERPL-MCNC: 8.6 MG/DL (ref 8.5–10.1)
CHLORIDE SERPL-SCNC: 105 MMOL/L (ref 98–107)
CREAT SERPL-MCNC: 0.72 MG/DL (ref 0.55–1.02)
CULTURE INDICATED?: NO
EOSINOPHIL # BLD AUTO: 0.16 X10^3/UL (ref 0–0.4)
EOSINOPHIL NFR BLD AUTO: 2 % (ref 1–7)
ERYTHROCYTE [DISTWIDTH] IN BLOOD BY AUTOMATED COUNT: 13.9 % (ref 9.6–15.2)
ERYTHROCYTE [SEDIMENTATION RATE] IN BLOOD BY WESTERGREN METHOD: 37 MM/HR (ref 0–20)
HCT (SEDRATE): 40.8 % (ref 34.6–47.8)
L PNEUMO AB SER IA-ACNC: 5.2 MG/DL (ref 0.02–0.3)
LYMPHOCYTES # BLD AUTO: 1.58 X10^3/UL (ref 1–3.4)
LYMPHOCYTES NFR BLD AUTO: 15 % (ref 22–44)
MCH RBC QN AUTO: 30 PG (ref 27–34.8)
MCHC RBC AUTO-ENTMCNC: 32.8 G/DL (ref 32.4–35.8)
MCV RBC AUTO: 91.4 FL (ref 80–100)
MD: NO
MICROSCOPIC: (no result)
MONOCYTES # BLD AUTO: 0.69 X10^3/UL (ref 0.2–0.8)
MONOCYTES NFR BLD AUTO: 7 % (ref 2–9)
NEUTROPHILS # BLD AUTO: 8.03 X10^3/UL (ref 1.8–6.8)
NEUTROPHILS NFR BLD AUTO: 76 % (ref 42–75)
PLATELET # BLD AUTO: 260 X10^3/UL (ref 130–400)
PMV BLD AUTO: 7.4 FL (ref 7.4–10.4)
RBC # BLD AUTO: 4.46 X10^6/UL (ref 3.82–5.3)

## 2019-04-25 PROCEDURE — 86141 C-REACTIVE PROTEIN HS: CPT

## 2019-04-25 PROCEDURE — 81003 URINALYSIS AUTO W/O SCOPE: CPT

## 2019-04-25 PROCEDURE — 87205 SMEAR GRAM STAIN: CPT

## 2019-04-25 PROCEDURE — 84157 ASSAY OF PROTEIN OTHER: CPT

## 2019-04-25 PROCEDURE — 36415 COLL VENOUS BLD VENIPUNCTURE: CPT

## 2019-04-25 PROCEDURE — 89051 BODY FLUID CELL COUNT: CPT

## 2019-04-25 PROCEDURE — 87040 BLOOD CULTURE FOR BACTERIA: CPT

## 2019-04-25 PROCEDURE — 84560 ASSAY OF URINE/URIC ACID: CPT

## 2019-04-25 PROCEDURE — 80048 BASIC METABOLIC PNL TOTAL CA: CPT

## 2019-04-25 PROCEDURE — 83615 LACTATE (LD) (LDH) ENZYME: CPT

## 2019-04-25 PROCEDURE — 85651 RBC SED RATE NONAUTOMATED: CPT

## 2019-04-25 PROCEDURE — 0S9D3ZZ DRAINAGE OF LEFT KNEE JOINT, PERCUTANEOUS APPROACH: ICD-10-PCS | Performed by: EMERGENCY MEDICINE

## 2019-04-25 PROCEDURE — 85025 COMPLETE CBC W/AUTO DIFF WBC: CPT

## 2019-04-25 PROCEDURE — 80307 DRUG TEST PRSMV CHEM ANLYZR: CPT

## 2019-04-25 PROCEDURE — 87070 CULTURE OTHR SPECIMN AEROBIC: CPT

## 2019-04-25 PROCEDURE — 82040 ASSAY OF SERUM ALBUMIN: CPT

## 2019-04-25 PROCEDURE — 82945 GLUCOSE OTHER FLUID: CPT

## 2019-04-25 RX ADMIN — CEFTAZIDIME SCH MLS/HR: 2 INJECTION, SOLUTION INTRAVENOUS at 15:07

## 2019-04-25 RX ADMIN — OXYCODONE HYDROCHLORIDE AND ACETAMINOPHEN PRN TAB: 5; 325 TABLET ORAL at 19:59

## 2019-04-25 RX ADMIN — VANCOMYCIN HYDROCHLORIDE SCH MLS/HR: 1 INJECTION, POWDER, LYOPHILIZED, FOR SOLUTION INTRAVENOUS at 16:19

## 2019-04-25 RX ADMIN — KETOROLAC TROMETHAMINE PRN MG: 30 INJECTION, SOLUTION INTRAMUSCULAR at 16:19

## 2019-04-25 RX ADMIN — KETOROLAC TROMETHAMINE PRN MG: 30 INJECTION, SOLUTION INTRAMUSCULAR at 22:36

## 2019-04-25 RX ADMIN — NICOTINE SCH PATCH: 14 PATCH, EXTENDED RELEASE TRANSDERMAL at 16:20

## 2019-04-25 NOTE — NUR
PER DR. UMÑIZ, WAITING ON CONSULT FROM DR. BURTON WHO IS IN SURGERY TO DECIDE 
IF PT NEEDS TO BE ADMITTED.

## 2019-04-25 NOTE — NUR
bedside report from Stephanie PASCUAL, pt care assumed at this time. Pt in bed, MD Gastelum has assessed pt, awaiting orders. WCTM.

## 2019-04-25 NOTE — NUR
TASK RN: FIRST CONTACT WITH PT. PT RESTING ON GURNEY. SLEEPING INTERMITTENTLY. 
NO ACUTE DISTRESS NOTED. RESPS EQUAL AND UNLABORED. WILL CONTINUE TO MONITOR.

## 2019-04-25 NOTE — NUR
PT TAKEN TO RESTROOM VIA WHEELCHAIR. PT ABLE TO TRANSFER EASILY FROM GURNEY TO 
WHEELCHAIR WITHOUT ASSIST. VSS.

## 2019-04-25 NOTE — NUR
PT REFUSING BLOOD AT THIS TIME, PT CRYING HYSTERICALLY STATING THAT SHE "CAN'T 
HANDLE THIS (KNEE) PAIN. I JUST WANT SOMEONE TO KNOCK ME OUT". MD NOTIFIED, SEE 
MAR FOR INTERVENTION, RN TO GIVE PT ICE PACK FOR KNEE.

## 2019-04-25 NOTE — NUR
ALL PT BELONGINGS THAT PT BROUGHT WITH HER WERE TAKEN WITH HER FOR TRANSPORT TO 
THE FLOOR INCLUDING EAR RINGS THAT WERE LEFT IN THE ROOM ON THE COUNTER. EAR 
RINGS WALKED UP TO PT'S ROOM BY EMT.

## 2019-04-26 VITALS — DIASTOLIC BLOOD PRESSURE: 96 MMHG | SYSTOLIC BLOOD PRESSURE: 139 MMHG

## 2019-04-26 LAB
ANION GAP SERPL CALC-SCNC: 8 MMOL/L (ref 5–15)
BASOPHILS # BLD AUTO: 0.01 X10^3/UL (ref 0–0.1)
BASOPHILS NFR BLD AUTO: 0 % (ref 0–1)
CALCIUM SERPL-MCNC: 9.3 MG/DL (ref 8.5–10.1)
CHLORIDE SERPL-SCNC: 109 MMOL/L (ref 98–107)
CREAT SERPL-MCNC: 0.66 MG/DL (ref 0.55–1.02)
EOSINOPHIL # BLD AUTO: 0.02 X10^3/UL (ref 0–0.4)
EOSINOPHIL NFR BLD AUTO: 0 % (ref 1–7)
ERYTHROCYTE [DISTWIDTH] IN BLOOD BY AUTOMATED COUNT: 13.4 % (ref 9.6–15.2)
LYMPHOCYTES # BLD AUTO: 1.35 X10^3/UL (ref 1–3.4)
LYMPHOCYTES NFR BLD AUTO: 13 % (ref 22–44)
MCH RBC QN AUTO: 31 PG (ref 27–34.8)
MCHC RBC AUTO-ENTMCNC: 33.9 G/DL (ref 32.4–35.8)
MCV RBC AUTO: 91.4 FL (ref 80–100)
MD: NO
MONOCYTES # BLD AUTO: 0.56 X10^3/UL (ref 0.2–0.8)
MONOCYTES NFR BLD AUTO: 5 % (ref 2–9)
NEUTROPHILS # BLD AUTO: 8.48 X10^3/UL (ref 1.8–6.8)
NEUTROPHILS NFR BLD AUTO: 81 % (ref 42–75)
PLATELET # BLD AUTO: 265 X10^3/UL (ref 130–400)
PMV BLD AUTO: 7.6 FL (ref 7.4–10.4)
RBC # BLD AUTO: 4.31 X10^6/UL (ref 3.82–5.3)

## 2019-04-26 RX ADMIN — VANCOMYCIN HYDROCHLORIDE SCH MLS/HR: 1 INJECTION, POWDER, LYOPHILIZED, FOR SOLUTION INTRAVENOUS at 16:28

## 2019-04-26 RX ADMIN — VANCOMYCIN HYDROCHLORIDE SCH MLS/HR: 1 INJECTION, POWDER, LYOPHILIZED, FOR SOLUTION INTRAVENOUS at 04:06

## 2019-04-26 RX ADMIN — OXYCODONE HYDROCHLORIDE AND ACETAMINOPHEN PRN TAB: 5; 325 TABLET ORAL at 14:30

## 2019-04-26 RX ADMIN — CEFTAZIDIME SCH MLS/HR: 2 INJECTION, SOLUTION INTRAVENOUS at 03:18

## 2019-04-26 RX ADMIN — NICOTINE SCH PATCH: 14 PATCH, EXTENDED RELEASE TRANSDERMAL at 13:29

## 2020-03-09 ENCOUNTER — HOSPITAL ENCOUNTER (OUTPATIENT)
Facility: MEDICAL CENTER | Age: 35
End: 2020-03-09
Attending: NURSE PRACTITIONER
Payer: COMMERCIAL

## 2020-03-09 ENCOUNTER — OFFICE VISIT (OUTPATIENT)
Dept: URGENT CARE | Facility: CLINIC | Age: 35
End: 2020-03-09
Payer: COMMERCIAL

## 2020-03-09 VITALS
HEART RATE: 85 BPM | OXYGEN SATURATION: 97 % | HEIGHT: 68 IN | RESPIRATION RATE: 14 BRPM | SYSTOLIC BLOOD PRESSURE: 108 MMHG | BODY MASS INDEX: 34.49 KG/M2 | DIASTOLIC BLOOD PRESSURE: 78 MMHG | WEIGHT: 227.6 LBS | TEMPERATURE: 97.2 F

## 2020-03-09 DIAGNOSIS — R10.2 VAGINAL PAIN: ICD-10-CM

## 2020-03-09 DIAGNOSIS — R30.0 DYSURIA: ICD-10-CM

## 2020-03-09 DIAGNOSIS — Z20.2 POSSIBLE EXPOSURE TO STD: ICD-10-CM

## 2020-03-09 LAB
APPEARANCE UR: NORMAL
BILIRUB UR STRIP-MCNC: NEGATIVE MG/DL
COLOR UR AUTO: NORMAL
GLUCOSE UR STRIP.AUTO-MCNC: NEGATIVE MG/DL
KETONES UR STRIP.AUTO-MCNC: NEGATIVE MG/DL
LEUKOCYTE ESTERASE UR QL STRIP.AUTO: NORMAL
NITRITE UR QL STRIP.AUTO: NEGATIVE
PH UR STRIP.AUTO: 5 [PH] (ref 5–8)
PROT UR QL STRIP: NEGATIVE MG/DL
RBC UR QL AUTO: NEGATIVE
SP GR UR STRIP.AUTO: 1.03
UROBILINOGEN UR STRIP-MCNC: 1 MG/DL

## 2020-03-09 PROCEDURE — 87480 CANDIDA DNA DIR PROBE: CPT

## 2020-03-09 PROCEDURE — 99214 OFFICE O/P EST MOD 30 MIN: CPT | Mod: 25 | Performed by: NURSE PRACTITIONER

## 2020-03-09 PROCEDURE — 87529 HSV DNA AMP PROBE: CPT

## 2020-03-09 PROCEDURE — 87491 CHLMYD TRACH DNA AMP PROBE: CPT

## 2020-03-09 PROCEDURE — 81002 URINALYSIS NONAUTO W/O SCOPE: CPT | Performed by: NURSE PRACTITIONER

## 2020-03-09 PROCEDURE — 87591 N.GONORRHOEAE DNA AMP PROB: CPT

## 2020-03-09 PROCEDURE — 87660 TRICHOMONAS VAGIN DIR PROBE: CPT

## 2020-03-09 PROCEDURE — 87086 URINE CULTURE/COLONY COUNT: CPT

## 2020-03-09 PROCEDURE — 87510 GARDNER VAG DNA DIR PROBE: CPT

## 2020-03-09 RX ORDER — AZITHROMYCIN 500 MG/1
1000 TABLET, FILM COATED ORAL ONCE
Qty: 2 TAB | Refills: 0 | Status: SHIPPED | OUTPATIENT
Start: 2020-03-09 | End: 2020-03-09

## 2020-03-09 ASSESSMENT — ENCOUNTER SYMPTOMS
FEVER: 0
MYALGIAS: 0
VOMITING: 0
FLANK PAIN: 0
CHILLS: 0
NAUSEA: 0
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
## 2020-03-10 LAB
C TRACH DNA SPEC QL NAA+PROBE: NEGATIVE
CANDIDA DNA VAG QL PROBE+SIG AMP: NEGATIVE
G VAGINALIS DNA VAG QL PROBE+SIG AMP: NEGATIVE
HSV1 DNA SPEC QL NAA+PROBE: NEGATIVE
HSV2 DNA SPEC QL NAA+PROBE: NEGATIVE
N GONORRHOEA DNA SPEC QL NAA+PROBE: POSITIVE
SPECIMEN SOURCE: ABNORMAL
SPECIMEN SOURCE: NORMAL
T VAGINALIS DNA VAG QL PROBE+SIG AMP: NEGATIVE

## 2020-03-11 ENCOUNTER — TELEPHONE (OUTPATIENT)
Dept: URGENT CARE | Facility: CLINIC | Age: 35
End: 2020-03-11

## 2020-03-12 LAB
BACTERIA UR CULT: NORMAL
SIGNIFICANT IND 70042: NORMAL
SITE SITE: NORMAL
SOURCE SOURCE: NORMAL

## 2020-03-22 ENCOUNTER — HOSPITAL ENCOUNTER (INPATIENT)
Facility: MEDICAL CENTER | Age: 35
LOS: 5 days | DRG: 550 | End: 2020-03-28
Attending: EMERGENCY MEDICINE | Admitting: INTERNAL MEDICINE
Payer: COMMERCIAL

## 2020-03-22 DIAGNOSIS — M00.9 PYOGENIC ARTHRITIS OF RIGHT KNEE JOINT, DUE TO UNSPECIFIED ORGANISM (HCC): ICD-10-CM

## 2020-03-22 DIAGNOSIS — M00.861 BACTERIAL ARTHRITIS OF RIGHT KNEE (HCC): ICD-10-CM

## 2020-03-22 LAB
GLUCOSE FLD-MCNC: 67 MG/DL
GRAM STN SPEC: NORMAL
SIGNIFICANT IND 70042: NORMAL
SITE SITE: NORMAL
SOURCE SOURCE: NORMAL

## 2020-03-22 PROCEDURE — 99285 EMERGENCY DEPT VISIT HI MDM: CPT

## 2020-03-22 PROCEDURE — 87070 CULTURE OTHR SPECIMN AEROBIC: CPT

## 2020-03-22 PROCEDURE — 700101 HCHG RX REV CODE 250: Performed by: EMERGENCY MEDICINE

## 2020-03-22 PROCEDURE — 84157 ASSAY OF PROTEIN OTHER: CPT

## 2020-03-22 PROCEDURE — 87205 SMEAR GRAM STAIN: CPT

## 2020-03-22 PROCEDURE — 89060 EXAM SYNOVIAL FLUID CRYSTALS: CPT

## 2020-03-22 PROCEDURE — 82945 GLUCOSE OTHER FLUID: CPT

## 2020-03-22 PROCEDURE — 89051 BODY FLUID CELL COUNT: CPT

## 2020-03-22 PROCEDURE — 20610 DRAIN/INJ JOINT/BURSA W/O US: CPT

## 2020-03-22 RX ORDER — LIDOCAINE HYDROCHLORIDE 10 MG/ML
20 INJECTION, SOLUTION INFILTRATION; PERINEURAL ONCE
Status: COMPLETED | OUTPATIENT
Start: 2020-03-22 | End: 2020-03-22

## 2020-03-22 RX ADMIN — LIDOCAINE HYDROCHLORIDE 20 ML: 10 INJECTION, SOLUTION INFILTRATION; PERINEURAL at 23:15

## 2020-03-22 ASSESSMENT — LIFESTYLE VARIABLES: DO YOU DRINK ALCOHOL: NO

## 2020-03-23 ENCOUNTER — PATIENT OUTREACH (OUTPATIENT)
Dept: HEALTH INFORMATION MANAGEMENT | Facility: OTHER | Age: 35
End: 2020-03-23

## 2020-03-23 ENCOUNTER — APPOINTMENT (OUTPATIENT)
Dept: RADIOLOGY | Facility: MEDICAL CENTER | Age: 35
DRG: 550 | End: 2020-03-23
Attending: PHYSICIAN ASSISTANT
Payer: COMMERCIAL

## 2020-03-23 PROBLEM — F19.90 IVDU (INTRAVENOUS DRUG USER): Status: ACTIVE | Noted: 2020-03-23

## 2020-03-23 PROBLEM — M00.9 PYOGENIC ARTHRITIS OF RIGHT KNEE JOINT (HCC): Status: ACTIVE | Noted: 2020-03-23

## 2020-03-23 LAB
ALBUMIN SERPL BCP-MCNC: 3.8 G/DL (ref 3.2–4.9)
ALBUMIN/GLOB SERPL: 1 G/DL
ALP SERPL-CCNC: 76 U/L (ref 30–99)
ALT SERPL-CCNC: 7 U/L (ref 2–50)
ANION GAP SERPL CALC-SCNC: 13 MMOL/L (ref 7–16)
APPEARANCE FLD: NORMAL
AST SERPL-CCNC: 10 U/L (ref 12–45)
BASOPHILS # BLD AUTO: 0.3 % (ref 0–1.8)
BASOPHILS # BLD: 0.03 K/UL (ref 0–0.12)
BILIRUB SERPL-MCNC: 2.4 MG/DL (ref 0.1–1.5)
BODY FLD TYPE: NORMAL
BODY FLD TYPE: NORMAL
BUN SERPL-MCNC: 10 MG/DL (ref 8–22)
CALCIUM SERPL-MCNC: 8.9 MG/DL (ref 8.5–10.5)
CHLORIDE SERPL-SCNC: 97 MMOL/L (ref 96–112)
CO2 SERPL-SCNC: 23 MMOL/L (ref 20–33)
COLOR FLD: YELLOW
CREAT SERPL-MCNC: 0.85 MG/DL (ref 0.5–1.4)
CRYSTALS FLD MICRO: NORMAL
CSF COMMENTS 1658: NORMAL
EOSINOPHIL # BLD AUTO: 0.01 K/UL (ref 0–0.51)
EOSINOPHIL NFR BLD: 0.1 % (ref 0–6.9)
ERYTHROCYTE [DISTWIDTH] IN BLOOD BY AUTOMATED COUNT: 44.5 FL (ref 35.9–50)
GLOBULIN SER CALC-MCNC: 3.9 G/DL (ref 1.9–3.5)
GLUCOSE SERPL-MCNC: 109 MG/DL (ref 65–99)
HCT VFR BLD AUTO: 39.8 % (ref 37–47)
HCV AB SER QL: REACTIVE
HGB BLD-MCNC: 12.9 G/DL (ref 12–16)
HIV 1+2 AB+HIV1 P24 AG SERPL QL IA: NORMAL
IMM GRANULOCYTES # BLD AUTO: 0.04 K/UL (ref 0–0.11)
IMM GRANULOCYTES NFR BLD AUTO: 0.4 % (ref 0–0.9)
INR PPP: 1.19 (ref 0.87–1.13)
LACTATE BLD-SCNC: 1 MMOL/L (ref 0.5–2)
LACTATE BLD-SCNC: 1.7 MMOL/L (ref 0.5–2)
LYMPHOCYTES # BLD AUTO: 2.88 K/UL (ref 1–4.8)
LYMPHOCYTES NFR BLD: 27.2 % (ref 22–41)
LYMPHOCYTES NFR FLD: 4 %
MCH RBC QN AUTO: 29.5 PG (ref 27–33)
MCHC RBC AUTO-ENTMCNC: 32.4 G/DL (ref 33.6–35)
MCV RBC AUTO: 91.1 FL (ref 81.4–97.8)
MONOCYTES # BLD AUTO: 0.83 K/UL (ref 0–0.85)
MONOCYTES NFR BLD AUTO: 7.8 % (ref 0–13.4)
MONONUC CELLS NFR FLD: 10 %
NEUTROPHILS # BLD AUTO: 6.8 K/UL (ref 2–7.15)
NEUTROPHILS NFR BLD: 64.2 % (ref 44–72)
NEUTROPHILS NFR FLD: 86 %
NRBC # BLD AUTO: 0 K/UL
NRBC BLD-RTO: 0 /100 WBC
PLATELET # BLD AUTO: 233 K/UL (ref 164–446)
PMV BLD AUTO: 9 FL (ref 9–12.9)
POTASSIUM SERPL-SCNC: 3.8 MMOL/L (ref 3.6–5.5)
PROT FLD-MCNC: 5.2 G/DL
PROT SERPL-MCNC: 7.7 G/DL (ref 6–8.2)
PROTHROMBIN TIME: 15.4 SEC (ref 12–14.6)
RBC # BLD AUTO: 4.37 M/UL (ref 4.2–5.4)
RBC # FLD: 5000 CELLS/UL
RPR SER QL: REACTIVE
RPR SER-TITR: NORMAL {TITER}
SODIUM SERPL-SCNC: 133 MMOL/L (ref 135–145)
TREPONEMA PALLIDUM IGG+IGM AB [PRESENCE] IN SERUM OR PLASMA BY IMMUNOASSAY: REACTIVE
WBC # BLD AUTO: 10.6 K/UL (ref 4.8–10.8)
WBC # FLD: NORMAL CELLS/UL

## 2020-03-23 PROCEDURE — 87077 CULTURE AEROBIC IDENTIFY: CPT

## 2020-03-23 PROCEDURE — 700105 HCHG RX REV CODE 258

## 2020-03-23 PROCEDURE — 36415 COLL VENOUS BLD VENIPUNCTURE: CPT

## 2020-03-23 PROCEDURE — 700102 HCHG RX REV CODE 250 W/ 637 OVERRIDE(OP): Performed by: HOSPITALIST

## 2020-03-23 PROCEDURE — A9270 NON-COVERED ITEM OR SERVICE: HCPCS | Performed by: INTERNAL MEDICINE

## 2020-03-23 PROCEDURE — 700102 HCHG RX REV CODE 250 W/ 637 OVERRIDE(OP): Performed by: INTERNAL MEDICINE

## 2020-03-23 PROCEDURE — 770006 HCHG ROOM/CARE - MED/SURG/GYN SEMI*

## 2020-03-23 PROCEDURE — 96365 THER/PROPH/DIAG IV INF INIT: CPT

## 2020-03-23 PROCEDURE — 700111 HCHG RX REV CODE 636 W/ 250 OVERRIDE (IP): Performed by: INTERNAL MEDICINE

## 2020-03-23 PROCEDURE — 99407 BEHAV CHNG SMOKING > 10 MIN: CPT | Performed by: INTERNAL MEDICINE

## 2020-03-23 PROCEDURE — 73562 X-RAY EXAM OF KNEE 3: CPT | Mod: RT

## 2020-03-23 PROCEDURE — A9270 NON-COVERED ITEM OR SERVICE: HCPCS | Performed by: HOSPITALIST

## 2020-03-23 PROCEDURE — 87491 CHLMYD TRACH DNA AMP PROBE: CPT

## 2020-03-23 PROCEDURE — 87591 N.GONORRHOEAE DNA AMP PROB: CPT

## 2020-03-23 PROCEDURE — 85025 COMPLETE CBC W/AUTO DIFF WBC: CPT

## 2020-03-23 PROCEDURE — 0S9C3ZX DRAINAGE OF RIGHT KNEE JOINT, PERCUTANEOUS APPROACH, DIAGNOSTIC: ICD-10-PCS | Performed by: EMERGENCY MEDICINE

## 2020-03-23 PROCEDURE — 700105 HCHG RX REV CODE 258: Performed by: INTERNAL MEDICINE

## 2020-03-23 PROCEDURE — 87186 SC STD MICRODIL/AGAR DIL: CPT

## 2020-03-23 PROCEDURE — 85610 PROTHROMBIN TIME: CPT

## 2020-03-23 PROCEDURE — 83605 ASSAY OF LACTIC ACID: CPT

## 2020-03-23 PROCEDURE — 87040 BLOOD CULTURE FOR BACTERIA: CPT | Mod: 91

## 2020-03-23 PROCEDURE — 99255 IP/OBS CONSLTJ NEW/EST HI 80: CPT | Performed by: INTERNAL MEDICINE

## 2020-03-23 PROCEDURE — 86593 SYPHILIS TEST NON-TREP QUANT: CPT

## 2020-03-23 PROCEDURE — 86803 HEPATITIS C AB TEST: CPT

## 2020-03-23 PROCEDURE — 80053 COMPREHEN METABOLIC PANEL: CPT

## 2020-03-23 PROCEDURE — 81025 URINE PREGNANCY TEST: CPT

## 2020-03-23 PROCEDURE — 86780 TREPONEMA PALLIDUM: CPT

## 2020-03-23 PROCEDURE — 86592 SYPHILIS TEST NON-TREP QUAL: CPT

## 2020-03-23 PROCEDURE — 87389 HIV-1 AG W/HIV-1&-2 AB AG IA: CPT

## 2020-03-23 PROCEDURE — 99223 1ST HOSP IP/OBS HIGH 75: CPT | Mod: 25 | Performed by: INTERNAL MEDICINE

## 2020-03-23 RX ORDER — POLYETHYLENE GLYCOL 3350 17 G/17G
1 POWDER, FOR SOLUTION ORAL
Status: DISCONTINUED | OUTPATIENT
Start: 2020-03-23 | End: 2020-03-28 | Stop reason: HOSPADM

## 2020-03-23 RX ORDER — SODIUM CHLORIDE 9 MG/ML
INJECTION, SOLUTION INTRAVENOUS
Status: COMPLETED
Start: 2020-03-23 | End: 2020-03-23

## 2020-03-23 RX ORDER — IBUPROFEN 600 MG/1
600 TABLET ORAL
Status: DISCONTINUED | OUTPATIENT
Start: 2020-03-23 | End: 2020-03-28 | Stop reason: HOSPADM

## 2020-03-23 RX ORDER — AZITHROMYCIN 250 MG/1
1000 TABLET, FILM COATED ORAL ONCE
Status: COMPLETED | OUTPATIENT
Start: 2020-03-23 | End: 2020-03-23

## 2020-03-23 RX ORDER — MORPHINE SULFATE 4 MG/ML
4 INJECTION, SOLUTION INTRAMUSCULAR; INTRAVENOUS
Status: DISCONTINUED | OUTPATIENT
Start: 2020-03-23 | End: 2020-03-28 | Stop reason: HOSPADM

## 2020-03-23 RX ORDER — SODIUM CHLORIDE, SODIUM LACTATE, POTASSIUM CHLORIDE, AND CALCIUM CHLORIDE .6; .31; .03; .02 G/100ML; G/100ML; G/100ML; G/100ML
1000 INJECTION, SOLUTION INTRAVENOUS
Status: DISCONTINUED | OUTPATIENT
Start: 2020-03-23 | End: 2020-03-28 | Stop reason: HOSPADM

## 2020-03-23 RX ORDER — OXYCODONE HYDROCHLORIDE 10 MG/1
10 TABLET ORAL
Status: DISCONTINUED | OUTPATIENT
Start: 2020-03-23 | End: 2020-03-28 | Stop reason: HOSPADM

## 2020-03-23 RX ORDER — LOPERAMIDE HYDROCHLORIDE 2 MG/1
2 CAPSULE ORAL 4 TIMES DAILY PRN
Status: DISCONTINUED | OUTPATIENT
Start: 2020-03-23 | End: 2020-03-28 | Stop reason: HOSPADM

## 2020-03-23 RX ORDER — BISACODYL 10 MG
10 SUPPOSITORY, RECTAL RECTAL
Status: DISCONTINUED | OUTPATIENT
Start: 2020-03-23 | End: 2020-03-28 | Stop reason: HOSPADM

## 2020-03-23 RX ORDER — AMOXICILLIN 250 MG
2 CAPSULE ORAL 2 TIMES DAILY
Status: DISCONTINUED | OUTPATIENT
Start: 2020-03-23 | End: 2020-03-23

## 2020-03-23 RX ORDER — METHADONE HYDROCHLORIDE 10 MG/1
20 TABLET ORAL DAILY
Status: DISCONTINUED | OUTPATIENT
Start: 2020-03-23 | End: 2020-03-26

## 2020-03-23 RX ORDER — HYDROXYZINE HYDROCHLORIDE 10 MG/1
10 TABLET, FILM COATED ORAL 3 TIMES DAILY PRN
Status: DISCONTINUED | OUTPATIENT
Start: 2020-03-23 | End: 2020-03-28 | Stop reason: HOSPADM

## 2020-03-23 RX ORDER — OXYCODONE HYDROCHLORIDE 5 MG/1
5 TABLET ORAL
Status: DISCONTINUED | OUTPATIENT
Start: 2020-03-23 | End: 2020-03-28 | Stop reason: HOSPADM

## 2020-03-23 RX ORDER — CLONIDINE 0.1 MG/24H
1 PATCH, EXTENDED RELEASE TRANSDERMAL
Status: DISCONTINUED | OUTPATIENT
Start: 2020-03-23 | End: 2020-03-27

## 2020-03-23 RX ORDER — ACETAMINOPHEN 325 MG/1
650 TABLET ORAL EVERY 6 HOURS PRN
Status: DISCONTINUED | OUTPATIENT
Start: 2020-03-23 | End: 2020-03-28 | Stop reason: HOSPADM

## 2020-03-23 RX ADMIN — METHADONE HYDROCHLORIDE 20 MG: 10 TABLET ORAL at 11:31

## 2020-03-23 RX ADMIN — AZITHROMYCIN MONOHYDRATE 1000 MG: 250 TABLET ORAL at 15:51

## 2020-03-23 RX ADMIN — CLONIDINE 1 PATCH: 0.1 PATCH TRANSDERMAL at 11:31

## 2020-03-23 RX ADMIN — ACETAMINOPHEN 650 MG: 325 TABLET, FILM COATED ORAL at 03:37

## 2020-03-23 RX ADMIN — OXYCODONE HYDROCHLORIDE 10 MG: 10 TABLET ORAL at 15:52

## 2020-03-23 RX ADMIN — CEFTRIAXONE SODIUM 2 G: 2 INJECTION, POWDER, FOR SOLUTION INTRAMUSCULAR; INTRAVENOUS at 01:46

## 2020-03-23 RX ADMIN — IBUPROFEN 600 MG: 600 TABLET ORAL at 11:31

## 2020-03-23 RX ADMIN — IBUPROFEN 600 MG: 600 TABLET ORAL at 18:21

## 2020-03-23 RX ADMIN — SENNOSIDES AND DOCUSATE SODIUM 2 TABLET: 8.6; 5 TABLET ORAL at 03:37

## 2020-03-23 RX ADMIN — OXYCODONE HYDROCHLORIDE 10 MG: 10 TABLET ORAL at 20:56

## 2020-03-23 RX ADMIN — VANCOMYCIN HYDROCHLORIDE 2700 MG: 500 INJECTION, POWDER, LYOPHILIZED, FOR SOLUTION INTRAVENOUS at 03:33

## 2020-03-23 RX ADMIN — OXYCODONE HYDROCHLORIDE 10 MG: 10 TABLET ORAL at 09:44

## 2020-03-23 RX ADMIN — VANCOMYCIN HYDROCHLORIDE 1300 MG: 500 INJECTION, POWDER, LYOPHILIZED, FOR SOLUTION INTRAVENOUS at 18:21

## 2020-03-23 RX ADMIN — SODIUM CHLORIDE 500 ML: 9 INJECTION, SOLUTION INTRAVENOUS at 03:33

## 2020-03-23 RX ADMIN — VANCOMYCIN HYDROCHLORIDE 1300 MG: 500 INJECTION, POWDER, LYOPHILIZED, FOR SOLUTION INTRAVENOUS at 09:51

## 2020-03-23 RX ADMIN — OXYCODONE HYDROCHLORIDE 10 MG: 10 TABLET ORAL at 02:30

## 2020-03-23 RX ADMIN — SODIUM CHLORIDE 500 ML: 9 INJECTION, SOLUTION INTRAVENOUS at 20:56

## 2020-03-23 ASSESSMENT — LIFESTYLE VARIABLES
ALCOHOL_USE: NO
AVERAGE NUMBER OF DAYS PER WEEK YOU HAVE A DRINK CONTAINING ALCOHOL: 0
HOW MANY TIMES IN THE PAST YEAR HAVE YOU HAD 5 OR MORE DRINKS IN A DAY: 0
EVER_SMOKED: YES
CONSUMPTION TOTAL: NEGATIVE
HAVE YOU EVER FELT YOU SHOULD CUT DOWN ON YOUR DRINKING: NO
HAVE PEOPLE ANNOYED YOU BY CRITICIZING YOUR DRINKING: NO
TOTAL SCORE: 0
ON A TYPICAL DAY WHEN YOU DRINK ALCOHOL HOW MANY DRINKS DO YOU HAVE: 0
EVER_SMOKED: YES
EVER FELT BAD OR GUILTY ABOUT YOUR DRINKING: NO
TOTAL SCORE: 0
DOES PATIENT WANT TO STOP DRINKING: NO
TOTAL SCORE: 0
EVER HAD A DRINK FIRST THING IN THE MORNING TO STEADY YOUR NERVES TO GET RID OF A HANGOVER: NO

## 2020-03-23 ASSESSMENT — ENCOUNTER SYMPTOMS
BLURRED VISION: 0
FLANK PAIN: 0
MYALGIAS: 0
BRUISES/BLEEDS EASILY: 0
PALPITATIONS: 0
NAUSEA: 0
SPUTUM PRODUCTION: 0
NECK PAIN: 0
ABDOMINAL PAIN: 0
FOCAL WEAKNESS: 0
SEIZURES: 0
SHORTNESS OF BREATH: 0
FEVER: 0
BACK PAIN: 0
VOMITING: 0
DIAPHORESIS: 0
CHILLS: 0
SORE THROAT: 0
BLOOD IN STOOL: 0
HEADACHES: 0
DIARRHEA: 0
WHEEZING: 0
DIZZINESS: 0
COUGH: 0

## 2020-03-23 ASSESSMENT — COGNITIVE AND FUNCTIONAL STATUS - GENERAL
HELP NEEDED FOR BATHING: A LOT
STANDING UP FROM CHAIR USING ARMS: A LITTLE
TURNING FROM BACK TO SIDE WHILE IN FLAT BAD: A LITTLE
PERSONAL GROOMING: A LITTLE
DRESSING REGULAR UPPER BODY CLOTHING: A LITTLE
CLIMB 3 TO 5 STEPS WITH RAILING: A LOT
TOILETING: A LOT
MOVING FROM LYING ON BACK TO SITTING ON SIDE OF FLAT BED: A LOT
WALKING IN HOSPITAL ROOM: A LOT
SUGGESTED CMS G CODE MODIFIER DAILY ACTIVITY: CK
MOBILITY SCORE: 15
SUGGESTED CMS G CODE MODIFIER MOBILITY: CK
DRESSING REGULAR LOWER BODY CLOTHING: A LOT
MOVING TO AND FROM BED TO CHAIR: A LITTLE
DAILY ACTIVITIY SCORE: 16

## 2020-03-23 ASSESSMENT — COPD QUESTIONNAIRES
DURING THE PAST 4 WEEKS HOW MUCH DID YOU FEEL SHORT OF BREATH: NONE/LITTLE OF THE TIME
HAVE YOU SMOKED AT LEAST 100 CIGARETTES IN YOUR ENTIRE LIFE: YES
DO YOU EVER COUGH UP ANY MUCUS OR PHLEGM?: NO/ONLY WITH OCCASIONAL COLDS OR INFECTIONS
COPD SCREENING SCORE: 2

## 2020-03-23 ASSESSMENT — PATIENT HEALTH QUESTIONNAIRE - PHQ9
SUM OF ALL RESPONSES TO PHQ9 QUESTIONS 1 AND 2: 0
2. FEELING DOWN, DEPRESSED, IRRITABLE, OR HOPELESS: NOT AT ALL
1. LITTLE INTEREST OR PLEASURE IN DOING THINGS: NOT AT ALL

## 2020-03-23 ASSESSMENT — FIBROSIS 4 INDEX: FIB4 SCORE: 0.52

## 2020-03-23 NOTE — ASSESSMENT & PLAN NOTE
Status post arthrocentesis in the ER  Fluid cell count reveals WBC count of 58435 predominantly neutrophils which is suggestive of septic arthritis  Started on IV antibiotics  Synovial fluid from 3/24 Gram stain showed white blood cells, no organism  Status post arthroscopic drainage on 3/24  Pain control with Tylenol and oxycodone.  IV morphine for breakthrough pain  Follow-up with culture and sensitivity.  Urine gonorrhea/chlamydia screen negative  Synovial fluid culture negative

## 2020-03-23 NOTE — H&P
Hospital Medicine History & Physical Note    Date of Service  3/23/2020    Primary Care Physician  Pcp Pt States None    Consultants  None    Code Status  Full code    Chief Complaint  Right knee pain    History of Presenting Illness  34 y.o. female with a past medical history of IV drug use, tobacco abuse, who presented 3/22/2020 with right knee pain for the past 2 days.  The patient developed worsening pain and swelling of her right knee to the point where she was unable to bear weight or ambulate.  She denies any fevers, chills, chest pain or shortness of breath.  She denies any trauma to her right knee.  Reports injecting IV heroin in her legs.  She reports a history of septic arthritis of knee which had to be drained.    Review of Systems  Review of Systems   Constitutional: Negative for chills, diaphoresis and fever.   HENT: Negative for hearing loss and sore throat.    Eyes: Negative for blurred vision.   Respiratory: Negative for cough, sputum production, shortness of breath and wheezing.    Cardiovascular: Negative for chest pain, palpitations and leg swelling.   Gastrointestinal: Negative for abdominal pain, blood in stool, diarrhea, nausea and vomiting.   Genitourinary: Negative for dysuria, flank pain and urgency.   Musculoskeletal: Positive for joint pain. Negative for back pain, myalgias and neck pain.   Skin: Negative for rash.   Neurological: Negative for dizziness, focal weakness, seizures and headaches.   Endo/Heme/Allergies: Does not bruise/bleed easily.   Psychiatric/Behavioral: Negative for suicidal ideas.   All other systems reviewed and are negative.      Past Medical History   has a past medical history of Arthritis, ASTHMA, Back pain (6/21/2011), Headache(784.0) (7/25/2011), Migraines, Obesity, Pain (1/2012), PCOS (polycystic ovarian syndrome), Psychiatric disorder, and Type II or unspecified type diabetes mellitus without mention of complication, not stated as uncontrolled.    Surgical  History   has a past surgical history that includes pr  delivery only; repeat c section (2012); other; other (2014); tubal coagulation laparoscopic bilateral (2015); dilation and curettage (2015); and hysteroscopy novasure-2 (2015).     Family History  family history includes Alcohol/Drug in her father; Arthritis in her maternal grandmother; Heart Disease in her maternal grandfather; Hyperlipidemia in her maternal grandmother and mother; Non-contributory in her father and mother.     Social History   reports that she has been smoking cigarettes. She has a 18.00 pack-year smoking history. She has never used smokeless tobacco. She reports current drug use. Drug: Intravenous. She reports that she does not drink alcohol.    Allergies  No Known Allergies    Medications  None       Physical Exam  Temp:  [37.2 °C (99 °F)] 37.2 °C (99 °F)  Pulse:  [] 88  Resp:  [18-22] 22  BP: (119-141)/(66-97) 139/91  SpO2:  [93 %-99 %] 95 %    Physical Exam  Vitals signs and nursing note reviewed.   Constitutional:       General: She is not in acute distress.     Appearance: Normal appearance.   HENT:      Head: Normocephalic and atraumatic.      Nose: Nose normal.      Mouth/Throat:      Mouth: Mucous membranes are moist.   Eyes:      Extraocular Movements: Extraocular movements intact.      Conjunctiva/sclera: Conjunctivae normal.      Pupils: Pupils are equal, round, and reactive to light.   Neck:      Musculoskeletal: Normal range of motion and neck supple.   Cardiovascular:      Rate and Rhythm: Normal rate and regular rhythm.      Pulses: Normal pulses.      Heart sounds: Normal heart sounds.   Pulmonary:      Effort: No respiratory distress.      Breath sounds: No wheezing, rhonchi or rales.      Comments: Tachypneic  Abdominal:      General: Bowel sounds are normal. There is no distension.      Palpations: Abdomen is soft.      Tenderness: There is no abdominal tenderness.   Musculoskeletal:          General: Swelling and tenderness present.      Comments: Limited range of motion her right knee due to pain and swelling   Lymphadenopathy:      Cervical: No cervical adenopathy.   Skin:     General: Skin is warm.      Coloration: Skin is not jaundiced.      Findings: No rash.   Neurological:      General: No focal deficit present.      Mental Status: She is alert and oriented to person, place, and time.      Cranial Nerves: No cranial nerve deficit.      Motor: No weakness.   Psychiatric:         Mood and Affect: Mood normal.         Behavior: Behavior normal.         Laboratory:          No results for input(s): ALTSGPT, ASTSGOT, ALKPHOSPHAT, TBILIRUBIN, DBILIRUBIN, GAMMAGT, AMYLASE, LIPASE, ALB, PREALBUMIN, GLUCOSE in the last 72 hours.      No results for input(s): NTPROBNP in the last 72 hours.      No results for input(s): TROPONINT in the last 72 hours.    Urinalysis:    No results found     Imaging:  No orders to display         Assessment/Plan:  I anticipate this patient will require at least two midnights for appropriate medical management, necessitating inpatient admission.    Pyogenic arthritis of right knee joint (HCC)- (present on admission)  Assessment & Plan  Status post arthrocentesis in the ER  Fluid cell count reveals WBC count of 12127 predominantly neutrophils which is suggestive of septic arthritis  I have started the patient on IV vancomycin and IV ceftriaxone.  Monitor for vancomycin toxicity and follow trough levels  Follow blood and fluid culture  We will consider consulting orthopedics in the morning if she has persistent or worsening swelling  Pain control with Tylenol and oxycodone.  IV morphine for breakthrough pain      Obesity- (present on admission)  Assessment & Plan  Body mass index is 34.7 kg/m².  Patient has been counseled on diet and lifestyle modifications      IVDU (intravenous drug user)- (present on admission)  Assessment & Plan  Counseled on cessation    Tobacco use-  (present on admission)  Assessment & Plan  Tobacco cessation education provided for more than 11 minutes.  We discussed the risks of smoking including increased risk of heart disease, stroke, cancer and COPD. We discussed the benefits of quitting smoking. We discussed options of nicotine patch, wellbutrin and chantix.  The patient has the intention to quit smoking.         VTE prophylaxis: SCD

## 2020-03-23 NOTE — PROGRESS NOTES
No acute overnight events, patient seen and examined, full note to follow in morning.    Infectious disease consult obtained.  Assistance appreciated.

## 2020-03-23 NOTE — PROGRESS NOTES
2 RN Skin Check    2 RN skin check complete with kailey Turner RN. Scattered scabs, bruises, insertion marks all around body. Dirt/bruising noted on bilat feet. R knee aspiration site, cdi.  The following interventions in place Pillows.

## 2020-03-23 NOTE — ASSESSMENT & PLAN NOTE
Continue methadone; reduce dose to 10 mg twice daily 3/26 due to somnolence  Counseled on cessation

## 2020-03-23 NOTE — DISCHARGE PLANNING
Patient is eligible for Medicaid Meds to Beds at discharge if they have coverage with [place] Medicaid, Medicaid FFS, Medicaid HMO ([facility]), or [place]. This service is provided through the [facility] if orders are received prior to 4 p.m. Monday through Friday, excluding holidays. Please call [phone] prior to discharge.

## 2020-03-23 NOTE — ED NOTES
IV started and labs collected and sent. Lab at bedside for second set of cultures.    no action, waiting for dialysis

## 2020-03-23 NOTE — CARE PLAN
Problem: Infection  Goal: Will remain free from infection  Outcome: PROGRESSING AS EXPECTED    -IV abx started. Monitoring labs and VS.      Problem: Pain Management  Goal: Pain level will decrease to patient's comfort goal  Outcome: PROGRESSING AS EXPECTED    -Pain controlled with scheduled and PRN medications. Pt resting comfortably in bed. A/ox4 but slightly lethargic.

## 2020-03-23 NOTE — PROGRESS NOTES
"   Orthopaedic PA Progress Note    Interval changes:34F here for eval possible septic R knee. Dr. Paulino in OR, formal consult to follow  Tapped in ED 77K WBC, no orgs  XR pending, had lunch today    /72   Pulse 72   Temp 36.2 °C (97.2 °F) (Temporal)   Resp 18   Ht 1.753 m (5' 9\")   Wt 105 kg (231 lb 7.7 oz)   SpO2 96%     Patient seen and examined  No acute distress  Breathing non labored  RRR  Mottled skin, obese appearance bilat LEs, NVTI BLE    Recent Labs     03/23/20  0135   WBC 10.6   RBC 4.37   HEMOGLOBIN 12.9   HEMATOCRIT 39.8   MCV 91.1   MCH 29.5   MCHC 32.4*   RDW 44.5   PLATELETCT 233   MPV 9.0       Active Hospital Problems    Diagnosis   • Pyogenic arthritis of right knee joint (HCC) [M00.9]     Priority: High   • Obesity [E66.9]     Priority: Medium     Nutrition on  Needs outpatient weight loss     • Tobacco use [Z72.0]     Priority: Low     Nicotine replacement PRN  Strongly encouraged to quit  Counseled at least 8 minutes on 02/08/2016     • IVDU (intravenous drug user) [F19.90]       Assessment/Plan:  34F as above  XR Rt knee  Formal consult later this afternoon  Please keep NPO pending consult  "

## 2020-03-23 NOTE — CONSULTS
[facility] INFECTIOUS DISEASES INPATIENT CONSULT NOTE     Date of Service: 3/23/2020    Consult Requested By: Last Aguilar M.D.    Reason for Consultation: Knee septic arthritis    Chief Complaint: Knee pain    History of Present Illness:     Kat Devlin is a 34 y.o. female admitted 3/22/2020.  Patient has known IV heroin use, presented to the ER on 3/22 with acute onset of right knee pain that morning with swelling.  Patient states similar history in the past that required drainage.  She noted no injury to it.  Patient noted no fevers chills.  Patient does inject heroin into her legs.  Of note, patient was treated for gonorrhea on 3/9/2020.  Patient was afebrile in the ER, white count of 10.6.  Arthrocentesis was performed which showed 77,000 white cells, predominantly neutrophils, cultures pending, Gram stain negative.  She was admitted and started on IV vancomycin and ceftriaxone.    Patient states that after treatment of her gonorrhea on 3/9/2020 with 1 dose of IM ceftriaxone, her vaginal burning resolved.  Patient notes continued pain and swelling of the right knee.  She states that about 6 months ago, she had a similar presentation that required drainage at [place], does not remember if it was the same knee.  Patient states that she left AMA after that.    Patient does note multiple skin lesions, several of them are over sites where she had injected, but some of them especially over the ankles and arm are not over areas where patient has injected heroin.    Review of Systems:  All other systems reviewed and are negative expect as noted in HPI    Past Medical History:   Diagnosis Date   • Arthritis     knee and back   • ASTHMA     rarely. uses inhaler exercise inducted   • Back pain 6/21/2011   • Headache(784.0) 7/25/2011   • Migraines    • Obesity    • Pain 1/2012    bilat. feet 6/10   • PCOS (polycystic ovarian syndrome)    • Psychiatric disorder     depression and anxiety   • Type II or unspecified  type diabetes mellitus without mention of complication, not stated as uncontrolled     diet and oral meds       Past Surgical History:   Procedure Laterality Date   • TUBAL COAGULATION LAPAROSCOPIC BILATERAL  2015    Performed by Juan R Sutton M.D. at SURGERY SAME DAY [facility]   • DILATION AND CURETTAGE  2015    Performed by Juan R Sutton M.D. at SURGERY SAME DAY [facility]   • HYSTEROSCOPY NOVASURE-2  2015    Performed by Juan R Sutton M.D. at SURGERY SAME DAY [facility]   • OTHER  2014    oral surgery in office   • REPEAT C SECTION  2012    Performed by DESIREE TUCKER at LABOR AND DELIVERY   • OTHER      bilateral knee surgery   • [place]  DELIVERY ONLY         Family History   Problem Relation Age of Onset   • Non-contributory Mother    • Hyperlipidemia Mother    • Non-contributory Father    • Alcohol/Drug Father    • Heart Disease Maternal Grandfather    • Arthritis Maternal Grandmother    • Hyperlipidemia Maternal Grandmother        Social History     Socioeconomic History   • Marital status: Single     Spouse name: Not on file   • Number of children: Not on file   • Years of education: Not on file   • Highest education level: Not on file   Occupational History   • Not on file   Social Needs   • Financial resource strain: Not on file   • Food insecurity     Worry: Not on file     Inability: Not on file   • Transportation needs     Medical: Not on file     Non-medical: Not on file   Tobacco Use   • Smoking status: Current Every Day Smoker     Packs/day: 1.00     Years: 18.00     Pack years: 18.00     Types: Cigarettes   • Smokeless tobacco: Never Used   Substance and Sexual Activity   • Alcohol use: No   • Drug use: Yes     Types: Intravenous     Comment: Heroin; last use this morning.   • Sexual activity: Yes     Partners: Male   Lifestyle   • Physical activity     Days per week: Not on file     Minutes per session: Not on file   • Stress: Not on file   Relationships    • Social connections     Talks on phone: Not on file     Gets together: Not on file     Attends Jehovah's witness service: Not on file     Active member of club or organization: Not on file     Attends meetings of clubs or organizations: Not on file     Relationship status: Not on file   • Intimate partner violence     Fear of current or ex partner: Not on file     Emotionally abused: Not on file     Physically abused: Not on file     Forced sexual activity: Not on file   Other Topics Concern   • Not on file   Social History Narrative    ** Merged History Encounter **            No Known Allergies    Medications:    Current Facility-Administered Medications:   •  [DISCONTINUED] senna-docusate (PERICOLACE or SENOKOT S) 8.6-50 MG per tablet 2 Tab, 2 Tab, Oral, BID, Stopped at 03/23/20 0600 **AND** polyethylene glycol/lytes (MIRALAX) PACKET 1 Packet, 1 Packet, Oral, QDAY PRN **AND** magnesium hydroxide (MILK OF MAGNESIA) suspension 30 mL, 30 mL, Oral, QDAY PRN **AND** bisacodyl (DULCOLAX) suppository 10 mg, 10 mg, Rectal, QDAY PRN, Barry Ann M.D.  •  Respiratory Therapy Consult, , Nebulization, Continuous RT, Barry Ann M.D.  •  lactated ringers infusion (BOLUS), 1,000 mL, Intravenous, Once PRN, Barry Ann M.D.  •  acetaminophen (TYLENOL) tablet 650 mg, 650 mg, Oral, Q6HRS PRN, Barry Ann M.D., 650 mg at 03/23/20 0337  •  Notify provider if pain remains uncontrolled, , , CONTINUOUS **AND** Use the numeric rating scale (NRS-11) on regular floors and Critical-Care Pain Observation Tool (CPOT) on ICUs/Trauma to assess pain, , , CONTINUOUS **AND** Pulse Ox (Oximetry), , , CONTINUOUS **AND** Pharmacy Consult Request ...Pain Management Review 1 Each, 1 Each, Other, PHARMACY TO DOSE **AND** If patient difficult to arouse and/or has respiratory depression, stop any opiates that are currently infusing and call a Rapid Response., , , CONTINUOUS **AND** oxyCODONE immediate-release (ROXICODONE) tablet 5 mg, 5 mg, Oral, Q3HRS  "PRN **AND** oxyCODONE immediate-release (ROXICODONE) tablet 10 mg, 10 mg, Oral, Q3HRS PRN, 10 mg at 20 0944 **AND** morphine (pf) 4 MG/ML injection 4 mg, 4 mg, Intravenous, Q3HRS PRN, Barry Ann M.D.  •  MD Alert...Vancomycin per Pharmacy, , Other, PHARMACY TO DOSE, Barry Ann M.D.  •  cefTRIAXone (ROCEPHIN) 2 g in  mL IVPB, 2 g, Intravenous, Q24HRS, Barry Ann M.D., Stopped at 20 0216  •  [COMPLETED] vancomycin (VANCOCIN) 2,700 mg in  mL IVPB, 25 mg/kg, Intravenous, Once, Stopped at 20 0633 **FOLLOWED BY** vancomycin (VANCOCIN) 1,300 mg in  mL IVPB, 12 mg/kg, Intravenous, Q8HR, Barry Ann M.D., Stopped at 20 1151  •  methadone (DOLOPHINE) tablet 20 mg, 20 mg, Oral, DAILY, Last Aguilar M.D., 20 mg at 20 1131  •  ibuprofen (MOTRIN) tablet 600 mg, 600 mg, Oral, TID WITH MEALS, Last Aguilar M.D., 600 mg at 20 1131  •  cloNIDine (CATAPRES) 0.1 MG/24HR patch 1 Patch, 1 Patch, Transdermal, Q7 DAYS, Last Aguilar M.D., 1 Patch at 20 1131  •  hydrOXYzine HCl (ATARAX) tablet 10 mg, 10 mg, Oral, TID PRN, Last Aguilar M.D.  •  loperamide (IMODIUM) capsule 2 mg, 2 mg, Oral, 4X/DAY PRN, Last Aguilar M.D.    Physical Exam:   Vital Signs: /72   Pulse 72   Temp 36.2 °C (97.2 °F) (Temporal)   Resp 18   Ht 1.753 m (5' 9\")   Wt 105 kg (231 lb 7.7 oz)   SpO2 96%   BMI 34.18 kg/m²   Temp  Av.9 °C (98.5 °F)  Min: 36.2 °C (97.2 °F)  Max: 37.2 °C (99 °F)  Vital signs reviewed  Constitutional: Patient is oriented to person, place, and time. Appears disheveled, no distress  Head: Atraumatic, normocephalic, hirsute  Eyes: Conjunctivae normal, EOM intact. Pupils are equal, round, and reactive to light.   Mouth/Throat: Lips without lesions, oropharynx is clear and moist.  Neck: Neck supple. No masses/lymphadenopathy  Cardiovascular: Normal rate, regular rhythm, normal S1S2 and intact distal pulses. No murmur, gallop, or friction rub. No pedal " edema.  Pulmonary/Chest: No respiratory distress. Unlabored respiratory effort, lungs clear to auscultation. No wheezes or rales.   Abdominal: Soft, non tender. BS + x 4. No masses or hepatosplenomegaly.   Musculoskeletal: Right knee with pain, swelling, tenderness, increased warmth  Neurological: Alert and oriented to person, place, and time. No gross cranial nerve deficit. No focal neural deficit noted  Skin: Multiple areas of hyperpigmented macules, papules, some scabs, lower extremity more than upper extremity, no involvement of palms and soles, no active pustules noted  Psychiatric: Normal mood and affect. Behavior is normal.     LABS:  Recent Labs     03/23/20 0135   WBC 10.6      Recent Labs     03/23/20 0135   HEMOGLOBIN 12.9   HEMATOCRIT 39.8   MCV 91.1   MCH 29.5   PLATELETCT 233       Recent Labs     03/23/20 0135   SODIUM 133*   POTASSIUM 3.8   CHLORIDE 97   CO2 23   CREATININE 0.85        Recent Labs     03/23/20 0135   ALBUMIN 3.8        MICRO:  Blood Culture   Date Value Ref Range Status   05/26/2017 No growth after 5 days of incubation.  Final        Latest pertinent labs were reviewed    IMAGING STUDIES:  No recent imaging to review    Hospital Course/Assessment:   Kat Devlin is a 34 y.o. female with active IV heroin use, admitted with right knee septic arthritis.  Patient with recent history of gonorrhea, does have multiple skin lesions, including over sites where patient did not inject heroin that could be consistent with disseminated gonorrhea.    Pertinent Diagnoses:  Right knee septic arthritis  Possible disseminated gonococcal infection  IV heroin use    Plan:   -Agree with IV vancomycin and IV ceftriaxone for now.  Follow pending joint aspirate cultures  -Orthopedics consult for possible I&D  -We will check HIV, hepatitis C, urine gonorrhea/chlamydia, syphilis  -Discussed with micro lab that gonococcus is on the differential to ensure adequate culture of the joint aspirate  -Will  give 1 g p.o. azithromycin single dose  -Follow pending blood cultures    Plan was discussed with the primary team, Dr. Aguilar    ID will follow. Please feel free to call with questions.    Christian Zarate M.D.    Please note that this dictation was created using voice recognition software. I have worked with technical experts from [facility] to optimize the interface.  I have made every reasonable attempt to correct obvious errors, but there may be errors of grammar and possibly content that I did not discover before finalizing the note.

## 2020-03-23 NOTE — ED NOTES
Per Dr. Gray kiser for pt to have ice chips. Provided ice chips for pt. No other needs at this time.

## 2020-03-23 NOTE — ED TRIAGE NOTES
Kat Pandyalo  34 y.o. female  Chief Complaint   Patient presents with   • Knee Pain     complaints of right knee pain since 10am this morning; pt states she has been seen here in the past and had her knee drained. Pt denies any recnet trauma        Pt to triage via wheelchair. Pt has abscess on arm from heroin injection at 2130.   Pt is alert and oriented, speaking in full sentences, follows commands and responds appropriately to questions. Resp are even and unlabored. Pt placed in lobby. Pt educated on triage process. Pt encouraged to alert staff for any changes.

## 2020-03-23 NOTE — ED PROVIDER NOTES
"ED Provider Note    Scribed for Fred Castellano M.D. by Dimitrios De La Vega. 3/22/2020, 10:40 PM.    Primary care provider: Pcp Pt States None  Means of arrival: EMS  History obtained from: Patient  History limited by: none    CHIEF COMPLAINT  Chief Complaint   Patient presents with   • Knee Pain     complaints of right knee pain since 10am this morning; pt states she has been seen here in the past and had her knee drained. Pt denies any recnet trauma        HPI  Kat Devlin is a 34 y.o. female who presents to the Emergency Department via EMS for acute right knee pain onset this morning. She states that the knee just started to swell and start hurting, and she has been seen before for the same symptoms. She states that the previous time, it \"filled up with fluid\" and it had to be drained. She denies any injury to the knee, and she believes that \"when I fall asleep it causes my knee to twist and trigger it\". She adds that she was able to walk this morning with a limp, and she has multiple bruises on her leg from heroin injections. She has no known allergies to medications.     REVIEW OF SYSTEMS  See HPI for further details. All other systems are negative.     PAST MEDICAL HISTORY   has a past medical history of Arthritis, ASTHMA, Back pain (2011), Headache(784.0) (2011), Migraines, Obesity, Pain (2012), PCOS (polycystic ovarian syndrome), Psychiatric disorder, and Type II or unspecified type diabetes mellitus without mention of complication, not stated as uncontrolled.    SURGICAL HISTORY   has a past surgical history that includes  delivery only; repeat c section (2012); other; other (2014); tubal coagulation laparoscopic bilateral (2015); dilation and curettage (2015); and hysteroscopy novasure-2 (2015).    SOCIAL HISTORY  Social History     Tobacco Use   • Smoking status: Current Every Day Smoker     Packs/day: 1.00     Years: 18.00     Pack years: 18.00     Types: " "Cigarettes   • Smokeless tobacco: Never Used   Substance Use Topics   • Alcohol use: No   • Drug use: Yes     Types: Intravenous     Comment: Heroin; last use this morning.      Social History     Substance and Sexual Activity   Drug Use Yes   • Types: Intravenous    Comment: Heroin; last use this morning.       FAMILY HISTORY  Family History   Problem Relation Age of Onset   • Non-contributory Mother    • Hyperlipidemia Mother    • Non-contributory Father    • Alcohol/Drug Father    • Heart Disease Maternal Grandfather    • Arthritis Maternal Grandmother    • Hyperlipidemia Maternal Grandmother        CURRENT MEDICATIONS  Reviewed.  See Encounter Summary.     ALLERGIES  No Known Allergies    PHYSICAL EXAM  VITAL SIGNS: /97   Pulse 99   Temp 37.2 °C (99 °F) (Oral)   Resp 18   Ht 1.753 m (5' 9\")   Wt 106.6 kg (235 lb)   SpO2 99%   BMI 34.70 kg/m²   Constitutional: Alert in no apparent distress.  HENT: No signs of trauma, Bilateral external ears normal, Nose normal.   Eyes: Pupils are equal and reactive, Conjunctiva normal, Non-icteric.   Neck: Normal range of motion, No tenderness, Supple, No stridor.   Cardiovascular: Regular rate and rhythm, no murmurs.   Thorax & Lungs: Normal breath sounds, No respiratory distress, No wheezing, No chest tenderness.   Abdomen: Bowel sounds normal, Soft, No tenderness, No masses, No pulsatile masses. No peritoneal signs.  Skin: Warm, Dry, No erythema, No rash.  Multiple areas of ecchymosis to all extremities (patient states that she injects heroin )  Back: No bony tenderness, No CVA tenderness.   Extremities: Intact distal pulses, No cyanosis. Large joint effusion to right knee. Increased warmth with no erythema  Neurologic: Alert , Normal motor function, Normal sensory function, No focal deficits noted.   Psychiatric: Affect normal, Judgment normal, Mood normal.       DIAGNOSTIC STUDIES / PROCEDURES     LABS  Results for orders placed or performed during the hospital " encounter of 03/22/20   FLUID CELL COUNT   Result Value Ref Range    Fluid Type Synovial     Color-Body Fluid Yellow     Character-Body Fluid Cloudy     Total RBC Count 5000 cells/uL    Total WBC 12491 cells/uL    Polys 86 %    Lymphs 4 %    Mononuclear Cells - Fluid 10 %    Comments see below    FLUID TOTAL PROTEIN   Result Value Ref Range    Fluid Type Synovial     Total Protein Fluid 5.2 g/dL   FLUID GLUCOSE   Result Value Ref Range    Glucose, Fluid 67 mg/dL   FLUID CRYSTALS   Result Value Ref Range    Crystals, Body Fluid None Seen None Seen   GRAM STAIN   Result Value Ref Range    Significant Indicator .     Source BF     Site OTHER BODY FLUID     Gram Stain Result Many WBCs.  No organisms seen.      CBC WITH DIFFERENTIAL   Result Value Ref Range    WBC 10.6 4.8 - 10.8 K/uL    RBC 4.37 4.20 - 5.40 M/uL    Hemoglobin 12.9 12.0 - 16.0 g/dL    Hematocrit 39.8 37.0 - 47.0 %    MCV 91.1 81.4 - 97.8 fL    MCH 29.5 27.0 - 33.0 pg    MCHC 32.4 (L) 33.6 - 35.0 g/dL    RDW 44.5 35.9 - 50.0 fL    Platelet Count 233 164 - 446 K/uL    MPV 9.0 9.0 - 12.9 fL    Neutrophils-Polys 64.20 44.00 - 72.00 %    Lymphocytes 27.20 22.00 - 41.00 %    Monocytes 7.80 0.00 - 13.40 %    Eosinophils 0.10 0.00 - 6.90 %    Basophils 0.30 0.00 - 1.80 %    Immature Granulocytes 0.40 0.00 - 0.90 %    Nucleated RBC 0.00 /100 WBC    Neutrophils (Absolute) 6.80 2.00 - 7.15 K/uL    Lymphs (Absolute) 2.88 1.00 - 4.80 K/uL    Monos (Absolute) 0.83 0.00 - 0.85 K/uL    Eos (Absolute) 0.01 0.00 - 0.51 K/uL    Baso (Absolute) 0.03 0.00 - 0.12 K/uL    Immature Granulocytes (abs) 0.04 0.00 - 0.11 K/uL    NRBC (Absolute) 0.00 K/uL   COMP METABOLIC PANEL   Result Value Ref Range    Sodium 133 (L) 135 - 145 mmol/L    Potassium 3.8 3.6 - 5.5 mmol/L    Chloride 97 96 - 112 mmol/L    Co2 23 20 - 33 mmol/L    Anion Gap 13.0 7.0 - 16.0    Glucose 109 (H) 65 - 99 mg/dL    Bun 10 8 - 22 mg/dL    Creatinine 0.85 0.50 - 1.40 mg/dL    Calcium 8.9 8.5 - 10.5 mg/dL     AST(SGOT) 10 (L) 12 - 45 U/L    ALT(SGPT) 7 2 - 50 U/L    Alkaline Phosphatase 76 30 - 99 U/L    Total Bilirubin 2.4 (H) 0.1 - 1.5 mg/dL    Albumin 3.8 3.2 - 4.9 g/dL    Total Protein 7.7 6.0 - 8.2 g/dL    Globulin 3.9 (H) 1.9 - 3.5 g/dL    A-G Ratio 1.0 g/dL   Lactic Acid -STAT Once   Result Value Ref Range    Lactic Acid 1.7 0.5 - 2.0 mmol/L   Prothrombin time (INR)   Result Value Ref Range    PT 15.4 (H) 12.0 - 14.6 sec    INR 1.19 (H) 0.87 - 1.13   ESTIMATED GFR   Result Value Ref Range    GFR If African American >60 >60 mL/min/1.73 m 2    GFR If Non African American >60 >60 mL/min/1.73 m 2      All labs were reviewed by me.    Arthrocentesis Procedure Note    Indication: Joint pain, joint swelling and to obtain joint fluid for diagnostic testing    Consent: The patient provided verbal consent for this procedure.    Procedure: The right knee was positioned appropriately and the landmarks were identified.  Local anesthesia was obtained by infiltration using 1% Lidocaine without epinephrine.  The area was then prepped with betadine and draped in the usual sterile fashion.  A needle was then introduced into the lateral joint space at which point 35 cc of turbid, straw colored fluid was aspirated.  A joint injection was not performed.  The aspirated fluid was sent to the lab for appropriate testing.  A sterile dressing was then applied to the site.    The patient tolerated the procedure well.    Complications: None         COURSE & MEDICAL DECISION MAKING  Pertinent Labs & Imaging studies reviewed. (See chart for details)    10:40 PM - Patient seen and examined at bedside. Patient will be treated with 1% lidocaine injection. Ordered Fluid cell count, fluid total protein, Fluid glucose, fluid culture with gram stain, and fluid crystals to evaluate her symptoms. I informed the patient that I will have to drain the knee, and then I will send it for labs. Patient verbalizes understanding and agreement to this plan of  care.       11:04 PM I preformed the arthrocentesis at bedside (see procedure note above).     12:31 AM I reviewed the patient's lab results which shows that she has bacterial arthritis of her right knee. I will page the hospitalist at this time. I also ordered CBC with diff, CMP, and blood culture for further evaluation.     12:46 AM Patient was reevaluated at bedside. Discussed lab results with the patient and informed them of the test result. I also informed her that she will be staying in the hospital. Patient verbalizes understanding and agreement to this plan of care.      12:48 AM I discussed the patient's case and the above findings with Dr. Ann (hospitalist) who will evaluate the patient for hospitalization.        Decision Making:  This is a 34 y.o. year old female who presents with acute right knee pain and swelling.  Prior history of same.  Chart review reveals that the patient was recently seen and diagnosed with gonorrhea.  This is taken certainly be a disseminated gonorrhea infection.  Needle aspiration meeting criteria for acute bacterial infection of the right knee.  I want the patient brought into the hospital service for ongoing inpatient care.  Ceftriaxone and vancomycin have been started here in the ER.    DISPOSITION:  Patient will be hospitalized by Dr. Ann in guarded condition.     FINAL IMPRESSION  1. Bacterial arthritis of right knee (HCC)       2.  Heroin injection substance abuse     Dimitrios HARRIS (Segun), am scribing for, and in the presence of, Fred Castellano M.D..    Electronically signed by: Dimitrios De La Vega (Segun), 3/22/2020    Fred HARRIS M.D. personally performed the services described in this documentation, as scribed by Dimitrios De La Vega in my presence, and it is both accurate and complete. C.    The note accurately reflects work and decisions made by me.  Fred Castellano M.D.  3/23/2020  4:13 AM

## 2020-03-23 NOTE — PROGRESS NOTES
"Pharmacy Kinetics 34 y.o. female on vancomycin day # 1 3/23/2020    Currently on Vancomycin new start  Provider specified end date: TBD    Indication for Treatment: septic arthritis    Pertinent history per medical record: Admitted on 3/22/2020 for septic arthritis of knee. Patient has had to have knee drained in past. Also, patient with h/o IVDU per chart review.    Other antibiotics: ceftriaxone    Allergies: Patient has no known allergies.     List concerns for renal function (possible concerns include abnormal LFTs, BUN/SCr ratio > 20:1, CHF, obesity, malnutrition/low albumin, hypermetabolic state (SIRS), pressors/hypotension, nephrotoxic drugs, etc.): obesity    Pertinent cultures to date:   Blood culture X2 in process  Fluid culture X1 in process      MRSA nares swab if pneumonia is a concern (ordered/positive/negative/n-a): N/A    No results for input(s): WBC, NEUTSPOLYS, BANDSSTABS in the last 72 hours.  No results for input(s): BUN, CREATININE, ALBUMIN in the last 72 hours.  No results for input(s): VANCOTROUGH, VANCOPEAK, VANCORANDOM in the last 72 hours.No intake or output data in the 24 hours ending 20 0135   /91   Pulse 88   Temp 37.2 °C (99 °F) (Oral)   Resp (!) 22   Ht 1.753 m (5' 9\")   Wt 106.6 kg (235 lb)   SpO2 95%  Temp (24hrs), Av.2 °C (99 °F), Min:37.2 °C (99 °F), Max:37.2 °C (99 °F)      A/P   1. Vancomycin dose change: Start vancomycin 2700 mg load followed by 1300 mg (12mg/kg) q8H (0200,1000,1800) X4 doses  2. Next vancomycin level: Anticipate prior to 4th or 5th dose or sooner pending renal function- will need to be ordered  3. Goal trough: 12-16 mcg/mL  4. Comments: 34 year old female admitted for septic arthritis of knee. Blood/fluid cultures currently in process. Concerns for renal function listed above. Per Epic, patient has tolerated q8H dosing in the past with therapeutic vancomycin levels. Will start vancomycin 1300 mg (12 mg/kg) q8H (0200,1000,1800) X 4 doses.  " Pharmacy will continue to follow.    Audrey Eduardo, PharmD

## 2020-03-23 NOTE — PROGRESS NOTES
"Pharmacy Kinetics 34 y.o. female on vancomycin day # 1 3/23/2020    Currently on Vancomycin 1300 mg iv q8hr   (0200, 1000, 1800)  Provider specified end date: TBD    Indication for Treatment: septic arthritis     Pertinent history per medical record: Admitted on 3/22/2020 for septic arthritis of knee. Patient has had to have knee drained in past. Also, patient with h/o IVDU per chart review. ID has been consulted.     Other antibiotics: ceftriaxone 2g IV q24h    Allergies: Patient has no known allergies.     List concerns for renal function: obesity (BMI 34)    Pertinent cultures to date:   3/22/20: synovial fluid cx - in process  3/23/20: Blood x2 - in process    MRSA nares swab if pneumonia is a concern: n/a    Recent Labs     20  0135   WBC 10.6   NEUTSPOLYS 64.20     Recent Labs     20  0135   BUN 10   CREATININE 0.85   ALBUMIN 3.8     No results for input(s): VANCOTROUGH, VANCOPEAK, VANCORANDOM in the last 72 hours.No intake or output data in the 24 hours ending 20 1653   /67   Pulse 79   Temp 36.2 °C (97.1 °F) (Temporal)   Resp 16   Ht 1.753 m (5' 9\")   Wt 105 kg (231 lb 7.7 oz)   SpO2 96%  Temp (24hrs), Av.8 °C (98.3 °F), Min:36.2 °C (97.1 °F), Max:37.2 °C (99 °F)      A/P   1. Vancomycin dose change: none  2. Next vancomycin level: tomorrow, 3/24, at 0930   (ordered)  3. Goal trough: 12-16 mcg/mL  4. Comments: No leukocytosis and afebrile. Little concern for accumulation d/t body habitus. Renal function appears stable per renal indices. Trough level ordered for prior to the 5th dose to assess accumulation and dose appropriateness. Pharmacy to continue to follow ID recommendations.      Mira Azar, PharmD., BCPS        "

## 2020-03-24 ENCOUNTER — ANESTHESIA EVENT (OUTPATIENT)
Dept: SURGERY | Facility: MEDICAL CENTER | Age: 35
DRG: 550 | End: 2020-03-24
Payer: COMMERCIAL

## 2020-03-24 ENCOUNTER — ANESTHESIA (OUTPATIENT)
Dept: SURGERY | Facility: MEDICAL CENTER | Age: 35
DRG: 550 | End: 2020-03-24
Payer: COMMERCIAL

## 2020-03-24 LAB
ANION GAP SERPL CALC-SCNC: 11 MMOL/L (ref 7–16)
BASOPHILS # BLD AUTO: 0.1 % (ref 0–1.8)
BASOPHILS # BLD: 0.01 K/UL (ref 0–0.12)
BUN SERPL-MCNC: 9 MG/DL (ref 8–22)
C TRACH DNA SPEC QL NAA+PROBE: NEGATIVE
CALCIUM SERPL-MCNC: 8.7 MG/DL (ref 8.5–10.5)
CHLORIDE SERPL-SCNC: 105 MMOL/L (ref 96–112)
CO2 SERPL-SCNC: 20 MMOL/L (ref 20–33)
CREAT SERPL-MCNC: 0.63 MG/DL (ref 0.5–1.4)
EOSINOPHIL # BLD AUTO: 0.08 K/UL (ref 0–0.51)
EOSINOPHIL NFR BLD: 1 % (ref 0–6.9)
ERYTHROCYTE [DISTWIDTH] IN BLOOD BY AUTOMATED COUNT: 43.3 FL (ref 35.9–50)
GLUCOSE SERPL-MCNC: 119 MG/DL (ref 65–99)
GRAM STN SPEC: NORMAL
HCG UR QL: NEGATIVE
HCT VFR BLD AUTO: 37.1 % (ref 37–47)
HGB BLD-MCNC: 12.8 G/DL (ref 12–16)
IMM GRANULOCYTES # BLD AUTO: 0.06 K/UL (ref 0–0.11)
IMM GRANULOCYTES NFR BLD AUTO: 0.7 % (ref 0–0.9)
LYMPHOCYTES # BLD AUTO: 1.48 K/UL (ref 1–4.8)
LYMPHOCYTES NFR BLD: 17.8 % (ref 22–41)
MCH RBC QN AUTO: 30.5 PG (ref 27–33)
MCHC RBC AUTO-ENTMCNC: 34.5 G/DL (ref 33.6–35)
MCV RBC AUTO: 88.3 FL (ref 81.4–97.8)
MONOCYTES # BLD AUTO: 0.2 K/UL (ref 0–0.85)
MONOCYTES NFR BLD AUTO: 2.4 % (ref 0–13.4)
N GONORRHOEA DNA SPEC QL NAA+PROBE: NEGATIVE
NEUTROPHILS # BLD AUTO: 6.47 K/UL (ref 2–7.15)
NEUTROPHILS NFR BLD: 78 % (ref 44–72)
NRBC # BLD AUTO: 0 K/UL
NRBC BLD-RTO: 0 /100 WBC
PLATELET # BLD AUTO: 193 K/UL (ref 164–446)
PMV BLD AUTO: 10.1 FL (ref 9–12.9)
POTASSIUM SERPL-SCNC: 4.5 MMOL/L (ref 3.6–5.5)
RBC # BLD AUTO: 4.2 M/UL (ref 4.2–5.4)
SIGNIFICANT IND 70042: NORMAL
SITE SITE: NORMAL
SODIUM SERPL-SCNC: 136 MMOL/L (ref 135–145)
SOURCE SOURCE: NORMAL
SPECIMEN SOURCE: NORMAL
VANCOMYCIN TROUGH SERPL-MCNC: 13.3 UG/ML (ref 10–20)
WBC # BLD AUTO: 8.3 K/UL (ref 4.8–10.8)

## 2020-03-24 PROCEDURE — A9270 NON-COVERED ITEM OR SERVICE: HCPCS | Performed by: INTERNAL MEDICINE

## 2020-03-24 PROCEDURE — 700102 HCHG RX REV CODE 250 W/ 637 OVERRIDE(OP): Performed by: HOSPITALIST

## 2020-03-24 PROCEDURE — A9270 NON-COVERED ITEM OR SERVICE: HCPCS | Performed by: HOSPITALIST

## 2020-03-24 PROCEDURE — 500878 HCHG PACK, ARTHROSCOPY: Performed by: ORTHOPAEDIC SURGERY

## 2020-03-24 PROCEDURE — 700102 HCHG RX REV CODE 250 W/ 637 OVERRIDE(OP): Performed by: INTERNAL MEDICINE

## 2020-03-24 PROCEDURE — 87522 HEPATITIS C REVRS TRNSCRPJ: CPT

## 2020-03-24 PROCEDURE — 700102 HCHG RX REV CODE 250 W/ 637 OVERRIDE(OP): Performed by: ANESTHESIOLOGY

## 2020-03-24 PROCEDURE — 700111 HCHG RX REV CODE 636 W/ 250 OVERRIDE (IP): Performed by: ANESTHESIOLOGY

## 2020-03-24 PROCEDURE — 160048 HCHG OR STATISTICAL LEVEL 1-5: Performed by: ORTHOPAEDIC SURGERY

## 2020-03-24 PROCEDURE — 160036 HCHG PACU - EA ADDL 30 MINS PHASE I: Performed by: ORTHOPAEDIC SURGERY

## 2020-03-24 PROCEDURE — 87075 CULTR BACTERIA EXCEPT BLOOD: CPT

## 2020-03-24 PROCEDURE — 99232 SBSQ HOSP IP/OBS MODERATE 35: CPT | Performed by: INTERNAL MEDICINE

## 2020-03-24 PROCEDURE — 160009 HCHG ANES TIME/MIN: Performed by: ORTHOPAEDIC SURGERY

## 2020-03-24 PROCEDURE — 99233 SBSQ HOSP IP/OBS HIGH 50: CPT | Performed by: INTERNAL MEDICINE

## 2020-03-24 PROCEDURE — 501838 HCHG SUTURE GENERAL: Performed by: ORTHOPAEDIC SURGERY

## 2020-03-24 PROCEDURE — 160035 HCHG PACU - 1ST 60 MINS PHASE I: Performed by: ORTHOPAEDIC SURGERY

## 2020-03-24 PROCEDURE — 160029 HCHG SURGERY MINUTES - 1ST 30 MINS LEVEL 4: Performed by: ORTHOPAEDIC SURGERY

## 2020-03-24 PROCEDURE — 160041 HCHG SURGERY MINUTES - EA ADDL 1 MIN LEVEL 4: Performed by: ORTHOPAEDIC SURGERY

## 2020-03-24 PROCEDURE — 770006 HCHG ROOM/CARE - MED/SURG/GYN SEMI*

## 2020-03-24 PROCEDURE — 36415 COLL VENOUS BLD VENIPUNCTURE: CPT

## 2020-03-24 PROCEDURE — 87070 CULTURE OTHR SPECIMN AEROBIC: CPT

## 2020-03-24 PROCEDURE — 700105 HCHG RX REV CODE 258: Performed by: INTERNAL MEDICINE

## 2020-03-24 PROCEDURE — 160002 HCHG RECOVERY MINUTES (STAT): Performed by: ORTHOPAEDIC SURGERY

## 2020-03-24 PROCEDURE — A9270 NON-COVERED ITEM OR SERVICE: HCPCS | Performed by: ANESTHESIOLOGY

## 2020-03-24 PROCEDURE — 85025 COMPLETE CBC W/AUTO DIFF WBC: CPT

## 2020-03-24 PROCEDURE — 80202 ASSAY OF VANCOMYCIN: CPT

## 2020-03-24 PROCEDURE — 80048 BASIC METABOLIC PNL TOTAL CA: CPT

## 2020-03-24 PROCEDURE — 3E1U48Z IRRIGATION OF JOINTS USING IRRIGATING SUBSTANCE, PERCUTANEOUS ENDOSCOPIC APPROACH: ICD-10-PCS | Performed by: ORTHOPAEDIC SURGERY

## 2020-03-24 PROCEDURE — 700111 HCHG RX REV CODE 636 W/ 250 OVERRIDE (IP): Performed by: INTERNAL MEDICINE

## 2020-03-24 PROCEDURE — 87205 SMEAR GRAM STAIN: CPT

## 2020-03-24 PROCEDURE — 700101 HCHG RX REV CODE 250: Performed by: ANESTHESIOLOGY

## 2020-03-24 RX ORDER — OXYCODONE HCL 5 MG/5 ML
10 SOLUTION, ORAL ORAL
Status: COMPLETED | OUTPATIENT
Start: 2020-03-24 | End: 2020-03-24

## 2020-03-24 RX ORDER — LIDOCAINE HYDROCHLORIDE 40 MG/ML
SOLUTION TOPICAL PRN
Status: DISCONTINUED | OUTPATIENT
Start: 2020-03-24 | End: 2020-03-24 | Stop reason: SURG

## 2020-03-24 RX ORDER — IPRATROPIUM BROMIDE AND ALBUTEROL SULFATE 2.5; .5 MG/3ML; MG/3ML
3 SOLUTION RESPIRATORY (INHALATION)
Status: DISCONTINUED | OUTPATIENT
Start: 2020-03-24 | End: 2020-03-24 | Stop reason: HOSPADM

## 2020-03-24 RX ORDER — DIPHENHYDRAMINE HYDROCHLORIDE 50 MG/ML
12.5 INJECTION INTRAMUSCULAR; INTRAVENOUS
Status: DISCONTINUED | OUTPATIENT
Start: 2020-03-24 | End: 2020-03-24 | Stop reason: HOSPADM

## 2020-03-24 RX ORDER — CEFAZOLIN SODIUM 1 G/3ML
INJECTION, POWDER, FOR SOLUTION INTRAMUSCULAR; INTRAVENOUS PRN
Status: DISCONTINUED | OUTPATIENT
Start: 2020-03-24 | End: 2020-03-24 | Stop reason: SURG

## 2020-03-24 RX ORDER — MIDAZOLAM HYDROCHLORIDE 1 MG/ML
1 INJECTION INTRAMUSCULAR; INTRAVENOUS
Status: DISCONTINUED | OUTPATIENT
Start: 2020-03-24 | End: 2020-03-24 | Stop reason: HOSPADM

## 2020-03-24 RX ORDER — ONDANSETRON 2 MG/ML
4 INJECTION INTRAMUSCULAR; INTRAVENOUS
Status: DISCONTINUED | OUTPATIENT
Start: 2020-03-24 | End: 2020-03-24 | Stop reason: HOSPADM

## 2020-03-24 RX ORDER — HYDROMORPHONE HYDROCHLORIDE 1 MG/ML
0.2 INJECTION, SOLUTION INTRAMUSCULAR; INTRAVENOUS; SUBCUTANEOUS
Status: DISCONTINUED | OUTPATIENT
Start: 2020-03-24 | End: 2020-03-24 | Stop reason: HOSPADM

## 2020-03-24 RX ORDER — HYDROMORPHONE HYDROCHLORIDE 1 MG/ML
1 INJECTION, SOLUTION INTRAMUSCULAR; INTRAVENOUS; SUBCUTANEOUS
Status: DISCONTINUED | OUTPATIENT
Start: 2020-03-24 | End: 2020-03-24 | Stop reason: HOSPADM

## 2020-03-24 RX ORDER — HALOPERIDOL 5 MG/ML
1 INJECTION INTRAMUSCULAR
Status: DISCONTINUED | OUTPATIENT
Start: 2020-03-24 | End: 2020-03-24 | Stop reason: HOSPADM

## 2020-03-24 RX ORDER — HYDROMORPHONE HYDROCHLORIDE 1 MG/ML
0.4 INJECTION, SOLUTION INTRAMUSCULAR; INTRAVENOUS; SUBCUTANEOUS
Status: DISCONTINUED | OUTPATIENT
Start: 2020-03-24 | End: 2020-03-24 | Stop reason: HOSPADM

## 2020-03-24 RX ORDER — SODIUM CHLORIDE, SODIUM GLUCONATE, SODIUM ACETATE, POTASSIUM CHLORIDE AND MAGNESIUM CHLORIDE 526; 502; 368; 37; 30 MG/100ML; MG/100ML; MG/100ML; MG/100ML; MG/100ML
500 INJECTION, SOLUTION INTRAVENOUS CONTINUOUS
Status: DISCONTINUED | OUTPATIENT
Start: 2020-03-24 | End: 2020-03-24 | Stop reason: HOSPADM

## 2020-03-24 RX ORDER — MEPERIDINE HYDROCHLORIDE 25 MG/ML
12.5 INJECTION INTRAMUSCULAR; INTRAVENOUS; SUBCUTANEOUS
Status: DISCONTINUED | OUTPATIENT
Start: 2020-03-24 | End: 2020-03-24 | Stop reason: HOSPADM

## 2020-03-24 RX ORDER — OXYCODONE HCL 5 MG/5 ML
5 SOLUTION, ORAL ORAL
Status: COMPLETED | OUTPATIENT
Start: 2020-03-24 | End: 2020-03-24

## 2020-03-24 RX ADMIN — VANCOMYCIN HYDROCHLORIDE 1300 MG: 500 INJECTION, POWDER, LYOPHILIZED, FOR SOLUTION INTRAVENOUS at 01:37

## 2020-03-24 RX ADMIN — CEFTRIAXONE SODIUM 2 G: 2 INJECTION, POWDER, FOR SOLUTION INTRAMUSCULAR; INTRAVENOUS at 05:11

## 2020-03-24 RX ADMIN — IBUPROFEN 600 MG: 600 TABLET ORAL at 11:29

## 2020-03-24 RX ADMIN — ACETAMINOPHEN 650 MG: 325 TABLET, FILM COATED ORAL at 05:09

## 2020-03-24 RX ADMIN — FENTANYL CITRATE 50 MCG: 50 INJECTION, SOLUTION INTRAMUSCULAR; INTRAVENOUS at 08:46

## 2020-03-24 RX ADMIN — OXYCODONE HYDROCHLORIDE 10 MG: 10 TABLET ORAL at 16:53

## 2020-03-24 RX ADMIN — PENICILLIN G BENZATHINE 2.4 MILLION UNITS: 1200000 INJECTION, SUSPENSION INTRAMUSCULAR at 17:20

## 2020-03-24 RX ADMIN — OXYCODONE HYDROCHLORIDE 10 MG: 10 TABLET ORAL at 05:10

## 2020-03-24 RX ADMIN — HYDROXYZINE HYDROCHLORIDE 10 MG: 10 TABLET, FILM COATED ORAL at 02:07

## 2020-03-24 RX ADMIN — IBUPROFEN 600 MG: 600 TABLET ORAL at 16:53

## 2020-03-24 RX ADMIN — OXYCODONE HYDROCHLORIDE 10 MG: 10 TABLET ORAL at 11:29

## 2020-03-24 RX ADMIN — METHADONE HYDROCHLORIDE 20 MG: 10 TABLET ORAL at 05:09

## 2020-03-24 RX ADMIN — MORPHINE SULFATE 4 MG: 4 INJECTION INTRAVENOUS at 03:02

## 2020-03-24 RX ADMIN — ROCURONIUM BROMIDE 80 MG: 10 INJECTION, SOLUTION INTRAVENOUS at 07:49

## 2020-03-24 RX ADMIN — FENTANYL CITRATE 50 MCG: 50 INJECTION, SOLUTION INTRAMUSCULAR; INTRAVENOUS at 08:35

## 2020-03-24 RX ADMIN — VANCOMYCIN HYDROCHLORIDE 1300 MG: 500 INJECTION, POWDER, LYOPHILIZED, FOR SOLUTION INTRAVENOUS at 11:29

## 2020-03-24 RX ADMIN — CEFAZOLIN 2 G: 330 INJECTION, POWDER, FOR SOLUTION INTRAMUSCULAR; INTRAVENOUS at 07:44

## 2020-03-24 RX ADMIN — LIDOCAINE HYDROCHLORIDE 40 MG: 20 INJECTION, SOLUTION INTRAVENOUS at 07:49

## 2020-03-24 RX ADMIN — OXYCODONE HYDROCHLORIDE 10 MG: 5 SOLUTION ORAL at 08:35

## 2020-03-24 RX ADMIN — LIDOCAINE HYDROCHLORIDE 4 ML: 40 SOLUTION TOPICAL at 07:49

## 2020-03-24 RX ADMIN — PROPOFOL 150 MG: 10 INJECTION, EMULSION INTRAVENOUS at 07:49

## 2020-03-24 RX ADMIN — OXYCODONE HYDROCHLORIDE 10 MG: 10 TABLET ORAL at 01:34

## 2020-03-24 ASSESSMENT — ENCOUNTER SYMPTOMS
CHILLS: 0
FEVER: 0
TREMORS: 0
HEARTBURN: 0
FLANK PAIN: 0
SPUTUM PRODUCTION: 0
HEADACHES: 0
SHORTNESS OF BREATH: 0
ORTHOPNEA: 0
NERVOUS/ANXIOUS: 0
HALLUCINATIONS: 0
SPEECH CHANGE: 0
COUGH: 0
ABDOMINAL PAIN: 0
BRUISES/BLEEDS EASILY: 0
VOMITING: 0
DIARRHEA: 0
FOCAL WEAKNESS: 0
NAUSEA: 0
HEMOPTYSIS: 0
WEIGHT LOSS: 0
POLYDIPSIA: 0
BLURRED VISION: 0
NECK PAIN: 0
PHOTOPHOBIA: 0
DOUBLE VISION: 0
BACK PAIN: 0
PALPITATIONS: 0

## 2020-03-24 ASSESSMENT — PAIN SCALES - GENERAL: PAIN_LEVEL: 5

## 2020-03-24 ASSESSMENT — LIFESTYLE VARIABLES: SUBSTANCE_ABUSE: 0

## 2020-03-24 NOTE — ANESTHESIA TIME REPORT
Anesthesia Start and Stop Event Times     Date Time Event    3/24/2020 0720 Ready for Procedure     0744 Anesthesia Start     0830 Anesthesia Stop        Responsible Staff  03/24/20    Name Role Begin End    Kurt Haynes III, M.D. Anesth 0744 0830        Preop Diagnosis (Free Text):  Pre-op Diagnosis     Bacterial arthritis of right knee         Preop Diagnosis (Codes):    Post op Diagnosis  Septic arthritis of knee, right (HCC)      Premium Reason  Non-Premium    Comments:

## 2020-03-24 NOTE — CONSULTS
Date of Service: 20    CC: Right knee pain and swelling    HPI: Kat Devlin is a 34 y.o. female who presents with complaints of pain to right knee.  This started 3 to 4 days ago after IV drug abuse and injections near this area.  The pain is 6/10 and is described as sharp.  The pain is made worse by palpation of the area and made better by rest and immobilization.  She had an aspiration in the emergency department which came back with 77,000 white blood cells with predominantly PMNs.  No organisms or crystals seen.  Pain has improved somewhat after the knee was drained.    PMH:   Past Medical History:   Diagnosis Date   • Arthritis     knee and back   • ASTHMA     rarely. uses inhaler exercise inducted   • Back pain 2011   • Headache(784.0) 2011   • Migraines    • Obesity    • Pain 2012    bilat. feet 6/10   • PCOS (polycystic ovarian syndrome)    • Psychiatric disorder     depression and anxiety   • Type II or unspecified type diabetes mellitus without mention of complication, not stated as uncontrolled     diet and oral meds       PSH:   Past Surgical History:   Procedure Laterality Date   • TUBAL COAGULATION LAPAROSCOPIC BILATERAL  2015    Performed by Juan R Sutton M.D. at SURGERY SAME DAY [facility]   • DILATION AND CURETTAGE  2015    Performed by Juan R Sutton M.D. at SURGERY SAME DAY [facility]   • HYSTEROSCOPY NOVASURE-2  2015    Performed by Juan R Sutton M.D. at SURGERY [facility] DAY [facility]   • OTHER  2014    oral surgery in office   • REPEAT C SECTION  2012    Performed by DESIREE TUCKER at LABOR AND DELIVERY   • OTHER      bilateral knee surgery   • [place]  DELIVERY ONLY         FH:   Family History   Problem Relation Age of Onset   • Non-contributory Mother    • Hyperlipidemia Mother    • Non-contributory Father    • Alcohol/Drug Father    • Heart Disease Maternal Grandfather    • Arthritis Maternal Grandmother    • Hyperlipidemia Maternal  "Grandmother        SH:   Social History     Socioeconomic History   • Marital status: Single     Spouse name: Not on file   • Number of children: Not on file   • Years of education: Not on file   • Highest education level: Not on file   Occupational History   • Not on file   Social Needs   • Financial resource strain: Not on file   • Food insecurity     Worry: Not on file     Inability: Not on file   • Transportation needs     Medical: Not on file     Non-medical: Not on file   Tobacco Use   • Smoking status: Current Every Day Smoker     Packs/day: 1.00     Years: 18.00     Pack years: 18.00     Types: Cigarettes   • Smokeless tobacco: Never Used   Substance and Sexual Activity   • Alcohol use: No   • Drug use: Yes     Types: Intravenous     Comment: Heroin; last use this morning.   • Sexual activity: Yes     Partners: Male   Lifestyle   • Physical activity     Days per week: Not on file     Minutes per session: Not on file   • Stress: Not on file   Relationships   • Social connections     Talks on phone: Not on file     Gets together: Not on file     Attends Sikhism service: Not on file     Active member of club or organization: Not on file     Attends meetings of clubs or organizations: Not on file     Relationship status: Not on file   • Intimate partner violence     Fear of current or ex partner: Not on file     Emotionally abused: Not on file     Physically abused: Not on file     Forced sexual activity: Not on file   Other Topics Concern   • Not on file   Social History Narrative    ** Merged History Encounter **            ROS: A 10 system review of systems was negative outside what was listed in the HPI    /67   Pulse 79   Temp 36.2 °C (97.1 °F) (Temporal)   Resp 16   Ht 1.753 m (5' 9\")   Wt 105 kg (231 lb 7.7 oz)   SpO2 96%     Physical Exam:  General: AAOx3, NAD  HEENT: normocephalic, atraumatic  Psych: Normal mood and affect  Neck: supple, no pain to motion  Chest/Pulmonary: breathing " unlabored, no audible wheezing  Cardio: regular heart rate and rhythm  Neuro: sensation grossly intact to BUE and BLE, moving all four extremities  Skin: Numerous sites of bruising and erythema to lower extremities from injection sites  MSK: Right knee with previous aspiration site passive range of motion is less tender today.  Still a small effusion present.  Remainder of major joints in all 4 extremities showed no tenderness to range of motion.    Imaging and labs: Aspiration of knee shows 77,000 white cells and no crystals  Recent Labs     03/23/20  0135   WBC 10.6   RBC 4.37   HEMOGLOBIN 12.9   HEMATOCRIT 39.8   MCV 91.1   MCH 29.5   RDW 44.5   PLATELETCT 233   MPV 9.0   NEUTSPOLYS 64.20   LYMPHOCYTES 27.20   MONOCYTES 7.80   EOSINOPHILS 0.10   BASOPHILS 0.30         Assessment:   1. Bacterial arthritis of right knee (HCC)         We discussed the diagnosis and findings with the patient at length.  We reviewed possible non operative and operative interventions and the risks and benefits of these.  Recommended debridement of the right knee.  This likely will be done arthroscopically.  We discussed the risks and benefits of this procedure and discussed that there is a risk of recurrence of infection and may need additional procedures.  She had a chance to ask questions and all of these were answered to her satisfaction.        Plan:  N.p.o. at midnight  OR tomorrow  Antibiotics based on cultures

## 2020-03-24 NOTE — PROGRESS NOTES
Hospital Medicine Daily Progress Note    Date of Service  3/24/2020    Chief Complaint/Hospital Course  34 y.o. female with a past medical history of IV drug use, tobacco abuse, who presented 3/22/2020 with right knee pain , admitted with septic arthritis of right knee, undergone arthroscopic lavage of right knee on 3/20        Interval Problem Update  Patient is sleeping and residual effect of anesthesia.  Vitals stable.  Afebrile.  Knee pain is controlled.  Synovial fluid Gram stain showed moderate white blood cells, no organisms seen    Consultants/Specialty  Orthopedic surgery    Code Status  Full code    Disposition  Be discussed    Review of Systems  Review of Systems   Constitutional: Negative for chills, fever and weight loss.   HENT: Negative for ear pain, hearing loss and tinnitus.    Eyes: Negative for blurred vision, double vision and photophobia.   Respiratory: Negative for cough, hemoptysis and sputum production.    Cardiovascular: Negative for chest pain, palpitations and orthopnea.   Gastrointestinal: Negative for heartburn, nausea and vomiting.   Genitourinary: Negative for dysuria, flank pain, frequency and hematuria.   Musculoskeletal: Positive for joint pain. Negative for back pain and neck pain.   Skin: Negative for itching and rash.   Neurological: Negative for tremors, speech change, focal weakness and headaches.   Endo/Heme/Allergies: Negative for environmental allergies and polydipsia. Does not bruise/bleed easily.   Psychiatric/Behavioral: Negative for hallucinations and substance abuse. The patient is not nervous/anxious.         Physical Exam  Temp:  [36.2 °C (97.1 °F)-37 °C (98.6 °F)] 36.3 °C (97.3 °F)  Pulse:  [63-79] 66  Resp:  [16-20] 16  BP: (103-149)/(67-97) 142/90  SpO2:  [94 %-100 %] 96 %    Physical Exam  Vitals signs and nursing note reviewed.   Constitutional:       General: She is not in acute distress.     Appearance: Normal appearance.   HENT:      Head: Normocephalic and  atraumatic.      Nose: Nose normal.      Mouth/Throat:      Mouth: Mucous membranes are moist.   Eyes:      Extraocular Movements: Extraocular movements intact.      Pupils: Pupils are equal, round, and reactive to light.   Neck:      Musculoskeletal: Normal range of motion and neck supple.   Cardiovascular:      Rate and Rhythm: Normal rate and regular rhythm.   Pulmonary:      Effort: Pulmonary effort is normal.      Breath sounds: Normal breath sounds.   Abdominal:      General: Abdomen is flat. There is no distension.      Tenderness: There is no abdominal tenderness.   Musculoskeletal:         General: No swelling or deformity.      Right shoulder: She exhibits decreased range of motion and tenderness.   Skin:     General: Skin is warm and dry.   Neurological:      General: No focal deficit present.      Mental Status: She is alert and oriented to person, place, and time.   Psychiatric:         Mood and Affect: Mood normal.         Behavior: Behavior normal.         Fluids    Intake/Output Summary (Last 24 hours) at 3/24/2020 1401  Last data filed at 3/24/2020 0941  Gross per 24 hour   Intake 1440 ml   Output 2 ml   Net 1438 ml       Laboratory  Recent Labs     03/23/20 0135 03/24/20  1037   WBC 10.6 8.3   RBC 4.37 4.20   HEMOGLOBIN 12.9 12.8   HEMATOCRIT 39.8 37.1   MCV 91.1 88.3   MCH 29.5 30.5   MCHC 32.4* 34.5   RDW 44.5 43.3   PLATELETCT 233 193   MPV 9.0 10.1     Recent Labs     03/23/20 0135 03/24/20  1037   SODIUM 133* 136   POTASSIUM 3.8 4.5   CHLORIDE 97 105   CO2 23 20   GLUCOSE 109* 119*   BUN 10 9   CREATININE 0.85 0.63   CALCIUM 8.9 8.7     Recent Labs     03/23/20 0135   INR 1.19*               Imaging  DX-KNEE 3 VIEWS RIGHT   Final Result      1.  Moderate RIGHT knee joint effusion.   2.  No fracture or dislocation.   3.  Minimal degenerative changes.           Assessment/Plan  Pyogenic arthritis of right knee joint (HCC)- (present on admission)  Assessment & Plan  Status post arthrocentesis  in the ER  Fluid cell count reveals WBC count of 49678 predominantly neutrophils which is suggestive of septic arthritis  Started on IV antibiotics  Synovial fluid from 3/24 Gram stain showed white blood cells, no organism  Status post arthroscopic drainage on 3/24  Pain control with Tylenol and oxycodone.  IV morphine for breakthrough pain  Follow-up with culture and sensitivity.  We will follow with ID recommendations      Obesity- (present on admission)  Assessment & Plan  Body mass index is 34.7 kg/m².  Patient has been counseled on diet and lifestyle modifications      IVDU (intravenous drug user)- (present on admission)  Assessment & Plan  Counseled on cessation  Continue methadone    Tobacco use- (present on admission)  Assessment & Plan  Smoking cessation counseling on admission         VTE prophylaxis: Heparin

## 2020-03-24 NOTE — ANESTHESIA PREPROCEDURE EVALUATION
Relevant Problems   PULMONARY   (+) Exercise-induced asthma      NEURO   (+) Headache   (+) Hx of opioid abuse (HCC)      ENDO   (+) Type 2 diabetes mellitus with hyperglycemia (HCC)      OB   (+) Previous  section      Other   (+) Pyogenic arthritis of right knee joint (HCC)       Physical Exam    Airway   Mallampati: II  TM distance: >3 FB  Neck ROM: full       Cardiovascular - normal exam  Rhythm: regular  Rate: normal  (-) murmur     Dental - normal exam         Pulmonary - normal exam  Breath sounds clear to auscultation     Abdominal    Neurological - normal exam         Other findings: Obese, meth abuse, IVDA, smoker, septic right knee            Anesthesia Plan    ASA 3- EMERGENT       Plan - general       Airway plan will be ETT  (Meth abuse, septic knee)      Induction: intravenous    Postoperative Plan: Postoperative administration of opioids is intended.    Pertinent diagnostic labs and testing reviewed    Informed Consent:    Anesthetic plan and risks discussed with patient.    Use of blood products discussed with: patient whom consented to blood products.

## 2020-03-24 NOTE — ANESTHESIA PROCEDURE NOTES
Airway  Date/Time: 3/24/2020 7:50 AM  Performed by: Kurt Haynes III, M.D.  Authorized by: Kurt Haynes III, M.D.     Location:  OR  Urgency:  Elective  Difficult Airway: No    Indications for Airway Management:  Anesthesia  Spontaneous Ventilation: absent    Sedation Level:  Deep  Preoxygenated: Yes    Patient Position:  Sniffing  Final Airway Type:  Endotracheal airway  Final Endotracheal Airway:  ETT  Cuffed: Yes    Technique Used for Successful ETT Placement:  Direct laryngoscopy  Insertion Site:  Oral  Blade Type:  Clay  Laryngoscope Blade/Videolaryngoscope Blade Size:  2  ETT Size (mm):  7.5  Measured from:  Lips  ETT to Lips (cm):  22  Placement Verified by: auscultation and capnometry    Cormack-Lehane Classification:  Grade IIb - view of arytenoids or posterior of glottis only  Number of Attempts at Approach:  1

## 2020-03-24 NOTE — PROGRESS NOTES
Infectious Disease Progress Note    Author: Christian Zarate M.D. Date & Time of service: 3/24/2020  10:53 AM    Chief Complaint:  Follow-up for right knee pain    Interval History:  3/24 afebrile, no CBC today, tolerating vancomycin and ceftriaxone.  Underwent I&D of right knee yesterday.  Fluid cultures with negative Gram stain, growth pending    Labs Reviewed and Medications Reviewed.    Review of Systems:  Review of Systems   Constitutional: Negative for chills, fever and weight loss.   Respiratory: Negative for cough and shortness of breath.    Cardiovascular: Negative for chest pain.   Gastrointestinal: Negative for abdominal pain, diarrhea, nausea and vomiting.   Genitourinary: Negative for dysuria.   Musculoskeletal: Positive for joint pain.   Skin: Negative for itching and rash.   Neurological: Negative for focal weakness and headaches.   All other systems reviewed and are negative.      Hemodynamics:  Temp (24hrs), Av.6 °C (97.9 °F), Min:36.2 °C (97.1 °F), Max:37.1 °C (98.8 °F)  Temperature: 36.2 °C (97.2 °F)  Pulse  Av.7  Min: 67  Max: 104   Blood Pressure: 124/81       Physical Exam:  Physical Exam  Vitals signs and nursing note reviewed.   Constitutional:       General: She is not in acute distress.     Comments: Disheveled appearance  Hirsute   HENT:      Head: Normocephalic and atraumatic.      Mouth/Throat:      Mouth: Mucous membranes are moist.      Pharynx: No oropharyngeal exudate.      Comments: Poor dentition  Eyes:      General: No scleral icterus.        Right eye: No discharge.         Left eye: No discharge.      Conjunctiva/sclera: Conjunctivae normal.      Pupils: Pupils are equal, round, and reactive to light.   Neck:      Musculoskeletal: Neck supple.   Cardiovascular:      Rate and Rhythm: Normal rate and regular rhythm.      Heart sounds: No murmur.   Pulmonary:      Effort: Pulmonary effort is normal. No respiratory distress.      Breath sounds: Normal breath sounds. No  wheezing.   Abdominal:      General: Abdomen is flat. There is no distension.      Palpations: Abdomen is soft.      Tenderness: There is no abdominal tenderness.   Musculoskeletal:         General: Swelling and tenderness present.      Comments: Right knee with dressing in place   Lymphadenopathy:      Cervical: No cervical adenopathy.   Skin:     General: Skin is warm and dry.      Findings: Rash present. No erythema.      Comments: Multiple areas of hyperpigmented macules, papules, some scabs, lower extremity more than upper extremity, no involvement of palms and soles, no active pustules noted   Neurological:      General: No focal deficit present.      Mental Status: She is alert and oriented to person, place, and time.      Cranial Nerves: No cranial nerve deficit.   Psychiatric:         Mood and Affect: Mood normal.         Behavior: Behavior normal.         Meds:    Current Facility-Administered Medications:   •  [DISCONTINUED] senna-docusate **AND** polyethylene glycol/lytes **AND** magnesium hydroxide **AND** bisacodyl  •  Respiratory Therapy Consult  •  LR  •  acetaminophen  •  Notify provider if pain remains uncontrolled **AND** Use the numeric rating scale (NRS-11) on regular floors and Critical-Care Pain Observation Tool (CPOT) on ICUs/Trauma to assess pain **AND** Pulse Ox (Oximetry) **AND** Pharmacy Consult Request **AND** If patient difficult to arouse and/or has respiratory depression, stop any opiates that are currently infusing and call a Rapid Response. **AND** oxyCODONE immediate-release **AND** oxyCODONE immediate-release **AND** morphine injection  •  MD Alert...Vancomycin per Pharmacy  •  cefTRIAXone (ROCEPHIN) IV  •  [COMPLETED] vancomycin **FOLLOWED BY** vancomycin  •  methadone  •  ibuprofen  •  cloNIDine  •  hydrOXYzine HCl  •  loperamide    Labs:  Recent Labs     03/23/20  0135   WBC 10.6   RBC 4.37   HEMOGLOBIN 12.9   HEMATOCRIT 39.8   MCV 91.1   MCH 29.5   RDW 44.5   PLATELETCT 233  "  MPV 9.0   NEUTSPOLYS 64.20   LYMPHOCYTES 27.20   MONOCYTES 7.80   EOSINOPHILS 0.10   BASOPHILS 0.30     Recent Labs     03/23/20 0135   SODIUM 133*   POTASSIUM 3.8   CHLORIDE 97   CO2 23   GLUCOSE 109*   BUN 10     Recent Labs     03/23/20 0135   ALBUMIN 3.8   TBILIRUBIN 2.4*   ALKPHOSPHAT 76   TOTPROTEIN 7.7   ALTSGPT 7   ASTSGOT 10*   CREATININE 0.85       Imaging:  Dx-knee 3 Views Right    Result Date: 3/23/2020  3/23/2020 1:25 PM HISTORY/REASON FOR EXAM:  Atraumatic Pain/Swelling/Deformity. Evaluate for effusion. TECHNIQUE/EXAM DESCRIPTION AND NUMBER OF VIEWS:  2 views of the RIGHT knee. COMPARISON: None FINDINGS: No focal soft tissue swelling. Moderate joint effusion. Joint spaces are preserved. Minimal osteophyte formation at the articular margins. No fracture or dislocation.     1.  Moderate RIGHT knee joint effusion. 2.  No fracture or dislocation. 3.  Minimal degenerative changes.      Micro:  Results     Procedure Component Value Units Date/Time    BLOOD CULTURE [[id number]] Collected:  03/23/20 0135    Order Status:  Completed Specimen:  Blood from Peripheral Updated:  03/24/20 0726     Significant Indicator NEG     Source BLD     Site PERIPHERAL     Culture Result No Growth  Note: Blood cultures are incubated for 5 days and  are monitored continuously.Positive blood cultures  are called to the RN and reported as soon as  they are identified.      Narrative:       1 of 2 for Blood Culture x 2 sites order. Per Hospital  Policy: Only change Specimen Src: to \"Line\" if specified by  physician order.  Right Forearm/Arm    BLOOD CULTURE [[id number]] Collected:  03/23/20 0211    Order Status:  Completed Specimen:  Blood from Peripheral Updated:  03/24/20 0726     Significant Indicator NEG     Source BLD     Site PERIPHERAL     Culture Result No Growth  Note: Blood cultures are incubated for 5 days and  are monitored continuously.Positive blood cultures  are called to the RN and reported as soon as  they are " "identified.      Narrative:       2 of 2 blood culture x2  Sites order. Per Hospital Policy:  Only change Specimen Src: to \"Line\" if specified by physician  order.  Left AC    Chlamydia/GC PCR Urine Or Swab [[id number]] Collected:  03/23/20 1914    Order Status:  Completed Specimen:  Genital Updated:  03/23/20 1919     Source Urine    Narrative:       Collected By:[id number] SHIVANI GILBERT    GRAM STAIN [[id number]] Collected:  03/22/20 2300    Order Status:  Completed Specimen:  Body Fluid Updated:  03/22/20 2359     Significant Indicator .     Source BF     Site OTHER BODY FLUID     Gram Stain Result Many WBCs.  No organisms seen.      FLUID CULTURE W/GRAM STAIN [[id number]] Collected:  03/22/20 2300    Order Status:  Completed Specimen:  Synovial from Other Body Fluid Updated:  03/22/20 2310          Assessment:  Kta Devlin is a 34 y.o. female with active IV heroin use, admitted with right knee septic arthritis.  Patient with recent history of gonorrhea, does have multiple skin lesions, including over sites where patient did not inject heroin.     Pertinent Diagnoses:  Right knee septic arthritis  Syphilis  Disseminated gonococcal infection vs. Syphilitic arthritis  IV heroin use  Hepatitis C antibody positive    Plan:  Right knee septic arthritis  Syphilis  Recent gonorrhea  -RPR positive with a very high titer of 1:256  -Patient also provides history of similar knee swelling about 6 months ago, but on the left side, requiring drainage, resolved without a prolonged course of antibiotics.  Though uncommon, this could possibly represent chronic polyarthritis 2/2 syphilis  -Disseminated gonococcal infection also on the differential given her skin lesions as above.  Follow-up pending urine gonorrhea/chlamydia NAAT  -Follow pending OR cultures  -Discussed with microbiology and pathology.  Will try to obtain immunohistochemistry from the joint fluid if possible, to look for Treponema  -Will stop vancomycin.  Continue IV " ceftriaxone for now  -Received 1 dose of p.o. azithromycin 1 g  -We will go ahead and initiate 2,400,000 units of IM benzathine penicillin weekly x3 for her syphilis    Hepatitis C antibody positive  -Quantitative PCR is pending    IV heroin use  -HIV negative    Discussed with primary, Dr. Robb    ID will follow.  Please call with questions.

## 2020-03-24 NOTE — CARE PLAN
Patient is unable to ambulate due to pain to right knee.  Patient is able to reposition self in bed.

## 2020-03-24 NOTE — ANESTHESIA POSTPROCEDURE EVALUATION
Patient: Kat Devlin    Procedure Summary     Date:  03/24/20 Room / Location:  [place] [address] / SURGERY [facility]    Anesthesia Start:  0744 Anesthesia Stop:  0830    Procedure:  ARTHROSCOPY, KNEE (Right Knee) Diagnosis:  (Bacterial arthritis of right knee )    Surgeon:  Jorge Paulino M.D. Responsible Provider:  Kurt Haynes III, M.D.    Anesthesia Type:  general ASA Status:  3 - Emergent          Final Anesthesia Type: general  Last vitals  BP   Blood Pressure: 149/92    Temp   36.3 °C (97.4 °F)    Pulse   Pulse: 69   Resp   18    SpO2   94 %      Anesthesia Post Evaluation    Patient location during evaluation: PACU  Patient participation: complete - patient participated  Level of consciousness: awake and alert  Pain score: 5    Airway patency: patent  Anesthetic complications: no  Cardiovascular status: hemodynamically stable  Respiratory status: acceptable  Hydration status: euvolemic    PONV: none           Nurse Pain Score: 5 (NPRS)

## 2020-03-24 NOTE — CARE PLAN
Problem: Communication  Goal: The ability to communicate needs accurately and effectively will improve  Outcome: PROGRESSING AS EXPECTED     Problem: Safety  Goal: Will remain free from injury  Outcome: PROGRESSING AS EXPECTED  Goal: Will remain free from falls  Outcome: PROGRESSING AS EXPECTED     Problem: Infection  Goal: Will remain free from infection  Outcome: PROGRESSING AS EXPECTED     Problem: Bowel/Gastric:  Goal: Normal bowel function is maintained or improved  Outcome: PROGRESSING AS EXPECTED  Goal: Will not experience complications related to bowel motility  Outcome: PROGRESSING AS EXPECTED     Problem: Pain Management  Goal: Pain level will decrease to patient's comfort goal  Outcome: PROGRESSING SLOWER THAN EXPECTED     Problem: Psychosocial Needs:  Goal: Level of anxiety will decrease  Outcome: PROGRESSING SLOWER THAN EXPECTED

## 2020-03-24 NOTE — OP REPORT
DATE OF SERVICE:  03/24/2020    PREOPERATIVE DIAGNOSIS:  Right pyogenic arthritis of the knee.    POSTOPERATIVE DIAGNOSIS:  Right pyogenic arthritis of the knee.    PROCEDURE PERFORMED:  Arthroscopic lavage of right knee.    SURGEON:  Jorge Paulino MD    ASSISTANT:  None.    ANESTHESIA:  General.    ESTIMATED BLOOD LOSS:  Less than 5 mL.    COMPLICATIONS:  None.    OPERATIVE INDICATIONS:  The patient is a 34-year-old female who has a history   of IV drug abuse.  Her right knee started swelling in the last few days after   injecting this area.  Previous aspiration shows 77,000 white cells.  Based on   this, this is consistent with an infection of the knee.  We discussed this and   talked to her about treatment options.  Recommended arthroscopic lavage,   decreased risk of ongoing infection as well as continue antibiotics.  We will   repeat cultures as well.  Discussed the risks of surgery to include wound   healing complications, infection, knee pain and stiffness, possible need for   additional procedures in the future.  She understood these risks and agreed,   she wished to proceed.    PROCEDURE IN DETAIL:  The patient was seen in the preoperative holding on date   of surgery.  The right knee was marked with surgeon's initials.  She was   taken to the operating room and placed supine on the operative table.    Anesthesia was induced.  The right lower extremity was prepped and draped in   normal sterile fashion.  Operative pause was conducted.  Correct patient,   site, side, and procedure were identified as well as surgeon's initials on the   operative extremity.  At this point, an anterolateral portal was made and   once the trocar was inserted, the fluid from inside the knee was sampled for   culture.  We then inserted the arthroscope and turned on the water.  A   superolateral drainage portal was placed.  We then ran 6 liters of normal   saline into the knee, making sure to move the knee throughout the  procedure,   as well as go to all corners of the knee to make sure all areas were   thoroughly irrigated.  She had a lot of synovitis in the knee, some grade I   and grade II degenerative changes at the knee, but there is visualized ACL to   be intact as well as both menisci.    Once all fluid was irrigated through the knee and no signs of any additional   infectious material was seen, the arthroscope was removed.  The knee was   drained and then the wound closed with 3-0 nylon.  Sterile dressings were   placed.  The patient was allowed to awaken in the operating room, taken to   PACU in stable condition.    POSTOPERATIVE PLAN:  She is weightbearing as tolerated, range of motion as   tolerated to the right knee.  We will continue to follow cultures and direct   antibiotics toward these.  We will have her followup in 2 weeks' time for   suture removal.       ____________________________________     MD TUSHAR Garcia / AKIRA    DD:  03/24/2020 08:32:44  DT:  03/24/2020 10:00:19    D#:  [id number]  Job#:  [id number]

## 2020-03-24 NOTE — PROGRESS NOTES
Patient refused morning labs. She complained that she had not gotten any sleep. I tried to speak to her about getting her labs done prior to surgery but she ignored me.

## 2020-03-24 NOTE — PROGRESS NOTES
Patient's jewelry is on too tight and could not be removed prior to leaving for pre-op. Called and let nurses in pre-op know patient still had jewelery on.

## 2020-03-24 NOTE — ANESTHESIA QCDR
2019 [facility] Clinical Data Registry (for Quality Improvement)     Postoperative nausea/vomiting risk protocol (Adult = 18 yrs and Pediatric 3-17 yrs)- (430 and 463)  General inhalation anesthetic (NOT TIVA) with PONV risk factors: Yes  Provision of anti-emetic therapy with at least 2 different classes of agents: Yes   Patient DID NOT receive anti-emetic therapy and reason is documented in Medical Record:  N/A    Multimodal Pain Management- (477)  Non-emergent surgery AND patient age >= 18: No  Use of Multimodal Pain Management, two or more drugs and/or interventions, NOT including systemic opioids:   Exception: Documented allergy to multiple classes of analgesics:     Smoking Abstinence (404)  Patient is current smoker (cigarette, pipe, e-cig, marijuanna): Yes  Elective Surgery: No  Abstinence instructions provided prior to day of surgery:   Patient abstained from smoking on day of surgery:     Pre-Op Beta-Blocker in Isolated CABG (44)  Isolated CABG AND patient age >= 18: No  Beta-blocker admin within 24 hours of surgical incision:   Exception:of medical reason(s) for not administering beta blocker within 24 hours prior to surgical incision (e.g., not  indicated,other medical reason):     PACU assessment of acute postoperative pain prior to Anesthesia Care End- Applies to Patients Age = 18- (ABG7)  Initial PACU pain score is which of the following: < 7/10  Patient unable to report pain score: N/A    Post-anesthetic transfer of care checklist/protocol to PACU/ICU- (426 and 427)  Upon conclusion of case, patient transferred to which of the following locations: PACU/Non-ICU  Use of transfer checklist/protocol: Yes  Exclusion: Service Performed in Patient Hospital Room (and thus did not require transfer): N/A  Unplanned admission to ICU related to anesthesia service up through end of PACU care- (MD51)  Unplanned admission to ICU (not initially anticipated at anesthesia start time): No

## 2020-03-24 NOTE — OR NURSING
Respirations easy,  ACE wrap clean and dry to right knee.  Ice to knee.  Right leg elevated.  Palpable DP pulse, toes warm and mobile with brisk cap refill.  Nailbeds pink.

## 2020-03-25 PROBLEM — A53.9 SYPHILIS: Status: ACTIVE | Noted: 2020-03-25

## 2020-03-25 LAB
ALBUMIN SERPL BCP-MCNC: 3.1 G/DL (ref 3.2–4.9)
ALBUMIN/GLOB SERPL: 0.8 G/DL
ALP SERPL-CCNC: 64 U/L (ref 30–99)
ALT SERPL-CCNC: 6 U/L (ref 2–50)
ANION GAP SERPL CALC-SCNC: 12 MMOL/L (ref 7–16)
AST SERPL-CCNC: 5 U/L (ref 12–45)
BASOPHILS # BLD AUTO: 0.2 % (ref 0–1.8)
BASOPHILS # BLD: 0.02 K/UL (ref 0–0.12)
BILIRUB SERPL-MCNC: 0.8 MG/DL (ref 0.1–1.5)
BUN SERPL-MCNC: 10 MG/DL (ref 8–22)
CALCIUM SERPL-MCNC: 8.8 MG/DL (ref 8.5–10.5)
CHLORIDE SERPL-SCNC: 106 MMOL/L (ref 96–112)
CO2 SERPL-SCNC: 22 MMOL/L (ref 20–33)
CREAT SERPL-MCNC: 0.7 MG/DL (ref 0.5–1.4)
EOSINOPHIL # BLD AUTO: 0.02 K/UL (ref 0–0.51)
EOSINOPHIL NFR BLD: 0.2 % (ref 0–6.9)
ERYTHROCYTE [DISTWIDTH] IN BLOOD BY AUTOMATED COUNT: 40.9 FL (ref 35.9–50)
GLOBULIN SER CALC-MCNC: 3.9 G/DL (ref 1.9–3.5)
GLUCOSE SERPL-MCNC: 97 MG/DL (ref 65–99)
HCT VFR BLD AUTO: 33.8 % (ref 37–47)
HCV RNA SERPL NAA+PROBE-ACNC: ABNORMAL K[IU]/ML
HCV RNA SERPL NAA+PROBE-LOG IU: 5.56 LOG IU/ML
HCV RNA SERPL QL NAA+PROBE: DETECTED
HGB BLD-MCNC: 11.7 G/DL (ref 12–16)
IMM GRANULOCYTES # BLD AUTO: 0.03 K/UL (ref 0–0.11)
IMM GRANULOCYTES NFR BLD AUTO: 0.3 % (ref 0–0.9)
LYMPHOCYTES # BLD AUTO: 3.33 K/UL (ref 1–4.8)
LYMPHOCYTES NFR BLD: 33.8 % (ref 22–41)
MCH RBC QN AUTO: 30 PG (ref 27–33)
MCHC RBC AUTO-ENTMCNC: 34.6 G/DL (ref 33.6–35)
MCV RBC AUTO: 86.7 FL (ref 81.4–97.8)
MONOCYTES # BLD AUTO: 0.64 K/UL (ref 0–0.85)
MONOCYTES NFR BLD AUTO: 6.5 % (ref 0–13.4)
NEUTROPHILS # BLD AUTO: 5.82 K/UL (ref 2–7.15)
NEUTROPHILS NFR BLD: 59 % (ref 44–72)
NRBC # BLD AUTO: 0 K/UL
NRBC BLD-RTO: 0 /100 WBC
PLATELET # BLD AUTO: 227 K/UL (ref 164–446)
PMV BLD AUTO: 9.3 FL (ref 9–12.9)
POTASSIUM SERPL-SCNC: 3.7 MMOL/L (ref 3.6–5.5)
PROT SERPL-MCNC: 7 G/DL (ref 6–8.2)
RBC # BLD AUTO: 3.9 M/UL (ref 4.2–5.4)
SODIUM SERPL-SCNC: 140 MMOL/L (ref 135–145)
WBC # BLD AUTO: 9.9 K/UL (ref 4.8–10.8)

## 2020-03-25 PROCEDURE — 770006 HCHG ROOM/CARE - MED/SURG/GYN SEMI*

## 2020-03-25 PROCEDURE — 700102 HCHG RX REV CODE 250 W/ 637 OVERRIDE(OP): Performed by: HOSPITALIST

## 2020-03-25 PROCEDURE — 700111 HCHG RX REV CODE 636 W/ 250 OVERRIDE (IP): Performed by: INTERNAL MEDICINE

## 2020-03-25 PROCEDURE — 80053 COMPREHEN METABOLIC PANEL: CPT

## 2020-03-25 PROCEDURE — 99232 SBSQ HOSP IP/OBS MODERATE 35: CPT | Performed by: INTERNAL MEDICINE

## 2020-03-25 PROCEDURE — 700102 HCHG RX REV CODE 250 W/ 637 OVERRIDE(OP): Performed by: INTERNAL MEDICINE

## 2020-03-25 PROCEDURE — A9270 NON-COVERED ITEM OR SERVICE: HCPCS | Performed by: INTERNAL MEDICINE

## 2020-03-25 PROCEDURE — 85025 COMPLETE CBC W/AUTO DIFF WBC: CPT

## 2020-03-25 PROCEDURE — 36415 COLL VENOUS BLD VENIPUNCTURE: CPT

## 2020-03-25 PROCEDURE — 700105 HCHG RX REV CODE 258: Performed by: INTERNAL MEDICINE

## 2020-03-25 PROCEDURE — A9270 NON-COVERED ITEM OR SERVICE: HCPCS | Performed by: HOSPITALIST

## 2020-03-25 RX ADMIN — CEFTRIAXONE SODIUM 2 G: 2 INJECTION, POWDER, FOR SOLUTION INTRAMUSCULAR; INTRAVENOUS at 05:10

## 2020-03-25 RX ADMIN — ACETAMINOPHEN 650 MG: 325 TABLET, FILM COATED ORAL at 00:44

## 2020-03-25 RX ADMIN — HYDROXYZINE HYDROCHLORIDE 10 MG: 10 TABLET, FILM COATED ORAL at 05:30

## 2020-03-25 RX ADMIN — IBUPROFEN 600 MG: 600 TABLET ORAL at 09:49

## 2020-03-25 RX ADMIN — ENOXAPARIN SODIUM 40 MG: 100 INJECTION SUBCUTANEOUS at 05:10

## 2020-03-25 RX ADMIN — OXYCODONE HYDROCHLORIDE 10 MG: 10 TABLET ORAL at 17:40

## 2020-03-25 RX ADMIN — POLYETHYLENE GLYCOL 3350 1 PACKET: 17 POWDER, FOR SOLUTION ORAL at 20:40

## 2020-03-25 RX ADMIN — IBUPROFEN 600 MG: 600 TABLET ORAL at 17:40

## 2020-03-25 RX ADMIN — OXYCODONE HYDROCHLORIDE 10 MG: 10 TABLET ORAL at 00:44

## 2020-03-25 RX ADMIN — OXYCODONE HYDROCHLORIDE 10 MG: 10 TABLET ORAL at 09:50

## 2020-03-25 RX ADMIN — OXYCODONE HYDROCHLORIDE 10 MG: 10 TABLET ORAL at 20:40

## 2020-03-25 RX ADMIN — OXYCODONE HYDROCHLORIDE 10 MG: 10 TABLET ORAL at 14:23

## 2020-03-25 RX ADMIN — METHADONE HYDROCHLORIDE 20 MG: 10 TABLET ORAL at 05:10

## 2020-03-25 ASSESSMENT — ENCOUNTER SYMPTOMS
HEMOPTYSIS: 0
BACK PAIN: 0
DOUBLE VISION: 0
TREMORS: 0
COUGH: 0
NAUSEA: 0
PHOTOPHOBIA: 0
BLURRED VISION: 0
SPUTUM PRODUCTION: 0
FEVER: 0
SPEECH CHANGE: 0
FOCAL WEAKNESS: 0
HALLUCINATIONS: 0
NERVOUS/ANXIOUS: 0
ORTHOPNEA: 0
VOMITING: 0
PALPITATIONS: 0
POLYDIPSIA: 0
WEIGHT LOSS: 0
NECK PAIN: 0
HEADACHES: 0
BRUISES/BLEEDS EASILY: 0
CHILLS: 0
HEARTBURN: 0
FLANK PAIN: 0

## 2020-03-25 ASSESSMENT — LIFESTYLE VARIABLES: SUBSTANCE_ABUSE: 0

## 2020-03-25 NOTE — PROGRESS NOTES
"Patient refused to walk when RN offered to get her out of bed. Patient stated she was in too much pain and \"scared\".  "

## 2020-03-25 NOTE — PROGRESS NOTES
"   Orthopaedic Progress Note    Interval changes:  Cultures NGTD  Cleared for DC by ortho pending medicine and ID team clearance    ROS - Patient denies any new issues.  Pain well controlled.    /80   Pulse 83   Temp 37.2 °C (99 °F) (Temporal)   Resp 16   Ht 1.753 m (5' 9\")   Wt 105 kg (231 lb 7.7 oz)   SpO2 95%       Patient seen and examined  No acute distress  Breathing non labored  RRR  RLE Surgical dressing is clean, dry, and intact. Patient clearly fires tibialis anterior, EHL, and gastrocnemius/soleus. Sensation is intact to light touch throughout superficial peroneal, deep peroneal, tibial, saphenous, and sural nerve distributions. Strong and palpable 2+ dorsalis pedis and posterior tibial pulses with capillary refill less than 2 seconds. No lower leg tenderness or discomfort.       Recent Labs     03/23/20  0135 03/24/20  1037 03/25/20  0530   WBC 10.6 8.3 9.9   RBC 4.37 4.20 3.90*   HEMOGLOBIN 12.9 12.8 11.7*   HEMATOCRIT 39.8 37.1 33.8*   MCV 91.1 88.3 86.7   MCH 29.5 30.5 30.0   MCHC 32.4* 34.5 34.6   RDW 44.5 43.3 40.9   PLATELETCT 233 193 227   MPV 9.0 10.1 9.3       Active Hospital Problems    Diagnosis   • Pyogenic arthritis of right knee joint (HCC) [M00.9]     Priority: High   • Obesity [E66.9]     Priority: Medium     Nutrition on  Needs outpatient weight loss     • Tobacco use [Z72.0]     Priority: Low     Nicotine replacement PRN  Strongly encouraged to quit  Counseled at least 8 minutes on 02/08/2016     • Syphilis [A53.9]   • IVDU (intravenous drug user) [F19.90]       Assessment/Plan:  Cleared for DC by ortho pending medicine and ID team clearance  POD#1 S/P Arthroscopic lavage of right knee  Wt bearing status - WBAT  Wound care/Drains - dressing changed every other day by nursing  Future Procedures - none planned   Lovenox: Start 3/25, Duration-until ambulatory > 150'  Sutures/Staples out- 10-14 days post operatively  PT/OT-initiated  Antibiotics: rocephin 2g IV QD, bicillin-LA " 2.4 mil units   DVT Prophylaxis- TEDS/SCDs/Foot pumps  Birch-none  Case Coordination for Discharge Planning - Disposition pending abx needs

## 2020-03-25 NOTE — CARE PLAN
Problem: Pain Management  Goal: Pain level will decrease to patient's comfort goal  Outcome: PROGRESSING AS EXPECTED   Patient reports adequate pain control. Requesting rest as current method of pain control.     Problem: Mobility  Goal: Risk for activity intolerance will decrease  Outcome: PROGRESSING SLOWER THAN EXPECTED  Patient fatigued and refusing mobilization. Education provided.

## 2020-03-25 NOTE — PROGRESS NOTES
Hospital Medicine Daily Progress Note    Date of Service  3/25/2020    Chief Complaint/Hospital Course  34 y.o. female with a past medical history of IV drug use, tobacco abuse, who presented 3/22/2020 with right knee pain , admitted with septic arthritis of right knee, undergone arthroscopic lavage of right knee on 3/20        Interval Problem Update  Patient is sleeping without signs of distress.  Upon awakening, denies new issues.  Pain at the moment is well controlled.  Afebrile, vital stable.  ID input noted  I discussed POC with the patient and nursing staff  Consultants/Specialty  Orthopedic surgery     Code Status  Full code    Disposition  Be discussed    Review of Systems  Review of Systems   Constitutional: Negative for chills, fever and weight loss.   HENT: Negative for ear pain, hearing loss and tinnitus.    Eyes: Negative for blurred vision, double vision and photophobia.   Respiratory: Negative for cough, hemoptysis and sputum production.    Cardiovascular: Negative for chest pain, palpitations and orthopnea.   Gastrointestinal: Negative for heartburn, nausea and vomiting.   Genitourinary: Negative for dysuria, flank pain, frequency and hematuria.   Musculoskeletal: Positive for joint pain. Negative for back pain and neck pain.   Skin: Negative for itching and rash.   Neurological: Negative for tremors, speech change, focal weakness and headaches.   Endo/Heme/Allergies: Negative for environmental allergies and polydipsia. Does not bruise/bleed easily.   Psychiatric/Behavioral: Negative for hallucinations and substance abuse. The patient is not nervous/anxious.         Physical Exam  Temp:  [36.2 °C (97.2 °F)-37.7 °C (99.8 °F)] 37.3 °C (99.2 °F)  Pulse:  [63-86] 79  Resp:  [16-20] 18  BP: (107-149)/(70-93) 126/77  SpO2:  [94 %-98 %] 94 %    Physical Exam  Vitals signs and nursing note reviewed.   Constitutional:       General: She is not in acute distress.     Appearance: Normal appearance.   HENT:       Head: Normocephalic and atraumatic.      Nose: Nose normal.      Mouth/Throat:      Mouth: Mucous membranes are moist.   Eyes:      Extraocular Movements: Extraocular movements intact.      Pupils: Pupils are equal, round, and reactive to light.   Neck:      Musculoskeletal: Normal range of motion and neck supple.   Cardiovascular:      Rate and Rhythm: Normal rate and regular rhythm.   Pulmonary:      Effort: Pulmonary effort is normal.      Breath sounds: Normal breath sounds.   Abdominal:      General: Abdomen is flat. There is no distension.      Tenderness: There is no abdominal tenderness.   Musculoskeletal:         General: No swelling or deformity.      Right knee: She exhibits swelling and effusion.      Comments: Right knee covered with sterile dressing   Skin:     General: Skin is warm and dry.      Comments: Multiple scattered hyperpigmented spots   Neurological:      General: No focal deficit present.      Mental Status: She is alert and oriented to person, place, and time.   Psychiatric:         Mood and Affect: Mood normal.         Behavior: Behavior normal.       I examined the patient on 3/25.  No significant changes from 3/24 noted except as documented above  Fluids    Intake/Output Summary (Last 24 hours) at 3/25/2020 0936  Last data filed at 3/24/2020 1500  Gross per 24 hour   Intake 200 ml   Output 700 ml   Net -500 ml       Laboratory  Recent Labs     03/23/20  0135 03/24/20  1037 03/25/20  0530   WBC 10.6 8.3 9.9   RBC 4.37 4.20 3.90*   HEMOGLOBIN 12.9 12.8 11.7*   HEMATOCRIT 39.8 37.1 33.8*   MCV 91.1 88.3 86.7   MCH 29.5 30.5 30.0   MCHC 32.4* 34.5 34.6   RDW 44.5 43.3 40.9   PLATELETCT 233 193 227   MPV 9.0 10.1 9.3     Recent Labs     03/23/20  0135 03/24/20  1037 03/25/20  0530   SODIUM 133* 136 140   POTASSIUM 3.8 4.5 3.7   CHLORIDE 97 105 106   CO2 23 20 22   GLUCOSE 109* 119* 97   BUN 10 9 10   CREATININE 0.85 0.63 0.70   CALCIUM 8.9 8.7 8.8     Recent Labs     03/23/20  0135   INR  1.19*               Imaging  DX-KNEE 3 VIEWS RIGHT   Final Result      1.  Moderate RIGHT knee joint effusion.   2.  No fracture or dislocation.   3.  Minimal degenerative changes.           Assessment/Plan  Pyogenic arthritis of right knee joint (HCC)- (present on admission)  Assessment & Plan  Status post arthrocentesis in the ER  Fluid cell count reveals WBC count of 11696 predominantly neutrophils which is suggestive of septic arthritis  Started on IV antibiotics  Synovial fluid from 3/24 Gram stain showed white blood cells, no organism  Status post arthroscopic drainage on 3/24  Pain control with Tylenol and oxycodone.  IV morphine for breakthrough pain  Follow-up with culture and sensitivity.  Urine gonorrhea/chlamydia screen negative      Obesity- (present on admission)  Assessment & Plan  Body mass index is 34.7 kg/m².  Patient has been counseled on diet and lifestyle modifications      Syphilis  Assessment & Plan  RPR positive with a very high titer of 1:256  Treatment plan per ID    IVDU (intravenous drug user)- (present on admission)  Assessment & Plan  Counseled on cessation  Continue methadone    Tobacco use- (present on admission)  Assessment & Plan  Smoking cessation counseling on admission         VTE prophylaxis: Heparin

## 2020-03-25 NOTE — ASSESSMENT & PLAN NOTE
RPR positive with a very high titer of 1:256  Treatment plan per ID, weekly intramuscular penicillin, to continue as outpatient at [facility] after discharge

## 2020-03-26 LAB
ANION GAP SERPL CALC-SCNC: 12 MMOL/L (ref 7–16)
BACTERIA FLD AEROBE CULT: NORMAL
BASOPHILS # BLD AUTO: 0.4 % (ref 0–1.8)
BASOPHILS # BLD: 0.03 K/UL (ref 0–0.12)
BUN SERPL-MCNC: 10 MG/DL (ref 8–22)
CALCIUM SERPL-MCNC: 8.9 MG/DL (ref 8.5–10.5)
CHLORIDE SERPL-SCNC: 102 MMOL/L (ref 96–112)
CO2 SERPL-SCNC: 23 MMOL/L (ref 20–33)
CREAT SERPL-MCNC: 0.73 MG/DL (ref 0.5–1.4)
EOSINOPHIL # BLD AUTO: 0.1 K/UL (ref 0–0.51)
EOSINOPHIL NFR BLD: 1.4 % (ref 0–6.9)
ERYTHROCYTE [DISTWIDTH] IN BLOOD BY AUTOMATED COUNT: 41.7 FL (ref 35.9–50)
GLUCOSE SERPL-MCNC: 89 MG/DL (ref 65–99)
GRAM STN SPEC: NORMAL
HCT VFR BLD AUTO: 34.9 % (ref 37–47)
HGB BLD-MCNC: 11.8 G/DL (ref 12–16)
IMM GRANULOCYTES # BLD AUTO: 0.03 K/UL (ref 0–0.11)
IMM GRANULOCYTES NFR BLD AUTO: 0.4 % (ref 0–0.9)
LYMPHOCYTES # BLD AUTO: 3.1 K/UL (ref 1–4.8)
LYMPHOCYTES NFR BLD: 43.3 % (ref 22–41)
MCH RBC QN AUTO: 29.9 PG (ref 27–33)
MCHC RBC AUTO-ENTMCNC: 33.8 G/DL (ref 33.6–35)
MCV RBC AUTO: 88.4 FL (ref 81.4–97.8)
MONOCYTES # BLD AUTO: 0.53 K/UL (ref 0–0.85)
MONOCYTES NFR BLD AUTO: 7.4 % (ref 0–13.4)
NEUTROPHILS # BLD AUTO: 3.37 K/UL (ref 2–7.15)
NEUTROPHILS NFR BLD: 47.1 % (ref 44–72)
NRBC # BLD AUTO: 0 K/UL
NRBC BLD-RTO: 0 /100 WBC
PLATELET # BLD AUTO: 246 K/UL (ref 164–446)
PMV BLD AUTO: 8.7 FL (ref 9–12.9)
POTASSIUM SERPL-SCNC: 3.7 MMOL/L (ref 3.6–5.5)
RBC # BLD AUTO: 3.95 M/UL (ref 4.2–5.4)
SIGNIFICANT IND 70042: NORMAL
SITE SITE: NORMAL
SODIUM SERPL-SCNC: 137 MMOL/L (ref 135–145)
SOURCE SOURCE: NORMAL
WBC # BLD AUTO: 7.2 K/UL (ref 4.8–10.8)

## 2020-03-26 PROCEDURE — 700102 HCHG RX REV CODE 250 W/ 637 OVERRIDE(OP): Performed by: INTERNAL MEDICINE

## 2020-03-26 PROCEDURE — A9270 NON-COVERED ITEM OR SERVICE: HCPCS | Performed by: HOSPITALIST

## 2020-03-26 PROCEDURE — 36415 COLL VENOUS BLD VENIPUNCTURE: CPT

## 2020-03-26 PROCEDURE — A9270 NON-COVERED ITEM OR SERVICE: HCPCS | Performed by: INTERNAL MEDICINE

## 2020-03-26 PROCEDURE — 700111 HCHG RX REV CODE 636 W/ 250 OVERRIDE (IP): Performed by: INTERNAL MEDICINE

## 2020-03-26 PROCEDURE — 99231 SBSQ HOSP IP/OBS SF/LOW 25: CPT | Performed by: INTERNAL MEDICINE

## 2020-03-26 PROCEDURE — 770006 HCHG ROOM/CARE - MED/SURG/GYN SEMI*

## 2020-03-26 PROCEDURE — 99232 SBSQ HOSP IP/OBS MODERATE 35: CPT | Performed by: INTERNAL MEDICINE

## 2020-03-26 PROCEDURE — 700102 HCHG RX REV CODE 250 W/ 637 OVERRIDE(OP): Performed by: HOSPITALIST

## 2020-03-26 PROCEDURE — 51798 US URINE CAPACITY MEASURE: CPT

## 2020-03-26 PROCEDURE — 700105 HCHG RX REV CODE 258: Performed by: INTERNAL MEDICINE

## 2020-03-26 PROCEDURE — 85025 COMPLETE CBC W/AUTO DIFF WBC: CPT

## 2020-03-26 PROCEDURE — 80048 BASIC METABOLIC PNL TOTAL CA: CPT

## 2020-03-26 RX ORDER — METHADONE HYDROCHLORIDE 10 MG/1
10 TABLET ORAL DAILY
Status: DISCONTINUED | OUTPATIENT
Start: 2020-03-27 | End: 2020-03-28 | Stop reason: HOSPADM

## 2020-03-26 RX ADMIN — IBUPROFEN 600 MG: 600 TABLET ORAL at 12:54

## 2020-03-26 RX ADMIN — CEFTRIAXONE SODIUM 2 G: 2 INJECTION, POWDER, FOR SOLUTION INTRAMUSCULAR; INTRAVENOUS at 05:32

## 2020-03-26 RX ADMIN — ENOXAPARIN SODIUM 40 MG: 100 INJECTION SUBCUTANEOUS at 06:21

## 2020-03-26 RX ADMIN — OXYCODONE HYDROCHLORIDE 10 MG: 10 TABLET ORAL at 19:47

## 2020-03-26 RX ADMIN — HYDROXYZINE HYDROCHLORIDE 10 MG: 10 TABLET, FILM COATED ORAL at 05:32

## 2020-03-26 RX ADMIN — IBUPROFEN 600 MG: 600 TABLET ORAL at 17:22

## 2020-03-26 RX ADMIN — OXYCODONE HYDROCHLORIDE 10 MG: 10 TABLET ORAL at 16:40

## 2020-03-26 RX ADMIN — OXYCODONE HYDROCHLORIDE 10 MG: 10 TABLET ORAL at 12:57

## 2020-03-26 RX ADMIN — IBUPROFEN 600 MG: 600 TABLET ORAL at 10:05

## 2020-03-26 RX ADMIN — METHADONE HYDROCHLORIDE 20 MG: 10 TABLET ORAL at 06:20

## 2020-03-26 ASSESSMENT — LIFESTYLE VARIABLES: SUBSTANCE_ABUSE: 0

## 2020-03-26 ASSESSMENT — ENCOUNTER SYMPTOMS
TREMORS: 0
SPUTUM PRODUCTION: 0
ABDOMINAL PAIN: 0
PHOTOPHOBIA: 0
COUGH: 0
FOCAL WEAKNESS: 0
DIARRHEA: 0
HEMOPTYSIS: 0
FEVER: 0
BLURRED VISION: 0
HEADACHES: 0
BRUISES/BLEEDS EASILY: 0
PALPITATIONS: 0
HALLUCINATIONS: 0
FLANK PAIN: 0
POLYDIPSIA: 0
NERVOUS/ANXIOUS: 0
NAUSEA: 0
HEARTBURN: 0
ORTHOPNEA: 0
DOUBLE VISION: 0
SHORTNESS OF BREATH: 0
BACK PAIN: 0
SPEECH CHANGE: 0
NECK PAIN: 0
CHILLS: 0
WEIGHT LOSS: 0
VOMITING: 0

## 2020-03-26 NOTE — PROGRESS NOTES
"Pt refused to ambulate. Per pt \"I don't want to walk. I am too tired to walk right now.\" Education provided, will attempt to ambulate again in 1 hour  "

## 2020-03-26 NOTE — CARE PLAN
Problem: Communication  Goal: The ability to communicate needs accurately and effectively will improve  Outcome: PROGRESSING AS EXPECTED   Patient communicates needs effectively to staff.     Problem: Mobility  Goal: Risk for activity intolerance will decrease  Outcome: PROGRESSING SLOWER THAN EXPECTED  Patient fatigued and refusing mobilization. Education provided.

## 2020-03-26 NOTE — CARE PLAN
Problem: Bowel/Gastric:  Goal: Normal bowel function is maintained or improved  Outcome: PROGRESSING SLOWER THAN EXPECTED  Note: Last BM on 3/20, following bowel protocol, bowel sounds hypoactive x4 quadrants. Will continue to monitor     Problem: Mobility  Goal: Risk for activity intolerance will decrease  Outcome: PROGRESSING SLOWER THAN EXPECTED  Note: Educated pt on importance of mobilization, encourage pt to use bathroom instead of bedpan

## 2020-03-26 NOTE — PROGRESS NOTES
"   Orthopaedic Progress Note    Interval changes:  Cultures NGTD  Cleared for DC by ortho pending medicine and ID team clearance    ROS - Patient denies any new issues.  Pain well controlled.    /78   Pulse 72   Temp 37.6 °C (99.7 °F) (Temporal)   Resp 16   Ht 1.753 m (5' 9\")   Wt 105 kg (231 lb 7.7 oz)   SpO2 92%       Patient seen and examined  No acute distress  Breathing non labored  RRR  RLE Surgical dressing is clean, dry, and intact. Patient clearly fires tibialis anterior, EHL, and gastrocnemius/soleus. Sensation is intact to light touch throughout superficial peroneal, deep peroneal, tibial, saphenous, and sural nerve distributions. Strong and palpable 2+ dorsalis pedis and posterior tibial pulses with capillary refill less than 2 seconds. No lower leg tenderness or discomfort.       Recent Labs     03/24/20  1037 03/25/20  0530 03/26/20  1106   WBC 8.3 9.9 7.2   RBC 4.20 3.90* 3.95*   HEMOGLOBIN 12.8 11.7* 11.8*   HEMATOCRIT 37.1 33.8* 34.9*   MCV 88.3 86.7 88.4   MCH 30.5 30.0 29.9   MCHC 34.5 34.6 33.8   RDW 43.3 40.9 41.7   PLATELETCT 193 227 246   MPV 10.1 9.3 8.7*       Active Hospital Problems    Diagnosis   • Pyogenic arthritis of right knee joint (HCC) [M00.9]     Priority: High   • Obesity [E66.9]     Priority: Medium     Nutrition on  Needs outpatient weight loss     • Tobacco use [Z72.0]     Priority: Low     Nicotine replacement PRN  Strongly encouraged to quit  Counseled at least 8 minutes on 02/08/2016     • Syphilis [A53.9]   • IVDU (intravenous drug user) [F19.90]       Assessment/Plan:  Cleared for DC by ortho pending medicine and ID team clearance  POD#2 S/P Arthroscopic lavage of right knee  Wt bearing status - WBAT  Wound care/Drains - dressing changed every other day by nursing  Future Procedures - none planned   Lovenox: Start 3/25, Duration-until ambulatory > 150'  Sutures/Staples out- 10-14 days post operatively  PT/OT-initiated  Antibiotics: rocephin 2g IV QD, " bicillin-LA 2.4 mil units   DVT Prophylaxis- TEDS/SCDs/Foot pumps  Birch-none  Case Coordination for Discharge Planning - Disposition pending abx needs

## 2020-03-26 NOTE — PROGRESS NOTES
Infectious Disease Progress Note    Author: Christian Zarate M.D. Date & Time of service: 3/26/2020  2:35 PM    Chief Complaint:  Follow-up for right knee pain    Interval History:  3/24 afebrile, no CBC today, tolerating vancomycin and ceftriaxone.  Underwent I&D of right knee yesterday.  Fluid cultures with negative Gram stain, growth pending  3/26 afebrile, white count 7.2, notes improvement in the right knee pain today.  Cultures no growth till date.    Labs Reviewed and Medications Reviewed.    Review of Systems:  Review of Systems   Constitutional: Negative for chills, fever and weight loss.   Respiratory: Negative for cough and shortness of breath.    Cardiovascular: Negative for chest pain.   Gastrointestinal: Negative for abdominal pain, diarrhea, nausea and vomiting.   Genitourinary: Negative for dysuria.   Musculoskeletal: Positive for joint pain.   Skin: Negative for itching and rash.   Neurological: Negative for focal weakness and headaches.   All other systems reviewed and are negative.      Hemodynamics:  Temp (24hrs), Av.2 °C (98.9 °F), Min:36.6 °C (97.9 °F), Max:37.6 °C (99.7 °F)  Temperature: 37.6 °C (99.7 °F)  Pulse  Av.2  Min: 63  Max: 104   Blood Pressure: 119/78       Physical Exam:  Physical Exam  Vitals signs and nursing note reviewed.   Constitutional:       General: She is not in acute distress.     Comments: Disheveled appearance  Hirsute   HENT:      Head: Normocephalic and atraumatic.      Mouth/Throat:      Mouth: Mucous membranes are moist.      Pharynx: No oropharyngeal exudate.      Comments: Poor dentition  Eyes:      General: No scleral icterus.        Right eye: No discharge.         Left eye: No discharge.      Conjunctiva/sclera: Conjunctivae normal.      Pupils: Pupils are equal, round, and reactive to light.   Neck:      Musculoskeletal: Neck supple.   Cardiovascular:      Rate and Rhythm: Normal rate and regular rhythm.      Heart sounds: No murmur.   Pulmonary:       Effort: Pulmonary effort is normal. No respiratory distress.      Breath sounds: Normal breath sounds. No wheezing.   Abdominal:      General: Abdomen is flat. There is no distension.      Palpations: Abdomen is soft.      Tenderness: There is no abdominal tenderness.   Musculoskeletal:         General: Swelling and tenderness present.      Comments: Right knee with dressing in place   Lymphadenopathy:      Cervical: No cervical adenopathy.   Skin:     General: Skin is warm and dry.      Findings: Rash present. No erythema.      Comments: Multiple areas of hyperpigmented macules, papules, some scabs, lower extremity more than upper extremity, no involvement of palms and soles, no active pustules noted   Neurological:      General: No focal deficit present.      Mental Status: She is alert and oriented to person, place, and time.      Cranial Nerves: No cranial nerve deficit.   Psychiatric:         Mood and Affect: Mood normal.         Behavior: Behavior normal.         Meds:    Current Facility-Administered Medications:   •  [START ON 3/27/2020] methadone  •  enoxaparin (LOVENOX) injection  •  penicillin G benzathine  •  [DISCONTINUED] senna-docusate **AND** polyethylene glycol/lytes **AND** magnesium hydroxide **AND** bisacodyl  •  Respiratory Therapy Consult  •  LR  •  acetaminophen  •  Notify provider if pain remains uncontrolled **AND** Use the numeric rating scale (NRS-11) on regular floors and Critical-Care Pain Observation Tool (CPOT) on ICUs/Trauma to assess pain **AND** Pulse Ox (Oximetry) **AND** Pharmacy Consult Request **AND** If patient difficult to arouse and/or has respiratory depression, stop any opiates that are currently infusing and call a Rapid Response. **AND** oxyCODONE immediate-release **AND** oxyCODONE immediate-release **AND** morphine injection  •  cefTRIAXone (ROCEPHIN) IV  •  ibuprofen  •  cloNIDine  •  hydrOXYzine HCl  •  loperamide    Labs:  Recent Labs     03/24/20  1037  03/25/20  0530 03/26/20  1106   WBC 8.3 9.9 7.2   RBC 4.20 3.90* 3.95*   HEMOGLOBIN 12.8 11.7* 11.8*   HEMATOCRIT 37.1 33.8* 34.9*   MCV 88.3 86.7 88.4   MCH 30.5 30.0 29.9   RDW 43.3 40.9 41.7   PLATELETCT 193 227 246   MPV 10.1 9.3 8.7*   NEUTSPOLYS 78.00* 59.00 47.10   LYMPHOCYTES 17.80* 33.80 43.30*   MONOCYTES 2.40 6.50 7.40   EOSINOPHILS 1.00 0.20 1.40   BASOPHILS 0.10 0.20 0.40     Recent Labs     03/24/20  1037 03/25/20  0530 03/26/20  1106   SODIUM 136 140 137   POTASSIUM 4.5 3.7 3.7   CHLORIDE 105 106 102   CO2 20 22 23   GLUCOSE 119* 97 89   BUN 9 10 10     Recent Labs     03/24/20  1037 03/25/20  0530 03/26/20  1106   ALBUMIN  --  3.1*  --    TBILIRUBIN  --  0.8  --    ALKPHOSPHAT  --  64  --    TOTPROTEIN  --  7.0  --    ALTSGPT  --  6  --    ASTSGOT  --  5*  --    CREATININE 0.63 0.70 0.73       Imaging:  Dx-knee 3 Views Right    Result Date: 3/23/2020  3/23/2020 1:25 PM HISTORY/REASON FOR EXAM:  Atraumatic Pain/Swelling/Deformity. Evaluate for effusion. TECHNIQUE/EXAM DESCRIPTION AND NUMBER OF VIEWS:  2 views of the RIGHT knee. COMPARISON: None FINDINGS: No focal soft tissue swelling. Moderate joint effusion. Joint spaces are preserved. Minimal osteophyte formation at the articular margins. No fracture or dislocation.     1.  Moderate RIGHT knee joint effusion. 2.  No fracture or dislocation. 3.  Minimal degenerative changes.      Micro:  Results     Procedure Component Value Units Date/Time    Anaerobic Culture [[id number]] Collected:  03/24/20 0806    Order Status:  Completed Specimen:  Synovial Updated:  03/26/20 1130     Significant Indicator NEG     Source SYNO     Site Right Knee     Culture Result Culture in progress.    Narrative:       Surgery Specimen    FLUID CULTURE W/GRAM STAIN [[id number]] Collected:  03/24/20 0806    Order Status:  Completed Specimen:  Synovial Updated:  03/26/20 1130     Significant Indicator NEG     Source SYNO     Site Right Knee     Culture Result No growth at 48  "hours.     Gram Stain Result Moderate WBCs.  No organisms seen.      Narrative:       Surgery Specimen    FLUID CULTURE W/GRAM STAIN [[id number]] Collected:  03/22/20 2300    Order Status:  Completed Specimen:  Synovial from Other Body Fluid Updated:  03/26/20 1023     Significant Indicator NEG     Source SYNO     Site Right Knee     Culture Result No growth at 72 hours.     Gram Stain Result Many WBCs.  No organisms seen.      Chlamydia/GC PCR Urine Or Swab [[id number]] Collected:  03/23/20 1914    Order Status:  Completed Specimen:  Genital Updated:  03/24/20 1616     C. trachomatis by PCR Negative     N. gonorrhoeae by PCR Negative     Source Urine    Narrative:       Collected By:[id number] SHIVANI GILBERT    GRAM STAIN [[id number]] Collected:  03/24/20 0806    Order Status:  Completed Specimen:  Synovial Updated:  03/24/20 1155     Significant Indicator .     Source SYNO     Site Right Knee     Gram Stain Result Moderate WBCs.  No organisms seen.      Narrative:       Surgery Specimen    BLOOD CULTURE [[id number]] Collected:  03/23/20 0135    Order Status:  Completed Specimen:  Blood from Peripheral Updated:  03/24/20 0726     Significant Indicator NEG     Source BLD     Site PERIPHERAL     Culture Result No Growth  Note: Blood cultures are incubated for 5 days and  are monitored continuously.Positive blood cultures  are called to the RN and reported as soon as  they are identified.      Narrative:       1 of 2 for Blood Culture x 2 sites order. Per Hospital  Policy: Only change Specimen Src: to \"Line\" if specified by  physician order.  Right Forearm/Arm    BLOOD CULTURE [[id number]] Collected:  03/23/20 0211    Order Status:  Completed Specimen:  Blood from Peripheral Updated:  03/24/20 0726     Significant Indicator NEG     Source BLD     Site PERIPHERAL     Culture Result No Growth  Note: Blood cultures are incubated for 5 days and  are monitored continuously.Positive blood cultures  are called to the RN and reported " "as soon as  they are identified.      Narrative:       2 of 2 blood culture x2  Sites order. Per Hospital Policy:  Only change Specimen Src: to \"Line\" if specified by physician  order.  Left AC    GRAM STAIN [[id number]] Collected:  03/22/20 2300    Order Status:  Completed Specimen:  Body Fluid Updated:  03/22/20 5979     Significant Indicator .     Source BF     Site OTHER BODY FLUID     Gram Stain Result Many WBCs.  No organisms seen.            Assessment:  Kat Devlin is a 34 y.o. female with active IV heroin use, admitted with right knee septic arthritis.  Patient with recent history of gonorrhea, does have multiple skin lesions, including over sites where patient did not inject heroin.     Pertinent Diagnoses:  Right knee septic arthritis  Syphilis  Disseminated gonococcal infection vs. Syphilitic arthritis  IV heroin use  Hepatitis C antibody positive    Plan:  Right knee septic arthritis  Syphilis  Recent gonorrhea  -RPR positive with a very high titer of 1:256  -Patient also provides history of similar knee swelling about 6 months ago, but on the left side, requiring drainage, resolved without a prolonged course of antibiotics.  Though uncommon, this could possibly represent chronic polyarthritis 2/2 syphilis  -Disseminated gonococcal infection also on the differential given her skin lesions as above.  Follow-up pending urine gonorrhea/chlamydia NAAT  -Follow pending OR cultures  -Discussed with microbiology and pathology. Unable to obtain immunohistochemistry from the joint fluid to look for Treponema  -Continue IV ceftriaxone for now  -Received 1 dose of p.o. azithromycin 1 g  -We will go ahead and initiate 2,400,000 units of IM benzathine penicillin weekly x3 for her syphilis  Plan:  -Continue IV ceftriaxone 2 g every 24 hours for 7 days with a stop date of 3/20/2020  -This will cover possibility of gonococcal arthritis. However, her repeat urine gonorrhea NAAT is negative.  Fluid cultures are no " growth till date.  Given possibility of a different bacterial organism, following IV ceftriaxone, start p.o. doxycycline 100 mg BID + Augmentin 875 mg BID x 7 days  -Complete her course of IM penicillin for Syphilis: 2,400,000 units of IM benzathine penicillin weekly x3 (received first dose on 3/24)    Hepatitis C  -Positive quantitative PCR.  Consider treatment outpatient    IV heroin use  -HIV negative    Discussed with primary, Dr. Robb    ID will sign off.  Please call with questions.    ID clinic follow-up in 2 weeks.  Okay to cancel appointment if resolved

## 2020-03-26 NOTE — CARE PLAN
Problem: Communication  Goal: The ability to communicate needs accurately and effectively will improve  Outcome: PROGRESSING AS EXPECTED     Problem: Safety  Goal: Will remain free from injury  Outcome: PROGRESSING AS EXPECTED  Goal: Will remain free from falls  Outcome: PROGRESSING AS EXPECTED     Problem: Infection  Goal: Will remain free from infection  Outcome: PROGRESSING AS EXPECTED     Problem: Pain Management  Goal: Pain level will decrease to patient's comfort goal  Outcome: PROGRESSING AS EXPECTED     Problem: Respiratory:  Goal: Respiratory status will improve  Outcome: PROGRESSING AS EXPECTED     Problem: Bowel/Gastric:  Goal: Normal bowel function is maintained or improved  Outcome: PROGRESSING SLOWER THAN EXPECTED  Goal: Will not experience complications related to bowel motility  Outcome: PROGRESSING SLOWER THAN EXPECTED     Problem: Knowledge Deficit  Goal: Knowledge of disease process/condition, treatment plan, diagnostic tests, and medications will improve  Outcome: PROGRESSING SLOWER THAN EXPECTED  Goal: Knowledge of the prescribed therapeutic regimen will improve  Outcome: PROGRESSING SLOWER THAN EXPECTED     Problem: Psychosocial Needs:  Goal: Level of anxiety will decrease  Outcome: PROGRESSING SLOWER THAN EXPECTED

## 2020-03-26 NOTE — PROGRESS NOTES
Knee feels better today.  She is able to stand and ambulate on it to the bathroom.  Pain at rest is minimal.    Vitals:    03/25/20 1600   BP: 123/77   Pulse: 70   Resp: 16   Temp: 37.2 °C (99 °F)   SpO2: 94%       Dressings clean dry and intact.    Culture still with no growth to date      Plan:  Continue antibiotics for pyogenic arthritis.  May consider atypical infection such as gonococcal infection given the no growth from initial cultures.  Recent gonococcal infection seen 2 weeks ago  Weightbearing and range of motion as tolerated  Mobilize  Dressing changes as needed.  2-week follow-up for suture removal

## 2020-03-26 NOTE — PROGRESS NOTES
Hospital Medicine Daily Progress Note    Date of Service  3/26/2020    Chief Complaint/Hospital Course  34 y.o. female with a past medical history of IV drug use, tobacco abuse, who presented 3/22/2020 with right knee pain , admitted with septic arthritis of right knee, undergone arthroscopic lavage of right knee on 3/20        Interval Problem Update    Patient is again sleepy this morning.  I will reduce dose of methadone to 10 mg twice daily.  No growth from surgical culture up to date.  Temperature subfebrile 99.7 max      Consultants/Specialty  Orthopedic surgery     Code Status  Full code    Disposition  To be discussed, pending final cultures and sensitivities    Review of Systems  Review of Systems   Constitutional: Negative for chills, fever and weight loss.   HENT: Negative for ear pain, hearing loss and tinnitus.    Eyes: Negative for blurred vision, double vision and photophobia.   Respiratory: Negative for cough, hemoptysis and sputum production.    Cardiovascular: Negative for chest pain, palpitations and orthopnea.   Gastrointestinal: Negative for heartburn, nausea and vomiting.   Genitourinary: Negative for dysuria, flank pain, frequency and hematuria.   Musculoskeletal: Positive for joint pain. Negative for back pain and neck pain.   Skin: Negative for itching and rash.   Neurological: Negative for tremors, speech change, focal weakness and headaches.   Endo/Heme/Allergies: Negative for environmental allergies and polydipsia. Does not bruise/bleed easily.   Psychiatric/Behavioral: Negative for hallucinations and substance abuse. The patient is not nervous/anxious.         Physical Exam  Temp:  [36.6 °C (97.9 °F)-37.6 °C (99.7 °F)] 37.6 °C (99.7 °F)  Pulse:  [70-98] 72  Resp:  [16] 16  BP: (107-138)/(69-87) 119/78  SpO2:  [92 %-95 %] 92 %    Physical Exam  Vitals signs and nursing note reviewed.   Constitutional:       General: She is not in acute distress.     Appearance: Normal appearance.   HENT:       Head: Normocephalic and atraumatic.      Nose: Nose normal.      Mouth/Throat:      Mouth: Mucous membranes are moist.   Eyes:      Extraocular Movements: Extraocular movements intact.      Pupils: Pupils are equal, round, and reactive to light.   Neck:      Musculoskeletal: Normal range of motion and neck supple.   Cardiovascular:      Rate and Rhythm: Normal rate and regular rhythm.   Pulmonary:      Effort: Pulmonary effort is normal.      Breath sounds: Normal breath sounds.   Abdominal:      General: Abdomen is flat. There is no distension.      Tenderness: There is no abdominal tenderness.   Musculoskeletal:         General: No swelling or deformity.      Comments: Right lower extremity covered with sterile dressing   Skin:     General: Skin is warm and dry.      Comments: Hyperpigmented skin lesion, multiple, scattered   Neurological:      General: No focal deficit present.      Mental Status: She is alert and oriented to person, place, and time.   Psychiatric:         Mood and Affect: Mood normal.         Behavior: Behavior normal.      Comments: Somnolent       I examined the patient on 3/26.  No significant changes from 3/25 noted except as documented above  Fluids  No intake or output data in the 24 hours ending 03/26/20 0949    Laboratory  Recent Labs     03/24/20  1037 03/25/20  0530   WBC 8.3 9.9   RBC 4.20 3.90*   HEMOGLOBIN 12.8 11.7*   HEMATOCRIT 37.1 33.8*   MCV 88.3 86.7   MCH 30.5 30.0   MCHC 34.5 34.6   RDW 43.3 40.9   PLATELETCT 193 227   MPV 10.1 9.3     Recent Labs     03/24/20  1037 03/25/20  0530   SODIUM 136 140   POTASSIUM 4.5 3.7   CHLORIDE 105 106   CO2 20 22   GLUCOSE 119* 97   BUN 9 10   CREATININE 0.63 0.70   CALCIUM 8.7 8.8                   Imaging  DX-KNEE 3 VIEWS RIGHT   Final Result      1.  Moderate RIGHT knee joint effusion.   2.  No fracture or dislocation.   3.  Minimal degenerative changes.           Assessment/Plan  Pyogenic arthritis of right knee joint (HCC)-  (present on admission)  Assessment & Plan  Status post arthrocentesis in the ER  Fluid cell count reveals WBC count of 98559 predominantly neutrophils which is suggestive of septic arthritis  Started on IV antibiotics  Synovial fluid from 3/24 Gram stain showed white blood cells, no organism  Status post arthroscopic drainage on 3/24  Pain control with Tylenol and oxycodone.  IV morphine for breakthrough pain  Follow-up with culture and sensitivity.  Urine gonorrhea/chlamydia screen negative      Obesity- (present on admission)  Assessment & Plan  Body mass index is 34.7 kg/m².  Patient has been counseled on diet and lifestyle modifications      Syphilis  Assessment & Plan  RPR positive with a very high titer of 1:256  Treatment plan per ID    IVDU (intravenous drug user)- (present on admission)  Assessment & Plan  Counseled on cessation  Continue methadone; reduce dose to 10 mg twice daily 3/26 due to somnolence    Tobacco use- (present on admission)  Assessment & Plan  Smoking cessation counseling on admission         VTE prophylaxis: Heparin

## 2020-03-27 LAB
BACTERIA FLD AEROBE CULT: NORMAL
GRAM STN SPEC: NORMAL
SIGNIFICANT IND 70042: NORMAL
SITE SITE: NORMAL
SOURCE SOURCE: NORMAL

## 2020-03-27 PROCEDURE — 99232 SBSQ HOSP IP/OBS MODERATE 35: CPT | Performed by: INTERNAL MEDICINE

## 2020-03-27 PROCEDURE — 770006 HCHG ROOM/CARE - MED/SURG/GYN SEMI*

## 2020-03-27 PROCEDURE — 700102 HCHG RX REV CODE 250 W/ 637 OVERRIDE(OP): Performed by: INTERNAL MEDICINE

## 2020-03-27 PROCEDURE — 97162 PT EVAL MOD COMPLEX 30 MIN: CPT

## 2020-03-27 PROCEDURE — A9270 NON-COVERED ITEM OR SERVICE: HCPCS | Performed by: INTERNAL MEDICINE

## 2020-03-27 PROCEDURE — A9270 NON-COVERED ITEM OR SERVICE: HCPCS | Performed by: HOSPITALIST

## 2020-03-27 PROCEDURE — 97165 OT EVAL LOW COMPLEX 30 MIN: CPT

## 2020-03-27 PROCEDURE — 700111 HCHG RX REV CODE 636 W/ 250 OVERRIDE (IP): Performed by: INTERNAL MEDICINE

## 2020-03-27 PROCEDURE — 700105 HCHG RX REV CODE 258: Performed by: INTERNAL MEDICINE

## 2020-03-27 PROCEDURE — 700102 HCHG RX REV CODE 250 W/ 637 OVERRIDE(OP): Performed by: HOSPITALIST

## 2020-03-27 RX ORDER — DOXYCYCLINE 100 MG/1
100 CAPSULE ORAL 2 TIMES DAILY
Qty: 14 CAP | Refills: 0 | Status: SHIPPED | OUTPATIENT
Start: 2020-03-27 | End: 2020-04-03

## 2020-03-27 RX ORDER — AMOXICILLIN AND CLAVULANATE POTASSIUM 875; 125 MG/1; MG/1
1 TABLET, FILM COATED ORAL 2 TIMES DAILY
Qty: 14 TAB | Refills: 0 | Status: SHIPPED | OUTPATIENT
Start: 2020-03-27 | End: 2020-04-03

## 2020-03-27 RX ADMIN — OXYCODONE HYDROCHLORIDE 10 MG: 10 TABLET ORAL at 02:03

## 2020-03-27 RX ADMIN — HYDROXYZINE HYDROCHLORIDE 10 MG: 10 TABLET, FILM COATED ORAL at 06:15

## 2020-03-27 RX ADMIN — MAGNESIUM HYDROXIDE 30 ML: 400 SUSPENSION ORAL at 06:15

## 2020-03-27 RX ADMIN — IBUPROFEN 600 MG: 600 TABLET ORAL at 07:19

## 2020-03-27 RX ADMIN — CEFTRIAXONE SODIUM 2 G: 2 INJECTION, POWDER, FOR SOLUTION INTRAMUSCULAR; INTRAVENOUS at 06:14

## 2020-03-27 RX ADMIN — METHADONE HYDROCHLORIDE 10 MG: 10 TABLET ORAL at 06:15

## 2020-03-27 RX ADMIN — ENOXAPARIN SODIUM 40 MG: 100 INJECTION SUBCUTANEOUS at 06:15

## 2020-03-27 RX ADMIN — OXYCODONE HYDROCHLORIDE 10 MG: 10 TABLET ORAL at 13:33

## 2020-03-27 RX ADMIN — IBUPROFEN 600 MG: 600 TABLET ORAL at 17:17

## 2020-03-27 RX ADMIN — IBUPROFEN 600 MG: 600 TABLET ORAL at 13:33

## 2020-03-27 ASSESSMENT — COGNITIVE AND FUNCTIONAL STATUS - GENERAL
CLIMB 3 TO 5 STEPS WITH RAILING: A LITTLE
MOVING FROM LYING ON BACK TO SITTING ON SIDE OF FLAT BED: A LITTLE
MOVING TO AND FROM BED TO CHAIR: A LITTLE
MOBILITY SCORE: 18
DRESSING REGULAR LOWER BODY CLOTHING: A LITTLE
SUGGESTED CMS G CODE MODIFIER DAILY ACTIVITY: CJ
SUGGESTED CMS G CODE MODIFIER MOBILITY: CK
HELP NEEDED FOR BATHING: A LITTLE
WALKING IN HOSPITAL ROOM: A LITTLE
DAILY ACTIVITIY SCORE: 22
TURNING FROM BACK TO SIDE WHILE IN FLAT BAD: A LITTLE
STANDING UP FROM CHAIR USING ARMS: A LITTLE

## 2020-03-27 ASSESSMENT — ENCOUNTER SYMPTOMS
NAUSEA: 0
SPEECH CHANGE: 0
BRUISES/BLEEDS EASILY: 0
HEMOPTYSIS: 0
PHOTOPHOBIA: 0
BACK PAIN: 0
WEIGHT LOSS: 0
HEARTBURN: 0
BLURRED VISION: 0
HALLUCINATIONS: 0
PALPITATIONS: 0
NERVOUS/ANXIOUS: 0
ORTHOPNEA: 0
TREMORS: 0
FEVER: 0
HEADACHES: 0
COUGH: 0
NECK PAIN: 0
FLANK PAIN: 0
POLYDIPSIA: 0
FOCAL WEAKNESS: 0
VOMITING: 0
SPUTUM PRODUCTION: 0
DOUBLE VISION: 0
CHILLS: 0

## 2020-03-27 ASSESSMENT — ACTIVITIES OF DAILY LIVING (ADL): TOILETING: INDEPENDENT

## 2020-03-27 ASSESSMENT — GAIT ASSESSMENTS
GAIT LEVEL OF ASSIST: SUPERVISED
DEVIATION: ANTALGIC;OTHER (COMMENT)
ASSISTIVE DEVICE: FRONT WHEEL WALKER
DISTANCE (FEET): 300

## 2020-03-27 ASSESSMENT — LIFESTYLE VARIABLES: SUBSTANCE_ABUSE: 0

## 2020-03-27 NOTE — DISCHARGE INSTRUCTIONS
Discharge Patient Start: 03/27/20 0716, End: 03/27/20 0716, ONCE, ROUTINE     Comments: DC tomorrow 3/28 after last IV injection of ceftriaxone   Follow with [facility] for weekly IM penicillin   F/u with ortho and ID clinic   Question: Against Medical Advice Answer: No     Penicillin G Procaine injection  What is this medicine?  PENICILLIN G PROCAINE (pen i SILL in G proh KEYN) is a penicillin antibiotic. It is used to treat certain kinds of bacterial infections. It will not work for colds, flu, or other viral infections.  This medicine may be used for other purposes; ask your health care provider or pharmacist if you have questions.  COMMON BRAND NAME(S): Wycillin  What should I tell my health care provider before I take this medicine?  They need to know if you have any of these conditions:  -asthma  -kidney disease  -an unusual or allergic reaction to penicillin, procaine, other medicines, foods, dyes, or preservatives  -pregnant or trying to get pregnant  -breast-feeding  How should I use this medicine?  This medicine is for injection into a muscle. It is usually given by a health care professional in a hospital or clinic setting.  Talk to your pediatrician regarding the use of this medicine in children. While this drug may be prescribed for selected conditions, precautions do apply.  Overdosage: If you think you have taken too much of this medicine contact a poison control center or emergency room at once.  NOTE: This medicine is only for you. Do not share this medicine with others.  What if I miss a dose?  This does not apply.  What may interact with this medicine?  -aspirin  -birth control pills  -diuretics  -ethacrynic acid  -indomethacin  -methotrexate  -phenylbutazone  -probenecid  -some antibiotics like chloramphenicol, erythromycin, sulfamethoxazole, tetracycline  -typhoid vaccine  This list may not describe all possible interactions. Give your health care provider a list of all the medicines, herbs,  non-prescription drugs, or dietary supplements you use. Also tell them if you smoke, drink alcohol, or use illegal drugs. Some items may interact with your medicine.  What should I watch for while using this medicine?  Tell your doctor or healthcare professional if your symptoms do not start to get better or if they get worse.  Do not treat diarrhea with over the counter products. Contact your doctor if you have diarrhea that lasts more than 2 days or if it is severe and watery.  This medicine can interfere with some urine glucose tests. If you use such tests, talk with your health care professional.  What side effects may I notice from receiving this medicine?  Side effects that you should report to your doctor or health care professional as soon as possible:  -allergic reactions like skin rash, itching or hives, swelling of the face, lips, or tongue  -breathing problems  -dark urine  -fever with headache, flushing  -muscle cramps  -pain or difficulty passing urine    Amoxicillin; Clavulanic Acid tablets  What is this medicine?  AMOXICILLIN; CLAVULANIC ACID (a mox i RUDDY in; ROXY ramsey ic AS id) is a penicillin antibiotic. It is used to treat certain kinds of bacterial infections. It will not work for colds, flu, or other viral infections.  This medicine may be used for other purposes; ask your health care provider or pharmacist if you have questions.  COMMON BRAND NAME(S): Augmentin  What should I tell my health care provider before I take this medicine?  They need to know if you have any of these conditions:  -bowel disease, like colitis  -kidney disease  -liver disease  -mononucleosis  -an unusual or allergic reaction to amoxicillin, penicillin, cephalosporin, other antibiotics, clavulanic acid, other medicines, foods, dyes, or preservatives  -pregnant or trying to get pregnant  -breast-feeding  How should I use this medicine?  Take this medicine by mouth with a full glass of water. Follow the directions on the  prescription label. Take at the start of a meal. Do not crush or chew. If the tablet has a score line, you may cut it in half at the score line for easier swallowing. Take your medicine at regular intervals. Do not take your medicine more often than directed. Take all of your medicine as directed even if you think you are better. Do not skip doses or stop your medicine early.  Talk to your pediatrician regarding the use of this medicine in children. Special care may be needed.  Overdosage: If you think you have taken too much of this medicine contact a poison control center or emergency room at once.  NOTE: This medicine is only for you. Do not share this medicine with others.  What if I miss a dose?  If you miss a dose, take it as soon as you can. If it is almost time for your next dose, take only that dose. Do not take double or extra doses.  What may interact with this medicine?  -allopurinol  -anticoagulants  -birth control pills  -methotrexate  -probenecid  This list may not describe all possible interactions. Give your health care provider a list of all the medicines, herbs, non-prescription drugs, or dietary supplements you use. Also tell them if you smoke, drink alcohol, or use illegal drugs. Some items may interact with your medicine.  What should I watch for while using this medicine?  Tell your doctor or health care professional if your symptoms do not improve.  Do not treat diarrhea with over the counter products. Contact your doctor if you have diarrhea that lasts more than 2 days or if it is severe and watery.  If you have diabetes, you may get a false-positive result for sugar in your urine. Check with your doctor or health care professional.  Birth control pills may not work properly while you are taking this medicine. Talk to your doctor about using an extra method of birth control.  What side effects may I notice from receiving this medicine?  Side effects that you should report to your doctor or  health care professional as soon as possible:  -allergic reactions like skin rash, itching or hives, swelling of the face, lips, or tongue  -breathing problems  -dark urine  -fever or chills, sore throat  -redness, blistering, peeling or loosening of the skin, including inside the mouth  -seizures  -trouble passing urine or change in the amount of urine  -unusual bleeding, bruising  -unusually weak or tired  -white patches or sores in the mouth or throat  Side effects that usually do not require medical attention (report to your doctor or health care professional if they continue or are bothersome):  -diarrhea  -dizziness  -headache  -nausea, vomiting  -stomach upset  -vaginal or anal irritation  This list may not describe all possible side effects. Call your doctor for medical advice about side effects. You may report side effects to FDA at 2-791-FDA-5336.  Where should I keep my medicine?  Keep out of the reach of children.  Store at room temperature below 25 degrees C (77 degrees F). Keep container tightly closed. Throw away any unused medicine after the expiration date.  NOTE: This sheet is a summary. It may not cover all possible information. If you have questions about this medicine, talk to your doctor, pharmacist, or health care provider.  © 2018 Elsevier/Gold Standard (2009-03-12 12:04:30)    -red spots on the skin  -redness, blistering, peeling or loosening of the skin, including inside the mouth  -seizures  -unusual bleeding or bruising  -unusually weak or tired  Side effects that usually do not require medical attention (report to your doctor or health care professional if they continue or are bothersome):  -diarrhea  -dizziness  -headache  -pain at site where injected  -stomach upset  This list may not describe all possible side effects. Call your doctor for medical advice about side effects. You may report side effects to FDA at 3-581-FDA-5086.  Where should I keep my medicine?  This drug is given in a  hospital or clinic and will not be stored at home.  NOTE: This sheet is a summary. It may not cover all possible information. If you have questions about this medicine, talk to your doctor, pharmacist, or health care provider.  © 2018 Elsevier/Gold Standard (2009-07-24 10:56:05)    Doxycycline tablets or capsules  What is this medicine?  DOXYCYCLINE (dox ree pedersen) is a tetracycline antibiotic. It kills certain bacteria or stops their growth. It is used to treat many kinds of infections, like dental, skin, respiratory, and urinary tract infections. It also treats acne, Lyme disease, malaria, and certain sexually transmitted infections.  This medicine may be used for other purposes; ask your health care provider or pharmacist if you have questions.  COMMON BRAND NAME(S): Acticlate, Adoxa, Adoxa CK, Adoxa Chris, Adoxa TT, Alodox, Avidoxy, Doxal, Mondoxyne NL, Monodox, Morgidox 1x, Morgidox 1x Kit, Morgidox 2x, Morgidox 2x Kit, NutriDox, Ocudox, TARGADOX, Vibra-Tabs, Vibramycin  What should I tell my health care provider before I take this medicine?  They need to know if you have any of these conditions:  -liver disease  -long exposure to sunlight like working outdoors  -stomach problems like colitis  -an unusual or allergic reaction to doxycycline, tetracycline antibiotics, other medicines, foods, dyes, or preservatives  -pregnant or trying to get pregnant  -breast-feeding  How should I use this medicine?  Take this medicine by mouth with a full glass of water. Follow the directions on the prescription label. It is best to take this medicine without food, but if it upsets your stomach take it with food. Take your medicine at regular intervals. Do not take your medicine more often than directed. Take all of your medicine as directed even if you think you are better. Do not skip doses or stop your medicine early.  Talk to your pediatrician regarding the use of this medicine in children. While this drug may be prescribed  for selected conditions, precautions do apply.  Overdosage: If you think you have taken too much of this medicine contact a poison control center or emergency room at once.  NOTE: This medicine is only for you. Do not share this medicine with others.  What if I miss a dose?  If you miss a dose, take it as soon as you can. If it is almost time for your next dose, take only that dose. Do not take double or extra doses.  What may interact with this medicine?  -antacids  -barbiturates  -birth control pills  -bismuth subsalicylate  -carbamazepine  -methoxyflurane  -other antibiotics  -phenytoin  -vitamins that contain iron  -warfarin  This list may not describe all possible interactions. Give your health care provider a list of all the medicines, herbs, non-prescription drugs, or dietary supplements you use. Also tell them if you smoke, drink alcohol, or use illegal drugs. Some items may interact with your medicine.  What should I watch for while using this medicine?  Tell your doctor or health care professional if your symptoms do not improve.  Do not treat diarrhea with over the counter products. Contact your doctor if you have diarrhea that lasts more than 2 days or if it is severe and watery.  Do not take this medicine just before going to bed. It may not dissolve properly when you lay down and can cause pain in your throat. Drink plenty of fluids while taking this medicine to also help reduce irritation in your throat.  This medicine can make you more sensitive to the sun. Keep out of the sun. If you cannot avoid being in the sun, wear protective clothing and use sunscreen. Do not use sun lamps or tanning beds/booths.  Birth control pills may not work properly while you are taking this medicine. Talk to your doctor about using an extra method of birth control.  If you are being treated for a sexually transmitted infection, avoid sexual contact until you have finished your treatment. Your sexual partner may also need  treatment.  Avoid antacids, aluminum, calcium, magnesium, and iron products for 4 hours before and 2 hours after taking a dose of this medicine.  If you are using this medicine to prevent malaria, you should still protect yourself from contact with mosquitos. Stay in screened-in areas, use mosquito nets, keep your body covered, and use an insect repellent.  What side effects may I notice from receiving this medicine?  Side effects that you should report to your doctor or health care professional as soon as possible:  -allergic reactions like skin rash, itching or hives, swelling of the face, lips, or tongue  -difficulty breathing  -fever  -itching in the rectal or genital area  -pain on swallowing  -redness, blistering, peeling or loosening of the skin, including inside the mouth  -severe stomach pain or cramps  -unusual bleeding or bruising  -unusually weak or tired  -yellowing of the eyes or skin  Side effects that usually do not require medical attention (report to your doctor or health care professional if they continue or are bothersome):  -diarrhea  -loss of appetite  -nausea, vomiting  This list may not describe all possible side effects. Call your doctor for medical advice about side effects. You may report side effects to FDA at 6-351-FDA-1286.  Where should I keep my medicine?  Keep out of the reach of children.  Store at room temperature, below 30 degrees C (86 degrees F). Protect from light. Keep container tightly closed. Throw away any unused medicine after the expiration date. Taking this medicine after the expiration date can make you seriously ill.  NOTE: This sheet is a summary. It may not cover all possible information. If you have questions about this medicine, talk to your doctor, pharmacist, or health care provider.  © 2018 Elsevier/Gold Standard (2017-01-18 17:11:22)

## 2020-03-27 NOTE — PROGRESS NOTES
Spoke with Carlo MELCHOR requests dressing change be done with gauze and tegaderm today, d/w patient she is refusing at this time and wants to sleep, request notify RN when ready for dressing change.

## 2020-03-27 NOTE — THERAPY
"Occupational Therapy Evaluation completed.   Functional Status: spv for toileting, spv for standing grooming at sink, Solitario for LE dressing  Plan of Care: Patient with no further skilled OT needs in the acute care setting at this time  Discharge Recommendations:  Equipment: Will Continue to Assess for Equipment Needs. Post-acute therapy Anticipate that the patient will have no further occupational therapy needs after discharge from the hospital. Patient will not be actively followed for occupational therapy services at this time, however may be seen if requested by physician for 1 more visit within 30 days to address any discharge or equipment needs.     Pt is a 34 year old female with PMHx including but not limited to IV drug use, tobacco abuse, who presented 3/22 with right knee pain for the past 2 days. Pt found to have pyogenic arthritis of the right knee joint and underwent arthroscopic lavage of the R knee (WBAT). Pt is homeless and reports being independent prior. Pt currently [place] for bed mobility, spv for toileting, spv for standing grooming at sink with FWW, and Solitario for LE dressing (to finish donning R sock). Pt presents with impaired balance and activity tolerance, however still completing ADLs WFL. No further acute occupational therapy required at this time. Will follow for d/c needs only.    See \"Rehab Therapy-Acute\" Patient Summary Report for complete documentation.    "

## 2020-03-27 NOTE — DISCHARGE PLANNING
Medicaid Meds-to-Beds: Discharge prescription orders listed below delivered to patient's bedside and given to ALBINO Cash to lock in patient drawer. Patient counseled.       Devlin, Kat   Home Medication Instructions SAL:[id number]    Printed on:03/27/20 1055   Medication Information                      amoxicillin-clavulanate (AUGMENTIN) 875-125 MG Tab  Take 1 Tab by mouth 2 times a day for 7 days.             doxycycline (MONODOX) 100 MG capsule  Take 1 Cap by mouth 2 times a day for 7 days.               Elsi Sandhu, PharmD

## 2020-03-27 NOTE — PROGRESS NOTES
Hospital Medicine Daily Progress Note    Date of Service  3/27/2020    Chief Complaint/Hospital Course  34 y.o. female with a past medical history of IV drug use, tobacco abuse, who presented 3/22/2020 with right knee pain , admitted with septic arthritis of right knee, undergone arthroscopic lavage of right knee on 3/20        Interval Problem Update    Patient is up to chair, is no signs of distress.  Pain is under control.  Plan of care discussed with the patient and RN in details.  Likely discharge tomorrow with follow-up    Consultants/Specialty  Orthopedic surgery     Code Status  Full code    Disposition  Home tomorrow after completion of IV antibiotics    Review of Systems  Review of Systems   Constitutional: Negative for chills, fever and weight loss.   HENT: Negative for ear pain, hearing loss and tinnitus.    Eyes: Negative for blurred vision, double vision and photophobia.   Respiratory: Negative for cough, hemoptysis and sputum production.    Cardiovascular: Negative for chest pain, palpitations and orthopnea.   Gastrointestinal: Negative for heartburn, nausea and vomiting.   Genitourinary: Negative for dysuria, flank pain, frequency and hematuria.   Musculoskeletal: Positive for joint pain. Negative for back pain and neck pain.   Skin: Negative for itching and rash.   Neurological: Negative for tremors, speech change, focal weakness and headaches.   Endo/Heme/Allergies: Negative for environmental allergies and polydipsia. Does not bruise/bleed easily.   Psychiatric/Behavioral: Negative for hallucinations and substance abuse. The patient is not nervous/anxious.         Physical Exam  Temp:  [36.7 °C (98 °F)-37 °C (98.6 °F)] 37 °C (98.6 °F)  Pulse:  [58-73] 73  Resp:  [16] 16  BP: (109-134)/(65-89) 133/84  SpO2:  [93 %-98 %] 98 %    Physical Exam  Vitals signs and nursing note reviewed.   Constitutional:       General: She is not in acute distress.     Appearance: Normal appearance.   HENT:      Head:  Normocephalic and atraumatic.      Nose: Nose normal.      Mouth/Throat:      Mouth: Mucous membranes are moist.   Eyes:      Extraocular Movements: Extraocular movements intact.      Pupils: Pupils are equal, round, and reactive to light.   Neck:      Musculoskeletal: Normal range of motion and neck supple.   Cardiovascular:      Rate and Rhythm: Normal rate and regular rhythm.   Pulmonary:      Effort: Pulmonary effort is normal.      Breath sounds: Normal breath sounds.   Abdominal:      General: Abdomen is flat. There is no distension.      Tenderness: There is no abdominal tenderness.   Musculoskeletal:         General: No swelling or deformity.      Comments: Right lower extremity covered with sterile dressing   Skin:     General: Skin is warm and dry.      Comments: Hyperpigmented skin lesion, multiple, scattered   Neurological:      General: No focal deficit present.      Mental Status: She is alert and oriented to person, place, and time.   Psychiatric:         Mood and Affect: Mood normal.         Behavior: Behavior normal.       I examined the patient on 3/27.  No significant changes from 3/26 noted except as documented above  Fluids    Intake/Output Summary (Last 24 hours) at 3/27/2020 0939  Last data filed at 3/27/2020 0748  Gross per 24 hour   Intake 600 ml   Output 0 ml   Net 600 ml       Laboratory  Recent Labs     03/24/20  1037 03/25/20  0530 03/26/20  1106   WBC 8.3 9.9 7.2   RBC 4.20 3.90* 3.95*   HEMOGLOBIN 12.8 11.7* 11.8*   HEMATOCRIT 37.1 33.8* 34.9*   MCV 88.3 86.7 88.4   MCH 30.5 30.0 29.9   MCHC 34.5 34.6 33.8   RDW 43.3 40.9 41.7   PLATELETCT 193 227 246   MPV 10.1 9.3 8.7*     Recent Labs     03/24/20  1037 03/25/20  0530 03/26/20  1106   SODIUM 136 140 137   POTASSIUM 4.5 3.7 3.7   CHLORIDE 105 106 102   CO2 20 22 23   GLUCOSE 119* 97 89   BUN 9 10 10   CREATININE 0.63 0.70 0.73   CALCIUM 8.7 8.8 8.9                   Imaging  DX-KNEE 3 VIEWS RIGHT   Final Result      1.  Moderate RIGHT  knee joint effusion.   2.  No fracture or dislocation.   3.  Minimal degenerative changes.           Assessment/Plan  Pyogenic arthritis of right knee joint (HCC)- (present on admission)  Assessment & Plan  Status post arthrocentesis in the ER  Fluid cell count reveals WBC count of 68402 predominantly neutrophils which is suggestive of septic arthritis  Started on IV antibiotics  Synovial fluid from 3/24 Gram stain showed white blood cells, no organism  Status post arthroscopic drainage on 3/24  Pain control with Tylenol and oxycodone.  IV morphine for breakthrough pain  Follow-up with culture and sensitivity.  Urine gonorrhea/chlamydia screen negative      Obesity- (present on admission)  Assessment & Plan  Body mass index is 34.7 kg/m².  Patient has been counseled on diet and lifestyle modifications      Syphilis  Assessment & Plan  RPR positive with a very high titer of 1:256  Treatment plan per ID, weekly intramuscular penicillin, to continue as outpatient at [facility] after discharge    IVDU (intravenous drug user)- (present on admission)  Assessment & Plan  Continue methadone; reduce dose to 10 mg twice daily 3/26 due to somnolence  Counseled on cessation    Tobacco use- (present on admission)  Assessment & Plan  Smoking cessation counseling on admission         VTE prophylaxis: Heparin

## 2020-03-27 NOTE — PROGRESS NOTES
"   Orthopaedic Progress Note    Interval changes:  Cultures NGTD  Cleared for DC by ortho pending medicine and ID team clearance    ROS - Patient denies any new issues.  Pain well controlled.    /84   Pulse 73   Temp 37 °C (98.6 °F) (Temporal)   Resp 16   Ht 1.753 m (5' 9\")   Wt 105 kg (231 lb 7.7 oz)   SpO2 98%       Patient seen and examined  No acute distress  Breathing non labored  RRR  RLE Surgical dressing is clean, dry, and intact. Patient clearly fires tibialis anterior, EHL, and gastrocnemius/soleus. Sensation is intact to light touch throughout superficial peroneal, deep peroneal, tibial, saphenous, and sural nerve distributions. Strong and palpable 2+ dorsalis pedis and posterior tibial pulses with capillary refill less than 2 seconds. No lower leg tenderness or discomfort.       Recent Labs     03/25/20  0530 03/26/20  1106   WBC 9.9 7.2   RBC 3.90* 3.95*   HEMOGLOBIN 11.7* 11.8*   HEMATOCRIT 33.8* 34.9*   MCV 86.7 88.4   MCH 30.0 29.9   MCHC 34.6 33.8   RDW 40.9 41.7   PLATELETCT 227 246   MPV 9.3 8.7*       Active Hospital Problems    Diagnosis   • Pyogenic arthritis of right knee joint (HCC) [M00.9]     Priority: High   • Obesity [E66.9]     Priority: Medium     Nutrition on  Needs outpatient weight loss     • Tobacco use [Z72.0]     Priority: Low     Nicotine replacement PRN  Strongly encouraged to quit  Counseled at least 8 minutes on 02/08/2016     • Syphilis [A53.9]   • IVDU (intravenous drug user) [F19.90]       Assessment/Plan:  Cleared for DC by ortho pending medicine and ID team clearance  POD#3 S/P Arthroscopic lavage of right knee  Wt bearing status - WBAT  Wound care/Drains - dressing changed every other day by nursing  Future Procedures - none planned   Lovenox: Start 3/25, Duration-until ambulatory > 150'  Sutures/Staples out- 10-14 days post operatively  PT/OT-initiated  Antibiotics: rocephin 2g IV QD, bicillin-LA 2.4 mil units   DVT Prophylaxis- TEDS/SCDs/Foot " pumps  Birch-none  Case Coordination for Discharge Planning - Disposition pending abx needs

## 2020-03-27 NOTE — THERAPY
"Physical Therapy Evaluation completed.   Bed Mobility:  Supine to Sit: Supervised  Transfers: Sit to Stand: Supervised  Gait: Level Of Assist: Supervised with Front-Wheel Walker       Plan of Care: Patient with no further skilled PT needs in the acute care setting at this time  Discharge Recommendations: Equipment: Front-Wheel Walker. Post-acute therapy Pt is homeless, if possible pt would benefit from home health physical therapy to address gait disturbances.    Pt is a 34 year old female with PMH of IV drug use, tobacco abuse, who presented 3/22 with right knee pain for the past 2 days. Pt found to have pyogenic arthritis of the right knee joint and underwent arthroscopic lavage of the R knee (WBAT). Pt is homeless and reports being independent prior. Pt found in bed and agreeable to work with therapy. Pt educated on WB status. Pt performed supine>sitting and STS with FWW with SPV. Pt ambulated from bed to bathroom with SPV and use of FWW and performed toilet transfer with SPV. Pt ambulated in hallway 300ft with FWW and SPV. Pt provided with verbal cues for safety awareness. Pt ambulating with antalgic gait and decreased weight bearing through R LE. Pt negotiated 3 steps with SPV and verbal cues for sequencing. Pt returned to room and left in bed with call light within reach. Patient will not be actively followed for physical therapy services at this time due to supervision level for functional mobility. Pt will need FWW for safe discharge. Pt is homeless, if possible pt would benefit from home health physical therapy to address gait disturbances.    See \"Rehab Therapy-Acute\" Patient Summary Report for complete documentation.     Elsi Fernandez PT  Pager [phone]  "

## 2020-03-27 NOTE — CARE PLAN
Problem: Bowel/Gastric:  Goal: Normal bowel function is maintained or improved  Outcome: PROGRESSING SLOWER THAN EXPECTED   Last BM 3/20. Miralax administered last night (3/25). Patient declines Milk of Magnesia tonight but states she is open to taking it in the morning.     Problem: Mobility  Goal: Risk for activity intolerance will decrease  Outcome: PROGRESSING SLOWER THAN EXPECTED   Patient encouraged to ambulate to the restroom at minimum.

## 2020-03-27 NOTE — CARE PLAN
Problem: Bowel/Gastric:  Goal: Normal bowel function is maintained or improved  Outcome: PROGRESSING AS EXPECTED  Patient had a bm after receiving laxative this morning.      Problem: Mobility  Goal: Risk for activity intolerance will decrease  Outcome: PROGRESSING AS EXPECTED   Patient ambulated well with front wheel walker.     Patient refused oral care despite education.    Spoke with Meds to bed they will deliver antibiotics for discharge anticipated tomorrow 3.28 per MD order.   Request they leave in patients medication drawer.

## 2020-03-28 VITALS
HEIGHT: 69 IN | HEART RATE: 76 BPM | RESPIRATION RATE: 16 BRPM | DIASTOLIC BLOOD PRESSURE: 91 MMHG | OXYGEN SATURATION: 94 % | WEIGHT: 228.18 LBS | SYSTOLIC BLOOD PRESSURE: 136 MMHG | TEMPERATURE: 99.1 F | BODY MASS INDEX: 33.8 KG/M2

## 2020-03-28 PROBLEM — R78.81 GRAM-POSITIVE BACTEREMIA: Status: ACTIVE | Noted: 2020-03-28

## 2020-03-28 LAB
BACTERIA BLD CULT: NORMAL
SIGNIFICANT IND 70042: NORMAL
SITE SITE: NORMAL
SOURCE SOURCE: NORMAL

## 2020-03-28 PROCEDURE — 700102 HCHG RX REV CODE 250 W/ 637 OVERRIDE(OP): Performed by: INTERNAL MEDICINE

## 2020-03-28 PROCEDURE — 700102 HCHG RX REV CODE 250 W/ 637 OVERRIDE(OP): Performed by: HOSPITALIST

## 2020-03-28 PROCEDURE — 700111 HCHG RX REV CODE 636 W/ 250 OVERRIDE (IP): Performed by: INTERNAL MEDICINE

## 2020-03-28 PROCEDURE — 36415 COLL VENOUS BLD VENIPUNCTURE: CPT

## 2020-03-28 PROCEDURE — 99232 SBSQ HOSP IP/OBS MODERATE 35: CPT | Performed by: INTERNAL MEDICINE

## 2020-03-28 PROCEDURE — A9270 NON-COVERED ITEM OR SERVICE: HCPCS | Performed by: INTERNAL MEDICINE

## 2020-03-28 PROCEDURE — A9270 NON-COVERED ITEM OR SERVICE: HCPCS | Performed by: HOSPITALIST

## 2020-03-28 PROCEDURE — 700105 HCHG RX REV CODE 258: Performed by: INTERNAL MEDICINE

## 2020-03-28 PROCEDURE — 87040 BLOOD CULTURE FOR BACTERIA: CPT | Mod: 91

## 2020-03-28 PROCEDURE — 99239 HOSP IP/OBS DSCHRG MGMT >30: CPT | Performed by: INTERNAL MEDICINE

## 2020-03-28 RX ORDER — ALPRAZOLAM 0.25 MG/1
0.25 TABLET ORAL NIGHTLY PRN
Status: DISCONTINUED | OUTPATIENT
Start: 2020-03-28 | End: 2020-03-28 | Stop reason: HOSPADM

## 2020-03-28 RX ORDER — OXYCODONE HYDROCHLORIDE 5 MG/1
10 TABLET ORAL EVERY 4 HOURS PRN
Qty: 24 TAB | Refills: 0 | Status: SHIPPED | OUTPATIENT
Start: 2020-03-28 | End: 2020-03-31

## 2020-03-28 RX ADMIN — OXYCODONE HYDROCHLORIDE 10 MG: 10 TABLET ORAL at 16:45

## 2020-03-28 RX ADMIN — CEFTRIAXONE SODIUM 2 G: 2 INJECTION, POWDER, FOR SOLUTION INTRAMUSCULAR; INTRAVENOUS at 06:23

## 2020-03-28 RX ADMIN — IBUPROFEN 600 MG: 600 TABLET ORAL at 09:00

## 2020-03-28 RX ADMIN — OXYCODONE HYDROCHLORIDE 10 MG: 10 TABLET ORAL at 12:50

## 2020-03-28 RX ADMIN — HYDROXYZINE HYDROCHLORIDE 10 MG: 10 TABLET, FILM COATED ORAL at 15:53

## 2020-03-28 RX ADMIN — HYDROXYZINE HYDROCHLORIDE 10 MG: 10 TABLET, FILM COATED ORAL at 06:57

## 2020-03-28 RX ADMIN — ENOXAPARIN SODIUM 40 MG: 100 INJECTION SUBCUTANEOUS at 06:23

## 2020-03-28 RX ADMIN — IBUPROFEN 600 MG: 600 TABLET ORAL at 12:48

## 2020-03-28 RX ADMIN — OXYCODONE HYDROCHLORIDE 10 MG: 10 TABLET ORAL at 09:00

## 2020-03-28 RX ADMIN — OXYCODONE HYDROCHLORIDE 10 MG: 10 TABLET ORAL at 02:02

## 2020-03-28 RX ADMIN — METHADONE HYDROCHLORIDE 10 MG: 10 TABLET ORAL at 06:23

## 2020-03-28 RX ADMIN — IBUPROFEN 600 MG: 600 TABLET ORAL at 16:45

## 2020-03-28 ASSESSMENT — ENCOUNTER SYMPTOMS
BACK PAIN: 0
WEIGHT LOSS: 0
DIARRHEA: 0
HALLUCINATIONS: 0
COUGH: 0
HEMOPTYSIS: 0
HEARTBURN: 0
VOMITING: 0
SPEECH CHANGE: 0
DOUBLE VISION: 0
POLYDIPSIA: 0
NECK PAIN: 0
FEVER: 0
ORTHOPNEA: 0
CHILLS: 0
SHORTNESS OF BREATH: 0
HEADACHES: 0
BRUISES/BLEEDS EASILY: 0
ABDOMINAL PAIN: 0
PHOTOPHOBIA: 0
PALPITATIONS: 0
NAUSEA: 0
SPUTUM PRODUCTION: 0
FLANK PAIN: 0
FOCAL WEAKNESS: 0
BLURRED VISION: 0
TREMORS: 0
NERVOUS/ANXIOUS: 0

## 2020-03-28 ASSESSMENT — LIFESTYLE VARIABLES: SUBSTANCE_ABUSE: 0

## 2020-03-28 ASSESSMENT — FIBROSIS 4 INDEX: FIB4 SCORE: 0.27

## 2020-03-28 NOTE — ASSESSMENT & PLAN NOTE
In 1 bottle on admission.  Most likely contaminant.  Ordered repeat blood cultures 3/28.  Continue ceftriaxone for now  Follow-up with ID and sensitivity

## 2020-03-28 NOTE — CARE PLAN
Problem: Knowledge Deficit  Goal: Knowledge of the prescribed therapeutic regimen will improve  Outcome: PROGRESSING SLOWER THAN EXPECTED     Problem: Mobility  Goal: Risk for activity intolerance will decrease  Outcome: PROGRESSING SLOWER THAN EXPECTED       Patient very fatigued. Declining mobilization and oral care.

## 2020-03-28 NOTE — PROGRESS NOTES
Hospital Medicine Daily Progress Note    Date of Service  3/28/2020    Chief Complaint/Hospital Course  34 y.o. female with a past medical history of IV drug use, tobacco abuse, who presented 3/22/2020 with right knee pain , admitted with septic arthritis of right knee, undergone arthroscopic lavage of right knee on 3/20        Interval Problem Update    Patient is in her bed, complaining of insomnia and feeling tired.  Blood culture on admission positive for gram-positive rods in 1 bottle.  I discussed this finding with Dr Zarate/infectious disease, and he recommended to repeat blood culture.  Therefore discharge canceled.  Repeat blood culture ordered.  We will continue ceftriaxone IV for the time being    Consultants/Specialty  Orthopedic surgery     Code Status  Full code    Disposition  Home tomorrow after completion of IV antibiotics    Review of Systems  Review of Systems   Constitutional: Negative for chills, fever and weight loss.   HENT: Negative for ear pain, hearing loss and tinnitus.    Eyes: Negative for blurred vision, double vision and photophobia.   Respiratory: Negative for cough, hemoptysis and sputum production.    Cardiovascular: Negative for chest pain, palpitations and orthopnea.   Gastrointestinal: Negative for heartburn, nausea and vomiting.   Genitourinary: Negative for dysuria, flank pain, frequency and hematuria.   Musculoskeletal: Positive for joint pain. Negative for back pain and neck pain.   Skin: Negative for itching and rash.   Neurological: Negative for tremors, speech change, focal weakness and headaches.   Endo/Heme/Allergies: Negative for environmental allergies and polydipsia. Does not bruise/bleed easily.   Psychiatric/Behavioral: Negative for hallucinations and substance abuse. The patient is not nervous/anxious.         Physical Exam  Temp:  [36.4 °C (97.5 °F)-37.4 °C (99.4 °F)] 37 °C (98.6 °F)  Pulse:  [63-75] 71  Resp:  [16] 16  BP: (108-123)/(72-83) 123/83  SpO2:  [94  %-95 %] 94 %    Physical Exam  Vitals signs and nursing note reviewed.   Constitutional:       General: She is not in acute distress.     Appearance: Normal appearance.   HENT:      Head: Normocephalic and atraumatic.      Nose: Nose normal.      Mouth/Throat:      Mouth: Mucous membranes are moist.   Eyes:      Extraocular Movements: Extraocular movements intact.      Pupils: Pupils are equal, round, and reactive to light.   Neck:      Musculoskeletal: Normal range of motion and neck supple.   Cardiovascular:      Rate and Rhythm: Normal rate and regular rhythm.   Pulmonary:      Effort: Pulmonary effort is normal.      Breath sounds: Normal breath sounds.   Abdominal:      General: Abdomen is flat. There is no distension.      Tenderness: There is no abdominal tenderness.   Musculoskeletal:         General: No swelling or deformity.      Comments: Right lower extremity covered with sterile dressing   Skin:     General: Skin is warm and dry.      Comments: Hyperpigmented skin lesion, multiple, scattered   Neurological:      General: No focal deficit present.      Mental Status: She is alert and oriented to person, place, and time.   Psychiatric:         Mood and Affect: Mood normal.         Behavior: Behavior normal.       I examined the patient on 3/28.  No significant changes from 3/27 noted except as documented above  Fluids    Intake/Output Summary (Last 24 hours) at 3/28/2020 0914  Last data filed at 3/27/2020 2022  Gross per 24 hour   Intake 240 ml   Output --   Net 240 ml       Laboratory  Recent Labs     03/26/20  1106   WBC 7.2   RBC 3.95*   HEMOGLOBIN 11.8*   HEMATOCRIT 34.9*   MCV 88.4   MCH 29.9   MCHC 33.8   RDW 41.7   PLATELETCT 246   MPV 8.7*     Recent Labs     03/26/20  1106   SODIUM 137   POTASSIUM 3.7   CHLORIDE 102   CO2 23   GLUCOSE 89   BUN 10   CREATININE 0.73   CALCIUM 8.9                   Imaging  DX-KNEE 3 VIEWS RIGHT   Final Result      1.  Moderate RIGHT knee joint effusion.   2.  No  fracture or dislocation.   3.  Minimal degenerative changes.           Assessment/Plan  Pyogenic arthritis of right knee joint (HCC)- (present on admission)  Assessment & Plan  Status post arthrocentesis in the ER  Fluid cell count reveals W[place] count [place] 45353 predominantly neutrophils which is suggestive of septic arthritis  Started on IV antibiotics  Synovial fluid from 3/24 Gram stain showed white blood cells, no organism  Status post arthroscopic drainage on 3/24  Pain control with Tylenol and oxycodone.  IV morphine for breakthrough pain  Follow-up with culture and sensitivity.  Urine gonorrhea/chlamydia screen negative  Synovial fluid culture negative    Obesity- (present on admission)  Assessment & Plan  Body mass index is 34.7 kg/m².  Patient has been counseled on diet and lifestyle modifications      Gram-positive bacteremia  Assessment & Plan  In 1 bottle on admission.  Most likely contaminant.  Ordered repeat blood cultures 3/28.  Continue ceftriaxone for now  Follow-up with ID and sensitivity    Syphilis  Assessment & Plan  RPR positive with a very high titer of 1:256  Treatment plan per ID, weekly intramuscular penicillin, to continue as outpatient [facility] after discharge    IVDU (intravenous drug user)- (present on admission)  Assessment & Plan  Continue methadone; reduce dose to 10 mg twice daily 3/26 due to somnolence  Counseled on cessation    Tobacco use- (present on admission)  Assessment & Plan  Smoking cessation counseling on admission         VTE prophylaxis: Heparin

## 2020-03-28 NOTE — PROGRESS NOTES
"   Orthopaedic Progress Note    Interval changes:  Dressings changed- incisions without issue  Cleared for DC by ortho pending medicine and ID team clearance    ROS - Patient denies any new issues.  Pain well controlled.    /91   Pulse 76   Temp 37.3 °C (99.1 °F) (Temporal)   Resp 16   Ht 1.753 m (5' 9\")   Wt 103.5 kg (228 lb 2.8 oz)   SpO2 94%       Patient seen and examined  No acute distress  Breathing non labored  RRR  RLE dressings changed, surgical incisions are well approximated and are dry and clean.  There is no erythema, induration, or signs of infection at any of the incision sites. Patient clearly fires tibialis anterior, EHL, and gastrocnemius/soleus. Sensation is intact to light touch throughout superficial peroneal, deep peroneal, tibial, saphenous, and sural nerve distributions. Strong and palpable 2+ dorsalis pedis and posterior tibial pulses with capillary refill less than 2 seconds. No lower leg tenderness or discomfort.       Recent Labs     03/26/20  1106   WBC 7.2   RBC 3.95*   HEMOGLOBIN 11.8*   HEMATOCRIT 34.9*   MCV 88.4   MCH 29.9   MCHC 33.8   RDW 41.7   PLATELETCT 246   MPV 8.7*       Active Hospital Problems    Diagnosis   • Pyogenic arthritis of right knee joint (HCC) [M00.9]     Priority: High   • Obesity [E66.9]     Priority: Medium     Nutrition on  Needs outpatient weight loss     • Tobacco use [Z72.0]     Priority: Low     Nicotine replacement PRN  Strongly encouraged to quit  Counseled at least 8 minutes on 02/08/2016     • Gram-positive bacteremia [R78.81]   • Syphilis [A53.9]   • IVDU (intravenous drug user) [F19.90]       Assessment/Plan:  Cleared for DC by ortho pending medicine and ID team clearance  POD#4 S/P Arthroscopic lavage of right knee  Wt bearing status - WBAT  Wound care/Drains - dressing changed every other day by nursing  Future Procedures - none planned   Lovenox: Start 3/25, Duration-until ambulatory > 150'  Sutures/Staples out- 10-14 days post " operatively  PT/OT-initiated  Antibiotics: rocephin 2g IV QD, bicillin-LA 2.4 mil units   DVT Prophylaxis- TEDS/SCDs/Foot pumps  Birch-none  Case Coordination for Discharge Planning - Disposition pending abx needs

## 2020-03-28 NOTE — FACE TO FACE
Face to Face Note  -  Durable Medical Equipment    Rivas Wright M.D. - NPI: [id number]  I certify that this patient is under my care and that they had a durable medical equipment(DME)face to face encounter by myself that meets the physician DME face-to-face encounter requirements with this patient on:    Date of encounter:   Patient:                    MRN:                       YOB: 2018  Kat Devlin  [id number]  1985     The encounter with the patient was in whole, or in part, for the following medical condition, which is the primary reason for durable medical equipment:  Other - septic arthritis of the knee    I certify that, based on my findings, the following durable medical equipment is medically necessary:  Walkers.    HOME O2 Saturation Measurements:(Values must be present for Home Oxygen orders)         ,     ,         My Clinical findings support the need for the above equipment due to:  Abnormal Gait

## 2020-03-29 LAB
BACTERIA SPEC ANAEROBE CULT: NORMAL
SIGNIFICANT IND 70042: NORMAL
SITE SITE: NORMAL
SOURCE SOURCE: NORMAL

## 2020-03-29 NOTE — PROGRESS NOTES
"Patient wanting to leave AMA. This RN expressed concerns about positive blood culture result this morning to patient. Patient verbalizes understanding of risks involved with leaving at this time. POC reviewed. Patient adamant about leaving. Patient crying saying that she knows \"she is being stupid\" but insists on leaving anyway. MD made aware of situation, charge nurse made aware of situation, patient given abx delivered to patient yesterday held in drawer. Patient told to follow up with [facility] ASAP per MD and to come to ER with any signs of worsening infection. Patient left AMA, with belongings and walker.  "

## 2020-03-29 NOTE — DISCHARGE SUMMARY
Discharge Summary    CHIEF COMPLAINT ON ADMISSION  Chief Complaint   Patient presents with   • Knee Pain     complaints of right knee pain since 10am this morning; pt states she has been seen here in the past and had her knee drained. Pt denies any recnet trauma        Reason for Admission  Knee pain     Admission Date  3/22/2020    CODE STATUS  Full code    HPI & HOSPITAL COURSE  34 y.o. female with a past medical history of IV drug use, tobacco abuse, who presented 3/22/2020 with right knee pain for the past 2 days.  Right knee x-ray showed moderate effusion.  She had arthrocentesis on admission.  Patient was diagnosed with pyogenic arthritis of the right knee joint.  She was started on empiric antibiotics.  She was consulted by orthopedic surgeon Dr. Paulino and had arthroscopic lavage of the right knee done on 3/24   Synovial fluid cultures has been negative.  Gonorrhea/Chlamydia urine screen was negative.  Due to very high RPR titer, patient was started on intramuscular penicillin, to continue once a week for additional 2 weeks after discharge at [facility]. peripheral blood culture in 1 bottle on admission came back positive for gram-negative rods.  Repeat blood culture was ordered.  Unfortunately, on 3/28 patient decided to leave AMA.  We discussed with her risks of leaving AMA, including permanent disability and death  Patient received counseling regarding IV drug use      FOLLOW UP ITEMS POST DISCHARGE  [facility]  Orthopedics clinic    DISCHARGE DIAGNOSES  Active Problems:    Pyogenic arthritis of right knee joint (HCC) POA: Yes    Obesity POA: Yes      Overview: Nutrition on      Needs outpatient weight loss    IVDU (intravenous drug user) POA: Yes    Syphilis POA: Unknown    Gram-positive bacteremia POA: Unknown    Tobacco use (Chronic) POA: Yes      Overview: Nicotine replacement PRN      Strongly encouraged to quit      Counseled at least 8 minutes on 02/08/2016  Resolved Problems:    * No resolved  hospital problems. *      FOLLOW UP  No future appointments.  Pcp Pt States None            MEDICATIONS ON DISCHARGE     Medication List      START taking these medications      Instructions   amoxicillin-clavulanate 875-125 MG Tabs  Commonly known as:  AUGMENTIN   Take 1 Tab by mouth 2 times a day for 7 days.  Dose:  1 Tab     doxycycline 100 MG capsule  Commonly known as:  MONODOX   Take 1 Cap by mouth 2 times a day for 7 days.  Dose:  100 mg     oxyCODONE immediate-release 5 MG Tabs  Commonly known as:  ROXICODONE   Take 2 Tabs by mouth every four hours as needed for up to 3 days.  Dose:  10 mg            Allergies  No Known Allergies    DIET  No orders of the defined types were placed in this encounter.      CONSULTATIONS  Infectious disease  Orthopedic surgery    PROCEDURES  Arthroscopic lavage of right knee    LABORATORY  Lab Results   Component Value Date    SODIUM 137 03/26/2020    POTASSIUM 3.7 03/26/2020    CHLORIDE 102 03/26/2020    CO2 23 03/26/2020    GLUCOSE 89 03/26/2020    BUN 10 03/26/2020    CREATININE 0.73 03/26/2020    CREATININE 0.8 07/02/2008        Lab Results   Component Value Date    WBC 7.2 03/26/2020    HEMOGLOBIN 11.8 (L) 03/26/2020    HEMATOCRIT 34.9 (L) 03/26/2020    PLATELETCT 246 03/26/2020      DX-KNEE 3 VIEWS RIGHT   Final Result      1.  Moderate RIGHT knee joint effusion.   2.  No fracture or dislocation.   3.  Minimal degenerative changes.            Total time of the discharge process exceeds 57 minutes.

## 2020-04-01 SDOH — ECONOMIC STABILITY: FOOD INSECURITY: WITHIN THE PAST 12 MONTHS, THE FOOD YOU BOUGHT JUST DIDN'T LAST AND YOU DIDN'T HAVE MONEY TO GET MORE.: OFTEN TRUE

## 2020-04-01 SDOH — ECONOMIC STABILITY: TRANSPORTATION INSECURITY
IN THE PAST 12 MONTHS, HAS LACK OF TRANSPORTATION KEPT YOU FROM MEETINGS, WORK, OR FROM GETTING THINGS NEEDED FOR DAILY LIVING?: YES

## 2020-04-01 SDOH — ECONOMIC STABILITY: FOOD INSECURITY: WITHIN THE PAST 12 MONTHS, YOU WORRIED THAT YOUR FOOD WOULD RUN OUT BEFORE YOU GOT MONEY TO BUY MORE.: OFTEN TRUE

## 2020-04-01 SDOH — ECONOMIC STABILITY: INCOME INSECURITY: HOW HARD IS IT FOR YOU TO PAY FOR THE VERY BASICS LIKE FOOD, HOUSING, MEDICAL CARE, AND HEATING?: VERY HARD

## 2020-04-01 SDOH — ECONOMIC STABILITY: TRANSPORTATION INSECURITY
IN THE PAST 12 MONTHS, HAS THE LACK OF TRANSPORTATION KEPT YOU FROM MEDICAL APPOINTMENTS OR FROM GETTING MEDICATIONS?: YES

## 2020-04-01 NOTE — PROGRESS NOTES
CHW spoke with Krystin (EC-mom). CHW introduced [facility] services. Krystin reports [place] homeless[place], and [place] phone. Mom reports she's tried to help Kat with rehab, but has not been successful. Informed Krystin [facility] has a SW that can help Kat with resources. She reports she only has contact with Kat sometimes. Mom reports will wait for Kat to get in contact with [facility] (pt's ) to reach out to her, and find out if Kat wants helps. Krystin will reach out to [facility] in the future. Provided my contact info, advised to call anytime. CHW will reach out to Kat if presents again in the ED.

## 2020-04-04 LAB
BACTERIA BLD CULT: ABNORMAL
BACTERIA BLD CULT: ABNORMAL
SIGNIFICANT IND 70042: ABNORMAL
SITE SITE: ABNORMAL
SOURCE SOURCE: ABNORMAL
TEST NAME 95000: NORMAL

## 2020-11-12 ENCOUNTER — HOSPITAL ENCOUNTER (EMERGENCY)
Facility: MEDICAL CENTER | Age: 35
End: 2020-11-12
Attending: EMERGENCY MEDICINE | Admitting: EMERGENCY MEDICINE
Payer: COMMERCIAL

## 2020-11-12 VITALS
DIASTOLIC BLOOD PRESSURE: 54 MMHG | TEMPERATURE: 98.4 F | HEART RATE: 83 BPM | RESPIRATION RATE: 27 BRPM | BODY MASS INDEX: 35.43 KG/M2 | WEIGHT: 239.2 LBS | SYSTOLIC BLOOD PRESSURE: 92 MMHG | OXYGEN SATURATION: 96 % | HEIGHT: 69 IN

## 2020-11-12 DIAGNOSIS — Z59.00 HOMELESSNESS: ICD-10-CM

## 2020-11-12 DIAGNOSIS — L03.115 CELLULITIS OF RIGHT LOWER EXTREMITY: ICD-10-CM

## 2020-11-12 LAB
ALBUMIN SERPL BCP-MCNC: 3.6 G/DL (ref 3.2–4.9)
ALBUMIN/GLOB SERPL: 0.9 G/DL
ALP SERPL-CCNC: 76 U/L (ref 30–99)
ALT SERPL-CCNC: 49 U/L (ref 2–50)
ANION GAP SERPL CALC-SCNC: 10 MMOL/L (ref 7–16)
AST SERPL-CCNC: 26 U/L (ref 12–45)
BASOPHILS # BLD AUTO: 0.2 % (ref 0–1.8)
BASOPHILS # BLD: 0.02 K/UL (ref 0–0.12)
BILIRUB SERPL-MCNC: 1.2 MG/DL (ref 0.1–1.5)
BUN SERPL-MCNC: 11 MG/DL (ref 8–22)
CALCIUM SERPL-MCNC: 8.7 MG/DL (ref 8.5–10.5)
CHLORIDE SERPL-SCNC: 97 MMOL/L (ref 96–112)
CO2 SERPL-SCNC: 26 MMOL/L (ref 20–33)
CREAT SERPL-MCNC: 0.78 MG/DL (ref 0.5–1.4)
EOSINOPHIL # BLD AUTO: 0.05 K/UL (ref 0–0.51)
EOSINOPHIL NFR BLD: 0.5 % (ref 0–6.9)
ERYTHROCYTE [DISTWIDTH] IN BLOOD BY AUTOMATED COUNT: 41.7 FL (ref 35.9–50)
GLOBULIN SER CALC-MCNC: 3.9 G/DL (ref 1.9–3.5)
GLUCOSE SERPL-MCNC: 109 MG/DL (ref 65–99)
HCT VFR BLD AUTO: 38.5 % (ref 37–47)
HGB BLD-MCNC: 12.8 G/DL (ref 12–16)
IMM GRANULOCYTES # BLD AUTO: 0.05 K/UL (ref 0–0.11)
IMM GRANULOCYTES NFR BLD AUTO: 0.5 % (ref 0–0.9)
LACTATE BLD-SCNC: 1.9 MMOL/L (ref 0.5–2)
LYMPHOCYTES # BLD AUTO: 1.97 K/UL (ref 1–4.8)
LYMPHOCYTES NFR BLD: 19.2 % (ref 22–41)
MCH RBC QN AUTO: 30.6 PG (ref 27–33)
MCHC RBC AUTO-ENTMCNC: 33.2 G/DL (ref 33.6–35)
MCV RBC AUTO: 92.1 FL (ref 81.4–97.8)
MONOCYTES # BLD AUTO: 0.45 K/UL (ref 0–0.85)
MONOCYTES NFR BLD AUTO: 4.4 % (ref 0–13.4)
NEUTROPHILS # BLD AUTO: 7.73 K/UL (ref 2–7.15)
NEUTROPHILS NFR BLD: 75.2 % (ref 44–72)
NRBC # BLD AUTO: 0 K/UL
NRBC BLD-RTO: 0 /100 WBC
PLATELET # BLD AUTO: 249 K/UL (ref 164–446)
PMV BLD AUTO: 8.7 FL (ref 9–12.9)
POTASSIUM SERPL-SCNC: 3.4 MMOL/L (ref 3.6–5.5)
PROT SERPL-MCNC: 7.5 G/DL (ref 6–8.2)
RBC # BLD AUTO: 4.18 M/UL (ref 4.2–5.4)
SODIUM SERPL-SCNC: 133 MMOL/L (ref 135–145)
WBC # BLD AUTO: 10.3 K/UL (ref 4.8–10.8)

## 2020-11-12 PROCEDURE — 87040 BLOOD CULTURE FOR BACTERIA: CPT

## 2020-11-12 PROCEDURE — 96365 THER/PROPH/DIAG IV INF INIT: CPT

## 2020-11-12 PROCEDURE — 700111 HCHG RX REV CODE 636 W/ 250 OVERRIDE (IP): Performed by: EMERGENCY MEDICINE

## 2020-11-12 PROCEDURE — A9270 NON-COVERED ITEM OR SERVICE: HCPCS | Performed by: EMERGENCY MEDICINE

## 2020-11-12 PROCEDURE — 99284 EMERGENCY DEPT VISIT MOD MDM: CPT

## 2020-11-12 PROCEDURE — 83605 ASSAY OF LACTIC ACID: CPT

## 2020-11-12 PROCEDURE — 700105 HCHG RX REV CODE 258: Performed by: EMERGENCY MEDICINE

## 2020-11-12 PROCEDURE — 80053 COMPREHEN METABOLIC PANEL: CPT

## 2020-11-12 PROCEDURE — 85025 COMPLETE CBC W/AUTO DIFF WBC: CPT

## 2020-11-12 PROCEDURE — 700102 HCHG RX REV CODE 250 W/ 637 OVERRIDE(OP): Performed by: EMERGENCY MEDICINE

## 2020-11-12 RX ORDER — SULFAMETHOXAZOLE AND TRIMETHOPRIM 800; 160 MG/1; MG/1
1 TABLET ORAL EVERY 12 HOURS
Qty: 14 TAB | Refills: 0 | Status: SHIPPED
Start: 2020-11-12 | End: 2020-11-19

## 2020-11-12 RX ORDER — SODIUM CHLORIDE, SODIUM LACTATE, POTASSIUM CHLORIDE, AND CALCIUM CHLORIDE .6; .31; .03; .02 G/100ML; G/100ML; G/100ML; G/100ML
30 INJECTION, SOLUTION INTRAVENOUS
Status: DISCONTINUED | OUTPATIENT
Start: 2020-11-12 | End: 2020-11-12 | Stop reason: HOSPADM

## 2020-11-12 RX ORDER — AMOXICILLIN AND CLAVULANATE POTASSIUM 875; 125 MG/1; MG/1
1 TABLET, FILM COATED ORAL 2 TIMES DAILY
Qty: 14 TAB | Refills: 0 | Status: SHIPPED
Start: 2020-11-12 | End: 2020-11-19

## 2020-11-12 RX ORDER — BUTALBITAL, ACETAMINOPHEN AND CAFFEINE 50; 325; 40 MG/1; MG/1; MG/1
2 TABLET ORAL ONCE
Status: COMPLETED | OUTPATIENT
Start: 2020-11-12 | End: 2020-11-12

## 2020-11-12 RX ADMIN — BUTALBITAL, ACETAMINOPHEN, AND CAFFEINE 2 TABLET: 50; 325; 40 TABLET ORAL at 02:35

## 2020-11-12 RX ADMIN — SODIUM CHLORIDE 3 G: 900 INJECTION INTRAVENOUS at 02:36

## 2020-11-12 SDOH — ECONOMIC STABILITY - HOUSING INSECURITY: HOMELESSNESS UNSPECIFIED: Z59.00

## 2020-11-12 ASSESSMENT — FIBROSIS 4 INDEX: FIB4 SCORE: 0.27

## 2020-11-12 NOTE — ED TRIAGE NOTES
"Kat Pandyalo  34 y.o. female  Chief Complaint   Patient presents with   • Abscess     Pt uses IV drugs and has 3 areas of swelling, redness to RLE.   • Leg Swelling     red cellulitis to lower RLE   • Painful Urination     \"burning\" x 4-5 days ago   • Detox     pt would like help detoxing from heroin       Patient ambulatory to triage with a steady gait for above complaint. Pt also reports fever 2 days ago, unable to check temp. Denies at this time but states that she has been taking tylenol.     Patient is alert and oriented, speaking in full sentences, following commands, and responding appropriately to questions. Educated on triage process and instructed patient to alert staff to any changes in condition or worsening symptoms.     /83   Pulse 92   Temp 36.9 °C (98.4 °F) (Oral)   Resp 18   Ht 1.753 m (5' 9\")   Wt 108.5 kg (239 lb 3.2 oz)   SpO2 97%   Breastfeeding No   BMI 35.32 kg/m²       "

## 2020-11-12 NOTE — ED PROVIDER NOTES
"ED Provider Note    ED Provider Note    Primary care provider: Pcp Pt States None  Means of arrival: Walk-in  History obtained from: Patient    CHIEF COMPLAINT  Chief Complaint   Patient presents with   • Abscess     Pt uses IV drugs and has 3 areas of swelling, redness to RLE.   • Leg Swelling     red cellulitis to lower RLE   • Painful Urination     \"burning\" x 4-5 days ago   • Detox     pt would like help detoxing from heroin     Seen at 1:41 AM.   HPI  Kat Devlin is a 34 y.o. female who presents to the Emergency Department for pain, swelling and redness of the right lower leg as well as multiple spontaneously draining sores on the right lower extremity as well.  The patient has a history of IV heroin use.  She last used a few hours prior to arrival.  She does inject intravenously in the lower extremities as well as the bilateral upper extremities.  She reports having 48 hours of subjective fevers, chills, mild intermittent headaches, leg redness.  She denies any cough, chest pain or shortness of breath.  She does note some dysuria intermittently.  She is hoping to get off of the heroin.  She is currently homeless and does not have a way of getting medications filled.    REVIEW OF SYSTEMS  See HPI,   Remainder of ROS negative.     PAST MEDICAL HISTORY   has a past medical history of Arthritis, ASTHMA, Back pain (2011), Headache(784.0) (2011), Migraines, Obesity, Pain (2012), PCOS (polycystic ovarian syndrome), Psychiatric disorder, and Type II or unspecified type diabetes mellitus without mention of complication, not stated as uncontrolled.    SURGICAL HISTORY   has a past surgical history that includes  delivery only; repeat c section (2012); other; other (2014); tubal coagulation laparoscopic bilateral (2015); dilation and curettage (2015); hysteroscopy novasure-2 (2015); and knee scope,diagnostic (Right, 3/24/2020).    SOCIAL HISTORY  Social History     Tobacco " "Use   • Smoking status: Current Every Day Smoker     Packs/day: 1.00     Years: 18.00     Pack years: 18.00     Types: Cigarettes   • Smokeless tobacco: Never Used   Substance Use Topics   • Alcohol use: No   • Drug use: Yes     Types: Intravenous     Comment: Heroin; last use a few hours ago      Social History     Substance and Sexual Activity   Drug Use Yes   • Types: Intravenous    Comment: Heroin; last use a few hours ago       FAMILY HISTORY  Family History   Problem Relation Age of Onset   • Non-contributory Mother    • Hyperlipidemia Mother    • Non-contributory Father    • Alcohol/Drug Father    • Heart Disease Maternal Grandfather    • Arthritis Maternal Grandmother    • Hyperlipidemia Maternal Grandmother        CURRENT MEDICATIONS  Reviewed.  See Encounter Summary.     ALLERGIES  No Known Allergies    PHYSICAL EXAM  VITAL SIGNS: /58   Pulse 88   Temp 36.9 °C (98.4 °F) (Oral)   Resp (!) 30   Ht 1.753 m (5' 9\")   Wt 108.5 kg (239 lb 3.2 oz)   SpO2 96%   Breastfeeding No   BMI 35.32 kg/m²   Constitutional: Awake, alert in no apparent distress.  HENT: Normocephalic, Bilateral external ears normal. Nose normal.   Eyes: Conjunctiva normal, non-icteric, EOMI.    Thorax & Lungs: Easy unlabored respirations, Clear to ascultation bilaterally.  Cardiovascular: Regular rate, Regular rhythm, No murmurs, rubs or gallops. Bilateral pulses symmetrical.   Abdomen:  Soft, nontender, nondistended, normal active bowel sounds.   :    Skin: Multiple scars and healed wounds on all 4 extremities.  The right lower extremity has circumferential erythema and increased warmth on the distal leg measuring about 15 cm in length, does not cross the ankle joint.  There is a 1 similar pustule on the proximal lower leg that was easily ruptured with gentle pressure.    Musculoskeletal:   No edema.  Good range of motion of the joints of the lower extremities.  Neurologic: Alert, Grossly non-focal.   Psychiatric: Normal " affect, Normal mood  Lymphatic:  No cervical LAD        RADIOLOGY  No orders to display         COURSE & MEDICAL DECISION MAKING  Pertinent Labs & Imaging studies reviewed. (See chart for details)        1:41 AM - Medical record reviewed, patient was admitted earlier in the year for a septic knee.  Synovial cultures were negative, the patient had a elevated RPR and was treated with penicillin for possible syphilis.  She underwent washout of the right knee, eventually she signed out AGAINST MEDICAL ADVICE.    3:10 AM-we discussed the unremarkable lab and discharge planning  Decision Making:  This is a 34 y.o. year old female who presents with cellulitis of the right lower extremity, subjective fevers and chills for the past few days and request to get off opioids.  The patient is pleasant, well-appearing, she is not currently demonstrating withdrawal symptoms.  She is afebrile, she is not tachycardic.  She has no leukocytosis or leftward shift.  There is no signs of sepsis.  She does have a significant cellulitis of the right lower extremity though it does not extend past joints.    With a normal laboratory evaluation, I do not have any compelling evidence for hospitalization.  It is certainly worth a trial of outpatient antibiotics.  As we are nearing capacity in the hospital with a COVID-19 pandemic, I cannot offer the patient a bed upstairs for detox and a simple cellulitis.  Social work was contacted and gave the patient some resources.  I did also check that she has Medicaid and this will cover her outpatient antibiotics.  If she notes no improvement after 48 hours or certainly worsening despite the antibiotics then it would be reasonable to admit her at that point for failure of outpatient treatment.    Discharge Medications:  New Prescriptions    AMOXICILLIN-CLAVULANATE (AUGMENTIN) 875-125 MG TAB    Take 1 Tab by mouth 2 times a day for 7 days.    SULFAMETHOXAZOLE-TRIMETHOPRIM (BACTRIM DS) 800-160 MG TABLET     Take 1 Tab by mouth every 12 hours for 7 days.       The patient was discharged home (see d/c instructions) was told to return immediately for any signs or symptoms listed, or any worsening at all.  The patient verbally agreed to the discharge precautions and follow-up plan which is documented in EPIC.        FINAL IMPRESSION  1. Cellulitis of right lower extremity    2. Homelessness

## 2020-11-12 NOTE — ED NOTES
Discharge instructions and prescriptions discussed with pt. Pt verbalized understanding. Pt discharged ambualtoy via [facility].

## 2020-11-12 NOTE — DISCHARGE PLANNING
This pt denies any si, hi or psychosis. She was treated for some medical issues related to injection of heroin with draining ulcers on her legs. She was also hoping for detox treatment. However she last used about 5-6 hrs ago and her vs are stable and she has no symptoms of active detox from chronic heroin abuse. Plan: cab via [facility] to her hotel room and return to [facility] when her vs demonstrate active detox. [facility] recommends about 12 hrs post last heroin injection.

## 2022-09-02 NOTE — PROGRESS NOTES
Pharmacy Kinetics 34 y.o. female on vancomycin day # 2  3/24/2020    Currently Dose: Vancomycin 1300 mg iv q8hr (~12 mg/kg/dose)  Received Load Dose: Yes    Indication for Treatment: septic arthritis  Provider Specified End Date: None  ID Service Following: Yes    Pertinent history per medical record: Admitted on 3/22/2020 for concerns of septic arthritis. Chart review notes concern for IVDU. ID consulted. Orthopedics consulted for intervention 3/24/20.    Other antibiotics: ceftriaxone 2 gm iv q24h    Allergies: Patient has no known allergies.     List concerns for accumulation of vancomycin: BMI ~35, abnormal LFT's, electrolyte derangement, nephrotoxic agents    Pertinent cultures to date:   Results     Procedure Component Value Units Date/Time    GRAM STAIN [[id number]] Collected:  03/24/20 0806    Order Status:  Completed Specimen:  Synovial Updated:  03/24/20 1155     Significant Indicator .     Source SYNO     Site Right Knee     Gram Stain Result Moderate WBCs.  No organisms seen.      Narrative:       Surgery Specimen    FLUID CULTURE W/GRAM STAIN [[id number]] Collected:  03/22/20 2300    Order Status:  Completed Specimen:  Synovial from Other Body Fluid Updated:  03/24/20 1148     Significant Indicator NEG     Source SYNO     Site Right Knee     Culture Result No growth at 24 hours.     Gram Stain Result Many WBCs.  No organisms seen.      Anaerobic Culture [[id number]] Collected:  03/24/20 0806    Order Status:  Completed Specimen:  Other Updated:  03/24/20 1134    FLUID CULTURE W/GRAM STAIN [[id number]] Collected:  03/24/20 0806    Order Status:  Completed Specimen:  Other Updated:  03/24/20 1134    BLOOD CULTURE [[id number]] Collected:  03/23/20 0135    Order Status:  Completed Specimen:  Blood from Peripheral Updated:  03/24/20 0726     Significant Indicator NEG     Source BLD     Site PERIPHERAL     Culture Result No Growth  Note: Blood cultures are incubated for 5 days and  are monitored  "continuously.Positive blood cultures  are called to the RN and reported as soon as  they are identified.      Narrative:       1 of 2 for Blood Culture x 2 sites order. Per Hospital  Policy: Only change Specimen Src: to \"Line\" if specified by  physician order.  Right Forearm/Arm    BLOOD CULTURE [[id number]] Collected:  03/23/20 0211    Order Status:  Completed Specimen:  Blood from Peripheral Updated:  03/24/20 0726     Significant Indicator NEG     Source BLD     Site PERIPHERAL     Culture Result No Growth  Note: Blood cultures are incubated for 5 days and  are monitored continuously.Positive blood cultures  are called to the RN and reported as soon as  they are identified.      Narrative:       2 of 2 blood culture x2  Sites order. Per Hospital Policy:  Only change Specimen Src: to \"Line\" if specified by physician  order.  Left AC    Chlamydia/GC PCR Urine Or Swab [[id number]] Collected:  03/23/20 1914    Order Status:  Completed Specimen:  Genital Updated:  03/23/20 1919     Source Urine    Narrative:       Collected By:[id number] SHIVANI GILBERT    GRAM STAIN [[id number]] Collected:  03/22/20 2300    Order Status:  Completed Specimen:  Body Fluid Updated:  03/22/20 2359     Significant Indicator .     Source BF     Site OTHER BODY FLUID     Gram Stain Result Many WBCs.  No organisms seen.          MRSA nares swab if pneumonia is a concern (ordered/positive/negative/n-a): n/a    Recent Labs     03/23/20  0135 03/24/20  1037   WBC 10.6 8.3   NEUTSPOLYS 64.20 78.00*     Recent Labs     03/23/20  0135 03/24/20  1037   BUN 10 9   CREATININE 0.85 0.63   ALBUMIN 3.8  --      Recent Labs     03/24/20  1037   VANCOTROUGH 13.3     Intake/Output Summary (Last 24 hours) at 3/24/2020 1324  Last data filed at 3/24/2020 0941  Gross per 24 hour   Intake 1440 ml   Output 2 ml   Net 1438 ml      /90   Pulse 66   Temp 36.3 °C (97.3 °F) (Temporal)   Resp 16   Ht 1.753 m (5' 9\")   Wt 105 kg (231 lb 7.7 oz)   SpO2 96%  Temp " (24hrs), Av.6 °C (97.8 °F), Min:36.2 °C (97.1 °F), Max:37 °C (98.6 °F)    Estimated Creatinine Clearance: 165.3 mL/min (by C-G formula based on SCr of 0.63 mg/dL).    A/P   1. Vancomycin dose change: not indicated   2. Next vancomycin level: 3/26/20 @1100 (ordered)  3. Goal trough: 12-16 mcg/mL  4. Comments: VS stable. Afebrile. WBC stable. CrCl ~165 mL/min (SCr stable). Microbiology pending. Most recent vancomycin level noted. Will plan for repeat vancomycin level in ~2-3 days pending clinical course. Recent orthopedic intervention noted. Pharmacy will continue to follow.    Pablo Burgos, PharmD   Home

## 2023-01-12 ENCOUNTER — HOSPITAL ENCOUNTER (EMERGENCY)
Facility: MEDICAL CENTER | Age: 38
End: 2023-01-12
Payer: COMMERCIAL

## 2023-01-12 VITALS
HEART RATE: 99 BPM | OXYGEN SATURATION: 97 % | RESPIRATION RATE: 18 BRPM | WEIGHT: 270.28 LBS | HEIGHT: 69 IN | TEMPERATURE: 96.8 F | BODY MASS INDEX: 40.03 KG/M2 | DIASTOLIC BLOOD PRESSURE: 93 MMHG | SYSTOLIC BLOOD PRESSURE: 136 MMHG

## 2023-01-12 PROCEDURE — 302449 STATCHG TRIAGE ONLY (STATISTIC)

## 2023-01-12 NOTE — ED NOTES
Pt called to go back to a room for the second time with no response. Pt not in lobby, bathroom, or family room.

## 2023-01-12 NOTE — ED NOTES
Patient called from lobby 2x by ED tech and 1x by this RN. Pt was no where to be found in the waiting areas and left after triage without being seen by a provider.

## 2023-01-12 NOTE — ED TRIAGE NOTES
"Chief Complaint   Patient presents with    Other     Pt reports she had a tubal ligation and ablation done 6-7 years ago but feels as though she could be pregnant.        Pt to triage with steady gait for above complaint.     Pt presents in triage reporting sensitive breasts that have expelled a fluid x 5 months. Pt reports a tubal ligation and ablation about 6 years ago.  Pt reports having unprotected sex with boyfriend. Pt denies period since tubal ligation.     Pt back to lobby, educated on triage process and encourage to alert staff of any changes.     BP (!) 136/93   Pulse 99   Temp 36 °C (96.8 °F) (Temporal)   Resp 18   Ht 1.753 m (5' 9\")   Wt 123 kg (270 lb 4.5 oz)   SpO2 97%   BMI 39.91 kg/m²       "

## 2023-02-21 NOTE — PROGRESS NOTES
Infectious Disease Progress Note    Author: Christian Zarate M.D. Date & Time of service: 3/28/2020  2:09 PM    Chief Complaint:  Follow-up for right knee pain    Interval History:  3/24 afebrile, no CBC today, tolerating vancomycin and ceftriaxone.  Underwent I&D of right knee yesterday.  Fluid cultures with negative Gram stain, growth pending  3/26 afebrile, white count 7.2, notes improvement in the right knee pain today.  Cultures no growth till date.  3/28 afebrile, no CBC today, blood cultures positive for gram-negative nasrin.  Patient notes improved pain in the right knee, able to move more.    Labs Reviewed and Medications Reviewed.    Review of Systems:  Review of Systems   Constitutional: Negative for chills, fever and weight loss.   Respiratory: Negative for cough and shortness of breath.    Cardiovascular: Negative for chest pain.   Gastrointestinal: Negative for abdominal pain, diarrhea, nausea and vomiting.   Genitourinary: Negative for dysuria.   Musculoskeletal: Positive for joint pain.   Skin: Negative for itching and rash.   Neurological: Negative for focal weakness and headaches.   All other systems reviewed and are negative.  Improved    Hemodynamics:  Temp (24hrs), Av.1 °C (98.7 °F), Min:36.4 °C (97.5 °F), Max:37.4 °C (99.4 °F)  Temperature: 37.3 °C (99.1 °F)  Pulse  Av.1  Min: 58  Max: 104   Blood Pressure: 136/91       Physical Exam:  Physical Exam  Vitals signs and nursing note reviewed.   Constitutional:       General: She is not in acute distress.     Comments: Disheveled appearance  Hirsute   HENT:      Head: Normocephalic and atraumatic.      Mouth/Throat:      Mouth: Mucous membranes are moist.      Pharynx: No oropharyngeal exudate.      Comments: Poor dentition  Eyes:      General: No scleral icterus.        Right eye: No discharge.         Left eye: No discharge.      Conjunctiva/sclera: Conjunctivae normal.      Pupils: Pupils are equal, round, and reactive to light.   Neck:     Patient greeted, introduced myself as part of his care team.  Patient is currently awake, alert, oriented X 4.  C/O mild dizziness and nausea that began around midnight last night. Blood glucose by EMS PTA was 129 mg/dL. Assessment in progress.       Adriana Reyes RN  02/21/23 2706   Musculoskeletal: Neck supple.   Cardiovascular:      Rate and Rhythm: Normal rate and regular rhythm.      Heart sounds: No murmur.   Pulmonary:      Effort: Pulmonary effort is normal. No respiratory distress.      Breath sounds: Normal breath sounds. No wheezing.   Abdominal:      General: Abdomen is flat. There is no distension.      Palpations: Abdomen is soft.      Tenderness: There is no abdominal tenderness.   Musculoskeletal:         General: Swelling and tenderness present.      Comments: Right knee with improvement in swelling and range of motion   Lymphadenopathy:      Cervical: No cervical adenopathy.   Skin:     General: Skin is warm and dry.      Findings: Rash present. No erythema.      Comments: Multiple areas of hyperpigmented macules, papules, some scabs, lower extremity more than upper extremity, no involvement of palms and soles, no active pustules noted   Neurological:      General: No focal deficit present.      Mental Status: She is alert and oriented to person, place, and time.      Cranial Nerves: No cranial nerve deficit.   Psychiatric:         Mood and Affect: Mood normal.         Behavior: Behavior normal.         Meds:    Current Facility-Administered Medications:   •  ALPRAZolam  •  [START ON 3/29/2020] cefTRIAXone (ROCEPHIN) IV  •  methadone  •  enoxaparin (LOVENOX) injection  •  penicillin G benzathine  •  [DISCONTINUED] senna-docusate **AND** polyethylene glycol/lytes **AND** magnesium hydroxide **AND** bisacodyl  •  Respiratory Therapy Consult  •  LR  •  acetaminophen  •  Notify provider if pain remains uncontrolled **AND** Use the numeric rating scale (NRS-11) on regular floors and Critical-Care Pain Observation Tool (CPOT) on ICUs/Trauma to assess pain **AND** Pulse Ox (Oximetry) **AND** Pharmacy Consult Request **AND** If patient difficult to arouse and/or has respiratory depression, stop any opiates that are currently infusing and call a Rapid Response. **AND** oxyCODONE  "immediate-release **AND** oxyCODONE immediate-release **AND** morphine injection  •  ibuprofen  •  hydrOXYzine HCl  •  loperamide    Labs:  Recent Labs     03/26/20  1106   WBC 7.2   RBC 3.95*   HEMOGLOBIN 11.8*   HEMATOCRIT 34.9*   MCV 88.4   MCH 29.9   RDW 41.7   PLATELETCT 246   MPV 8.7*   NEUTSPOLYS 47.10   LYMPHOCYTES 43.30*   MONOCYTES 7.40   EOSINOPHILS 1.40   BASOPHILS 0.40     Recent Labs     03/26/20  1106   SODIUM 137   POTASSIUM 3.7   CHLORIDE 102   CO2 23   GLUCOSE 89   BUN 10     Recent Labs     03/26/20  1106   CREATININE 0.73       Imaging:  Dx-knee 3 Views Right    Result Date: 3/23/2020  3/23/2020 1:25 PM HISTORY/REASON FOR EXAM:  Atraumatic Pain/Swelling/Deformity. Evaluate for effusion. TECHNIQUE/EXAM DESCRIPTION AND NUMBER OF VIEWS:  2 views of the RIGHT knee. COMPARISON: None FINDINGS: No focal soft tissue swelling. Moderate joint effusion. Joint spaces are preserved. Minimal osteophyte formation at the articular margins. No fracture or dislocation.     1.  Moderate RIGHT knee joint effusion. 2.  No fracture or dislocation. 3.  Minimal degenerative changes.      Micro:  Results     Procedure Component Value Units Date/Time    Blood Culture [[id number]] Collected:  03/28/20 1156    Order Status:  Completed Specimen:  Blood from Peripheral Updated:  03/28/20 1206    Narrative:       Per Hospital Policy: Only change Specimen Src: to \"Line\" if  specified by physician order.    Blood Culture [[id number]] Collected:  03/28/20 1156    Order Status:  Completed Specimen:  Blood from Peripheral Updated:  03/28/20 1206    Narrative:       Per Hospital Policy: Only change Specimen Src: to \"Line\" if  specified by physician order.    BLOOD CULTURE [[id number]] Collected:  03/23/20 0135    Order Status:  Completed Specimen:  Blood from Peripheral Updated:  03/28/20 0900     Significant Indicator NEG     Source BLD     Site PERIPHERAL     Culture Result No growth after 5 days of incubation.    Narrative:       1 " "of 2 for Blood Culture x 2 sites order. Per Hospital  Policy: Only change Specimen Src: to \"Line\" if specified by  physician order.  Right Forearm/Arm    BLOOD CULTURE [[id number]]  (Abnormal) Collected:  03/23/20 0211    Order Status:  Completed Specimen:  Blood from Peripheral Updated:  03/27/20 2352     Significant Indicator POS     Source BLD     Site PERIPHERAL     Culture Result Growth detected by Bactec instrument. 03/27/2020  23:50  Gram Stain: Gram negative rods.      Narrative:       2 of 2 blood culture x2  Sites order. Per Hospital Policy:  Only change Specimen Src: to \"Line\" if specified by physician  order.  Left AC    Anaerobic Culture [[id number]] Collected:  03/24/20 0806    Order Status:  Completed Specimen:  Synovial Updated:  03/27/20 1208     Significant Indicator NEG     Source SYNO     Site Right Knee     Culture Result Culture in progress.    Narrative:       Surgery Specimen    FLUID CULTURE W/GRAM STAIN [[id number]] Collected:  03/24/20 0806    Order Status:  Completed Specimen:  Synovial Updated:  03/27/20 1208     Significant Indicator NEG     Source SYNO     Site Right Knee     Culture Result No growth at 72 hours.     Gram Stain Result Moderate WBCs.  No organisms seen.      Narrative:       Surgery Specimen    FLUID CULTURE W/GRAM STAIN [[id number]] Collected:  03/22/20 2300    Order Status:  Completed Specimen:  Synovial from Other Body Fluid Updated:  03/26/20 1023     Significant Indicator NEG     Source SYNO     Site Right Knee     Culture Result No growth at 72 hours.     Gram Stain Result Many WBCs.  No organisms seen.      Chlamydia/GC PCR Urine Or Swab [[id number]] Collected:  03/23/20 1914    Order Status:  Completed Specimen:  Genital Updated:  03/24/20 1616     C. trachomatis by PCR Negative     N. gonorrhoeae by PCR Negative     Source Urine    Narrative:       Collected By:[id number] SHIVANI GILBERT    GRAM STAIN [[id number]] Collected:  03/24/20 0806    Order Status:  Completed " Specimen:  Synovial Updated:  03/24/20 1155     Significant Indicator .     Source SYNO     Site Right Knee     Gram Stain Result Moderate WBCs.  No organisms seen.      Narrative:       Surgery Specimen    GRAM STAIN [[id number]] Collected:  03/22/20 2300    Order Status:  Completed Specimen:  Body Fluid Updated:  03/22/20 2359     Significant Indicator .     Source BF     Site OTHER BODY FLUID     Gram Stain Result Many WBCs.  No organisms seen.            Assessment:  Kat Devlin is a 34 y.o. female with active IV heroin use, admitted with right knee septic arthritis.  Patient with recent history of gonorrhea, does have multiple skin lesions, including over sites where patient did not inject heroin.     Pertinent Diagnoses:  Right knee septic arthritis  Gram-negative bacteremia  Syphilis  Disseminated gonococcal infection vs. Syphilitic arthritis  IV heroin use  Hepatitis C antibody positive    Plan:  Gram-negative bacteremia  Right knee septic arthritis  Syphilis  Recent gonorrhea  -RPR positive with a very high titer of 1:256  -Patient also provides history of similar knee swelling about 6 months ago, but on the left side, requiring drainage, resolved without a prolonged course of antibiotics.  Though uncommon, this could possibly represent chronic polyarthritis 2/2 syphilis  -Disseminated gonococcal infection also on the differential given her skin lesions as above. Urine gonorrhea/chlamydia NAAT was positive on 3/9, negative on 3/23  -Follow pending OR cultures -no growth till date  -Discussed with microbiology and pathology. Unable to obtain immunohistochemistry from the joint fluid to look for Treponema  -Now, blood culture from 3/23+ for gram-negative rods  -Follow repeat blood cultures from today  -Continue IV ceftriaxone 2 g every 24 hours   -Received 1 dose of p.o. azithromycin 1 g  -We will go ahead and initiate 2,400,000 units of IM benzathine penicillin weekly x3 for her syphilis (received first  dose on 3/24)    Hepatitis C  -Positive quantitative PCR.  Consider treatment outpatient    IV heroin use  -HIV negative    Discussed with primary, Dr. Robb    ID will follow.  Please call with questions.

## 2024-09-25 NOTE — ED PROVIDER NOTES
"ED Provider Note    CHIEF COMPLAINT  Chief Complaint   Patient presents with   • Alleged Assault     \"I got a flashlight thrown at me\" by ex-boyfriend, laceration to right scalp, bleeding controlled, denies LOC   • Scalp Laceration     Left       [facility]  Isidro Segura is a 31 y.o. female who presentsFor evaluation of a scalp laceration. The patient states that she had a flashlight thrown at her and this resulted in a laceration to her scalp. She had no loss of consciousness. She states she is up-to-date on her tetanus. She denies any numbness or weakness.    REVIEW OF SYSTEMS  See HPI for further details. All other systems are negative.     PAST MEDICAL HISTORY  Past Medical History:   Diagnosis Date   • Pain 1/2012    bilat. feet 6/10   • Headache 7/25/2011   • Back pain 6/21/2011   • Arthritis     knee and back   • ASTHMA     rarely. uses inhaler exercise inducted   • Migraines    • Obesity    • PCOS (polycystic ovarian syndrome)    • Psychiatric disorder     depression and anxiety   • Type II or unspecified type diabetes mellitus without mention of complication, not stated as uncontrolled     diet and oral meds       FAMILY HISTORY  Family History   Problem Relation Age of Onset   • Non-contributory Mother    • Hyperlipidemia Mother    • Non-contributory Father    • Alcohol/Drug Father    • Arthritis Maternal Grandmother    • Hyperlipidemia Maternal Grandmother    • Heart Disease Maternal Grandfather        SOCIAL HISTORY  Social History     Social History   • Marital status: Single     Spouse name: N/A   • Number of children: N/A   • Years of education: N/A     Social History Main Topics   • Smoking status: Current Every Day Smoker     Packs/day: 1.00     Years: 18.00     Types: Cigarettes   • Smokeless tobacco: Never Used   • Alcohol use No   • Drug use:      Types: Intravenous      Comment: Heroin injects in arms only   • Sexual activity: Yes     Partners: Male     Other Topics Concern   • Not on file     Social " History Narrative    ** Merged History Encounter **            SURGICAL HISTORY  Past Surgical History:   Procedure Laterality Date   • TUBAL COAGULATION LAPAROSCOPIC BILATERAL  2015    Performed by Juan R Sutton M.D. at SURGERY SAME DAY [facility] ORS   • DILATION AND CURETTAGE  2015    Performed by Juan R Sutton M.D. at SURGERY SAME DAY [facility] ORS   • HYSTEROSCOPY NOVASURE-2  2015    Performed by Juan R Sutton M.D. at SURGERY SAME DAY [facility] ORS   • OTHER  2014    oral surgery in office   • REPEAT C SECTION  2012    Performed by DESIREE TUCKER at LABOR AND DELIVERY   • OTHER      bilateral knee surgery   • [place]  DELIVERY ONLY         CURRENT MEDICATIONS  Home Medications     Reviewed by Meche Ramirez R.N. (Registered Nurse) on 17 at [facility] [employer]  Med List Status: Complete   Medication Last Dose Status        Patient Devin Taking any Medications                       ALLERGIES  No Known Allergies    PHYSICAL EXAM  VITAL SIGNS: /68   Pulse 98   Temp 37.1 °C (98.7 °F)   Resp 17   Ht 1.753 m (5' 9\")   Wt 95.8 kg (211 lb 3.2 oz)   SpO2 97%   BMI 31.19 kg/m²     Constitutional: Well developed, Well nourished, No acute distress, Non-toxic appearance.   HENT: Normocephalic,Half centimeter curvilinear laceration of the right temporal region. No active bleeding.  Eyes:  EOMI, Conjunctiva normal, No discharge.   Cardiovascular: Normal heart rate.   Thorax & Lungs: No respiratory distress.  Skin: Warm, Dry.   Musculoskeletal: Good range of motion in all major joints.  Neurologic: Awake alert, No focal deficits noted.       Laceration Repair Procedure Note    Indication: Laceration    Procedure: The patient was placed in the appropriate position and anesthesia around the laceration was obtained by infiltration using 2% Lidocaine with epinephrine. The area was then thoroughly washed with normal saline. The laceration was closed with staples. There were no additional " lacerations requiring repair.     Total repaired wound length: 2.5 cm.     Other Items: None    The patient tolerated the procedure well.    Complications: None          COURSE & MEDICAL DECISION MAKING  Pertinent Labs & Imaging studies reviewed. (See chart for details)  This 31-year-old here for evaluation of scalp laceration. The laceration is closed with the procedure note above. She is neurologically intact and I see no indication for imaging. She is up-to-date on her tetanus. I informed her that the staples will need to be removed in 10 days. I referred her to the [place] clinic for follow-up. She is given a discharge instruction sheet on laceration care.    FINAL IMPRESSION  1. Scalp laceration  2.   3.         Electronically signed by: Osito Coker, 9/28/2017 11:17 AM

## 2025-03-29 ENCOUNTER — APPOINTMENT (OUTPATIENT)
Dept: RADIOLOGY | Facility: MEDICAL CENTER | Age: 40
DRG: 872 | End: 2025-03-29
Attending: EMERGENCY MEDICINE
Payer: MEDICAID

## 2025-03-29 ENCOUNTER — HOSPITAL ENCOUNTER (INPATIENT)
Facility: MEDICAL CENTER | Age: 40
LOS: 2 days | DRG: 872 | End: 2025-03-31
Attending: EMERGENCY MEDICINE | Admitting: STUDENT IN AN ORGANIZED HEALTH CARE EDUCATION/TRAINING PROGRAM
Payer: MEDICAID

## 2025-03-29 ENCOUNTER — OFFICE VISIT (OUTPATIENT)
Dept: URGENT CARE | Facility: CLINIC | Age: 40
End: 2025-03-29
Payer: MEDICAID

## 2025-03-29 ENCOUNTER — HOSPITAL ENCOUNTER (OUTPATIENT)
Facility: MEDICAL CENTER | Age: 40
End: 2025-03-29
Attending: FAMILY MEDICINE
Payer: MEDICAID

## 2025-03-29 VITALS
WEIGHT: 255.8 LBS | HEIGHT: 69 IN | TEMPERATURE: 98.3 F | OXYGEN SATURATION: 97 % | BODY MASS INDEX: 37.89 KG/M2 | RESPIRATION RATE: 16 BRPM | HEART RATE: 93 BPM

## 2025-03-29 DIAGNOSIS — N39.0 URINARY TRACT INFECTION WITH HEMATURIA, SITE UNSPECIFIED: ICD-10-CM

## 2025-03-29 DIAGNOSIS — N30.00 ACUTE CYSTITIS WITHOUT HEMATURIA: ICD-10-CM

## 2025-03-29 DIAGNOSIS — L03.116 CELLULITIS OF LEFT LOWER EXTREMITY: ICD-10-CM

## 2025-03-29 DIAGNOSIS — L30.4 INTERTRIGO: ICD-10-CM

## 2025-03-29 DIAGNOSIS — R31.9 URINARY TRACT INFECTION WITH HEMATURIA, SITE UNSPECIFIED: ICD-10-CM

## 2025-03-29 DIAGNOSIS — D72.829 LEUKOCYTOSIS, UNSPECIFIED TYPE: ICD-10-CM

## 2025-03-29 PROBLEM — Z71.6 TOBACCO ABUSE COUNSELING: Status: ACTIVE | Noted: 2025-03-29

## 2025-03-29 PROBLEM — F19.10 SUBSTANCE ABUSE (HCC): Status: ACTIVE | Noted: 2025-03-29

## 2025-03-29 PROBLEM — N17.9 AKI (ACUTE KIDNEY INJURY) (HCC): Status: ACTIVE | Noted: 2025-03-29

## 2025-03-29 PROBLEM — R60.0 BILATERAL LEG EDEMA: Status: ACTIVE | Noted: 2025-03-29

## 2025-03-29 PROBLEM — A41.9 SEPSIS DUE TO CELLULITIS (HCC): Status: ACTIVE | Noted: 2017-05-06

## 2025-03-29 LAB
ALBUMIN SERPL BCP-MCNC: 3.7 G/DL (ref 3.2–4.9)
ALBUMIN/GLOB SERPL: 0.8 G/DL
ALP SERPL-CCNC: 91 U/L (ref 30–99)
ALT SERPL-CCNC: 15 U/L (ref 2–50)
ANION GAP SERPL CALC-SCNC: 16 MMOL/L (ref 7–16)
APPEARANCE UR: ABNORMAL
APPEARANCE UR: NORMAL
AST SERPL-CCNC: 21 U/L (ref 12–45)
BACTERIA #/AREA URNS HPF: ABNORMAL /HPF
BASOPHILS # BLD AUTO: 0.3 % (ref 0–1.8)
BASOPHILS # BLD: 0.04 K/UL (ref 0–0.12)
BILIRUB SERPL-MCNC: 1.4 MG/DL (ref 0.1–1.5)
BILIRUB UR QL STRIP.AUTO: ABNORMAL
BILIRUB UR STRIP-MCNC: NORMAL MG/DL
BUN SERPL-MCNC: 21 MG/DL (ref 8–22)
CA OXALATE CRYSTAL  1765: PRESENT /HPF
CALCIUM ALBUM COR SERPL-MCNC: 9.8 MG/DL (ref 8.5–10.5)
CALCIUM SERPL-MCNC: 9.6 MG/DL (ref 8.5–10.5)
CASTS URNS QL MICRO: >20 /LPF (ref 0–2)
CHLORIDE SERPL-SCNC: 99 MMOL/L (ref 96–112)
CO2 SERPL-SCNC: 16 MMOL/L (ref 20–33)
COLOR UR AUTO: NORMAL
COLOR UR: ABNORMAL
CORTIS SERPL-MCNC: 27.4 UG/DL (ref 0–23)
CREAT SERPL-MCNC: 1.72 MG/DL (ref 0.5–1.4)
CRP SERPL HS-MCNC: 26.2 MG/DL (ref 0–0.75)
EOSINOPHIL # BLD AUTO: 0.04 K/UL (ref 0–0.51)
EOSINOPHIL NFR BLD: 0.3 % (ref 0–6.9)
EPITHELIAL CELLS 1715: ABNORMAL /HPF (ref 0–5)
ERYTHROCYTE [DISTWIDTH] IN BLOOD BY AUTOMATED COUNT: 39.9 FL (ref 35.9–50)
ERYTHROCYTE [SEDIMENTATION RATE] IN BLOOD BY WESTERGREN METHOD: 67 MM/HOUR (ref 0–25)
EST. AVERAGE GLUCOSE BLD GHB EST-MCNC: 131 MG/DL
ETHANOL BLD-MCNC: <10.1 MG/DL
GFR SERPLBLD CREATININE-BSD FMLA CKD-EPI: 39 ML/MIN/1.73 M 2
GLOBULIN SER CALC-MCNC: 4.6 G/DL (ref 1.9–3.5)
GLUCOSE BLD STRIP.AUTO-MCNC: 129 MG/DL (ref 65–99)
GLUCOSE SERPL-MCNC: 132 MG/DL (ref 65–99)
GLUCOSE UR STRIP.AUTO-MCNC: NEGATIVE MG/DL
GLUCOSE UR STRIP.AUTO-MCNC: NEGATIVE MG/DL
HBA1C MFR BLD: 6.2 % (ref 4–5.6)
HCG SERPL QL: NEGATIVE
HCT VFR BLD AUTO: 43.2 % (ref 37–47)
HGB BLD-MCNC: 15.1 G/DL (ref 12–16)
HYALINE CAST   1831: PRESENT /LPF
IMM GRANULOCYTES # BLD AUTO: 0.06 K/UL (ref 0–0.11)
IMM GRANULOCYTES NFR BLD AUTO: 0.4 % (ref 0–0.9)
INR PPP: 1.37 (ref 0.87–1.13)
KETONES UR STRIP.AUTO-MCNC: ABNORMAL MG/DL
KETONES UR STRIP.AUTO-MCNC: NORMAL MG/DL
LACTATE SERPL-SCNC: 1.2 MMOL/L (ref 0.5–2)
LACTATE SERPL-SCNC: 2.8 MMOL/L (ref 0.5–2)
LDH SERPL L TO P-CCNC: 544 U/L (ref 107–266)
LEUKOCYTE ESTERASE UR QL STRIP.AUTO: ABNORMAL
LEUKOCYTE ESTERASE UR QL STRIP.AUTO: NORMAL
LIPASE SERPL-CCNC: 12 U/L (ref 11–82)
LYMPHOCYTES # BLD AUTO: 1.11 K/UL (ref 1–4.8)
LYMPHOCYTES NFR BLD: 7.2 % (ref 22–41)
MAGNESIUM SERPL-MCNC: 1.6 MG/DL (ref 1.5–2.5)
MCH RBC QN AUTO: 31.9 PG (ref 27–33)
MCHC RBC AUTO-ENTMCNC: 35 G/DL (ref 32.2–35.5)
MCV RBC AUTO: 91.3 FL (ref 81.4–97.8)
MICRO URNS: ABNORMAL
MONOCYTES # BLD AUTO: 0.71 K/UL (ref 0–0.85)
MONOCYTES NFR BLD AUTO: 4.6 % (ref 0–13.4)
NEUTROPHILS # BLD AUTO: 13.43 K/UL (ref 1.82–7.42)
NEUTROPHILS NFR BLD: 87.2 % (ref 44–72)
NITRITE UR QL STRIP.AUTO: POSITIVE
NITRITE UR QL STRIP.AUTO: POSITIVE
NRBC # BLD AUTO: 0 K/UL
NRBC BLD-RTO: 0 /100 WBC (ref 0–0.2)
PH UR STRIP.AUTO: 5 [PH] (ref 5–8)
PH UR STRIP.AUTO: 5 [PH] (ref 5–8)
PLATELET # BLD AUTO: 261 K/UL (ref 164–446)
PMV BLD AUTO: 9.1 FL (ref 9–12.9)
POTASSIUM SERPL-SCNC: 3.8 MMOL/L (ref 3.6–5.5)
PROCALCITONIN SERPL-MCNC: 1.89 NG/ML
PROT SERPL-MCNC: 8.3 G/DL (ref 6–8.2)
PROT UR QL STRIP: 100 MG/DL
PROT UR QL STRIP: NORMAL MG/DL
PROTHROMBIN TIME: 16.9 SEC (ref 12–14.6)
RBC # BLD AUTO: 4.73 M/UL (ref 4.2–5.4)
RBC # URNS HPF: ABNORMAL /HPF (ref 0–2)
RBC UR QL AUTO: NEGATIVE
RBC UR QL AUTO: NEGATIVE
RPR SER QL: REACTIVE
RPR SER-TITR: NORMAL {TITER}
SCCMEC + MECA PNL NOSE NAA+PROBE: NEGATIVE
SODIUM SERPL-SCNC: 131 MMOL/L (ref 135–145)
SP GR UR STRIP.AUTO: 1.03
SP GR UR STRIP.AUTO: >1.03
T PALLIDUM AB SER QL IA: REACTIVE
T VAGINALIS URNS QL MICRO: PRESENT /HPF
UROBILINOGEN UR STRIP-MCNC: NORMAL MG/DL
UROBILINOGEN UR STRIP.AUTO-MCNC: 2 EU/DL
WBC # BLD AUTO: 15.4 K/UL (ref 4.8–10.8)
WBC #/AREA URNS HPF: ABNORMAL /HPF

## 2025-03-29 PROCEDURE — 700111 HCHG RX REV CODE 636 W/ 250 OVERRIDE (IP): Performed by: EMERGENCY MEDICINE

## 2025-03-29 PROCEDURE — 85652 RBC SED RATE AUTOMATED: CPT

## 2025-03-29 PROCEDURE — 83690 ASSAY OF LIPASE: CPT

## 2025-03-29 PROCEDURE — 84703 CHORIONIC GONADOTROPIN ASSAY: CPT

## 2025-03-29 PROCEDURE — 86592 SYPHILIS TEST NON-TREP QUAL: CPT

## 2025-03-29 PROCEDURE — 87040 BLOOD CULTURE FOR BACTERIA: CPT

## 2025-03-29 PROCEDURE — 700111 HCHG RX REV CODE 636 W/ 250 OVERRIDE (IP): Performed by: STUDENT IN AN ORGANIZED HEALTH CARE EDUCATION/TRAINING PROGRAM

## 2025-03-29 PROCEDURE — 99223 1ST HOSP IP/OBS HIGH 75: CPT | Mod: 25 | Performed by: STUDENT IN AN ORGANIZED HEALTH CARE EDUCATION/TRAINING PROGRAM

## 2025-03-29 PROCEDURE — 87186 SC STD MICRODIL/AGAR DIL: CPT

## 2025-03-29 PROCEDURE — 83615 LACTATE (LD) (LDH) ENZYME: CPT

## 2025-03-29 PROCEDURE — 86140 C-REACTIVE PROTEIN: CPT

## 2025-03-29 PROCEDURE — 81001 URINALYSIS AUTO W/SCOPE: CPT

## 2025-03-29 PROCEDURE — 71045 X-RAY EXAM CHEST 1 VIEW: CPT

## 2025-03-29 PROCEDURE — 83735 ASSAY OF MAGNESIUM: CPT

## 2025-03-29 PROCEDURE — 96374 THER/PROPH/DIAG INJ IV PUSH: CPT

## 2025-03-29 PROCEDURE — 87641 MR-STAPH DNA AMP PROBE: CPT

## 2025-03-29 PROCEDURE — 80053 COMPREHEN METABOLIC PANEL: CPT

## 2025-03-29 PROCEDURE — 87077 CULTURE AEROBIC IDENTIFY: CPT

## 2025-03-29 PROCEDURE — 83605 ASSAY OF LACTIC ACID: CPT | Mod: 91

## 2025-03-29 PROCEDURE — 84145 PROCALCITONIN (PCT): CPT

## 2025-03-29 PROCEDURE — 85610 PROTHROMBIN TIME: CPT

## 2025-03-29 PROCEDURE — 82533 TOTAL CORTISOL: CPT

## 2025-03-29 PROCEDURE — 85025 COMPLETE CBC W/AUTO DIFF WBC: CPT

## 2025-03-29 PROCEDURE — A9270 NON-COVERED ITEM OR SERVICE: HCPCS | Performed by: STUDENT IN AN ORGANIZED HEALTH CARE EDUCATION/TRAINING PROGRAM

## 2025-03-29 PROCEDURE — 99407 BEHAV CHNG SMOKING > 10 MIN: CPT | Performed by: STUDENT IN AN ORGANIZED HEALTH CARE EDUCATION/TRAINING PROGRAM

## 2025-03-29 PROCEDURE — 99285 EMERGENCY DEPT VISIT HI MDM: CPT

## 2025-03-29 PROCEDURE — 82962 GLUCOSE BLOOD TEST: CPT

## 2025-03-29 PROCEDURE — 36415 COLL VENOUS BLD VENIPUNCTURE: CPT

## 2025-03-29 PROCEDURE — 86593 SYPHILIS TEST NON-TREP QUANT: CPT

## 2025-03-29 PROCEDURE — 770006 HCHG ROOM/CARE - MED/SURG/GYN SEMI*

## 2025-03-29 PROCEDURE — 700102 HCHG RX REV CODE 250 W/ 637 OVERRIDE(OP): Performed by: STUDENT IN AN ORGANIZED HEALTH CARE EDUCATION/TRAINING PROGRAM

## 2025-03-29 PROCEDURE — 87086 URINE CULTURE/COLONY COUNT: CPT

## 2025-03-29 PROCEDURE — 86780 TREPONEMA PALLIDUM: CPT

## 2025-03-29 PROCEDURE — 82077 ASSAY SPEC XCP UR&BREATH IA: CPT

## 2025-03-29 PROCEDURE — 99407 BEHAV CHNG SMOKING > 10 MIN: CPT

## 2025-03-29 PROCEDURE — 83036 HEMOGLOBIN GLYCOSYLATED A1C: CPT

## 2025-03-29 PROCEDURE — 700105 HCHG RX REV CODE 258: Performed by: STUDENT IN AN ORGANIZED HEALTH CARE EDUCATION/TRAINING PROGRAM

## 2025-03-29 RX ORDER — CEFAZOLIN 2 G/1
2 INJECTION, POWDER, FOR SOLUTION INTRAMUSCULAR; INTRAVENOUS ONCE
Status: COMPLETED | OUTPATIENT
Start: 2025-03-29 | End: 2025-03-29

## 2025-03-29 RX ORDER — HYDROMORPHONE HYDROCHLORIDE 2 MG/ML
0.5 INJECTION, SOLUTION INTRAMUSCULAR; INTRAVENOUS; SUBCUTANEOUS
Status: DISCONTINUED | OUTPATIENT
Start: 2025-03-29 | End: 2025-03-31 | Stop reason: HOSPADM

## 2025-03-29 RX ORDER — LINEZOLID 2 MG/ML
600 INJECTION, SOLUTION INTRAVENOUS EVERY 12 HOURS
Status: DISCONTINUED | OUTPATIENT
Start: 2025-03-29 | End: 2025-03-30

## 2025-03-29 RX ORDER — HYDROXYZINE HYDROCHLORIDE 50 MG/1
50 TABLET, FILM COATED ORAL 3 TIMES DAILY PRN
Status: DISCONTINUED | OUTPATIENT
Start: 2025-03-29 | End: 2025-03-31 | Stop reason: HOSPADM

## 2025-03-29 RX ORDER — PROCHLORPERAZINE EDISYLATE 5 MG/ML
5-10 INJECTION INTRAMUSCULAR; INTRAVENOUS EVERY 4 HOURS PRN
Status: DISCONTINUED | OUTPATIENT
Start: 2025-03-29 | End: 2025-03-31 | Stop reason: HOSPADM

## 2025-03-29 RX ORDER — SODIUM CHLORIDE, SODIUM LACTATE, POTASSIUM CHLORIDE, AND CALCIUM CHLORIDE .6; .31; .03; .02 G/100ML; G/100ML; G/100ML; G/100ML
2000 INJECTION, SOLUTION INTRAVENOUS ONCE
Status: COMPLETED | OUTPATIENT
Start: 2025-03-29 | End: 2025-03-29

## 2025-03-29 RX ORDER — SODIUM CHLORIDE, SODIUM LACTATE, POTASSIUM CHLORIDE, AND CALCIUM CHLORIDE .6; .31; .03; .02 G/100ML; G/100ML; G/100ML; G/100ML
500 INJECTION, SOLUTION INTRAVENOUS
Status: DISCONTINUED | OUTPATIENT
Start: 2025-03-29 | End: 2025-03-31 | Stop reason: HOSPADM

## 2025-03-29 RX ORDER — ONDANSETRON 4 MG/1
4 TABLET, ORALLY DISINTEGRATING ORAL EVERY 4 HOURS PRN
Status: DISCONTINUED | OUTPATIENT
Start: 2025-03-29 | End: 2025-03-31 | Stop reason: HOSPADM

## 2025-03-29 RX ORDER — AMOXICILLIN 250 MG
2 CAPSULE ORAL EVERY EVENING
Status: DISCONTINUED | OUTPATIENT
Start: 2025-03-29 | End: 2025-03-31 | Stop reason: HOSPADM

## 2025-03-29 RX ORDER — OXYCODONE HYDROCHLORIDE 5 MG/1
5 TABLET ORAL
Refills: 0 | Status: DISCONTINUED | OUTPATIENT
Start: 2025-03-29 | End: 2025-03-30

## 2025-03-29 RX ORDER — IPRATROPIUM BROMIDE AND ALBUTEROL SULFATE 2.5; .5 MG/3ML; MG/3ML
3 SOLUTION RESPIRATORY (INHALATION)
Status: DISCONTINUED | OUTPATIENT
Start: 2025-03-29 | End: 2025-03-31 | Stop reason: HOSPADM

## 2025-03-29 RX ORDER — OXYCODONE HYDROCHLORIDE 10 MG/1
10 TABLET ORAL
Refills: 0 | Status: DISCONTINUED | OUTPATIENT
Start: 2025-03-29 | End: 2025-03-31 | Stop reason: HOSPADM

## 2025-03-29 RX ORDER — SODIUM CHLORIDE, SODIUM LACTATE, POTASSIUM CHLORIDE, CALCIUM CHLORIDE 600; 310; 30; 20 MG/100ML; MG/100ML; MG/100ML; MG/100ML
INJECTION, SOLUTION INTRAVENOUS CONTINUOUS
Status: DISCONTINUED | OUTPATIENT
Start: 2025-03-29 | End: 2025-03-29

## 2025-03-29 RX ORDER — ACETAMINOPHEN 325 MG/1
650 TABLET ORAL EVERY 6 HOURS PRN
Status: DISCONTINUED | OUTPATIENT
Start: 2025-03-29 | End: 2025-03-29

## 2025-03-29 RX ORDER — HEPARIN SODIUM 5000 [USP'U]/ML
5000 INJECTION, SOLUTION INTRAVENOUS; SUBCUTANEOUS EVERY 8 HOURS
Status: DISCONTINUED | OUTPATIENT
Start: 2025-03-29 | End: 2025-03-30

## 2025-03-29 RX ORDER — NICOTINE 21 MG/24HR
21 PATCH, TRANSDERMAL 24 HOURS TRANSDERMAL
Status: DISCONTINUED | OUTPATIENT
Start: 2025-03-29 | End: 2025-03-29

## 2025-03-29 RX ORDER — NYSTATIN 100000 [USP'U]/G
POWDER TOPICAL 3 TIMES DAILY
Status: DISCONTINUED | OUTPATIENT
Start: 2025-03-30 | End: 2025-03-31 | Stop reason: HOSPADM

## 2025-03-29 RX ORDER — ACETAMINOPHEN 325 MG/1
650 TABLET ORAL EVERY 6 HOURS
Status: DISCONTINUED | OUTPATIENT
Start: 2025-03-29 | End: 2025-03-31 | Stop reason: HOSPADM

## 2025-03-29 RX ORDER — ACETAMINOPHEN 325 MG/1
650 TABLET ORAL EVERY 6 HOURS PRN
Status: DISCONTINUED | OUTPATIENT
Start: 2025-04-03 | End: 2025-03-31 | Stop reason: HOSPADM

## 2025-03-29 RX ORDER — INSULIN LISPRO 100 [IU]/ML
2-9 INJECTION, SOLUTION INTRAVENOUS; SUBCUTANEOUS
Status: DISCONTINUED | OUTPATIENT
Start: 2025-03-29 | End: 2025-03-30

## 2025-03-29 RX ORDER — PROMETHAZINE HYDROCHLORIDE 25 MG/1
12.5-25 SUPPOSITORY RECTAL EVERY 4 HOURS PRN
Status: DISCONTINUED | OUTPATIENT
Start: 2025-03-29 | End: 2025-03-31 | Stop reason: HOSPADM

## 2025-03-29 RX ORDER — PROMETHAZINE HYDROCHLORIDE 25 MG/1
12.5-25 TABLET ORAL EVERY 4 HOURS PRN
Status: DISCONTINUED | OUTPATIENT
Start: 2025-03-29 | End: 2025-03-31 | Stop reason: HOSPADM

## 2025-03-29 RX ORDER — POLYETHYLENE GLYCOL 3350 17 G/17G
1 POWDER, FOR SOLUTION ORAL
Status: DISCONTINUED | OUTPATIENT
Start: 2025-03-29 | End: 2025-03-31 | Stop reason: HOSPADM

## 2025-03-29 RX ORDER — ATORVASTATIN CALCIUM 40 MG/1
40 TABLET, FILM COATED ORAL EVERY EVENING
Status: DISCONTINUED | OUTPATIENT
Start: 2025-03-29 | End: 2025-03-31 | Stop reason: HOSPADM

## 2025-03-29 RX ORDER — DEXTROSE MONOHYDRATE 25 G/50ML
25 INJECTION, SOLUTION INTRAVENOUS
Status: DISCONTINUED | OUTPATIENT
Start: 2025-03-29 | End: 2025-03-30

## 2025-03-29 RX ORDER — ONDANSETRON 2 MG/ML
4 INJECTION INTRAMUSCULAR; INTRAVENOUS EVERY 4 HOURS PRN
Status: DISCONTINUED | OUTPATIENT
Start: 2025-03-29 | End: 2025-03-31 | Stop reason: HOSPADM

## 2025-03-29 RX ORDER — ASPIRIN 81 MG/1
81 TABLET ORAL DAILY
Status: DISCONTINUED | OUTPATIENT
Start: 2025-03-30 | End: 2025-03-30

## 2025-03-29 RX ORDER — LABETALOL HYDROCHLORIDE 5 MG/ML
10 INJECTION, SOLUTION INTRAVENOUS EVERY 4 HOURS PRN
Status: DISCONTINUED | OUTPATIENT
Start: 2025-03-29 | End: 2025-03-31 | Stop reason: HOSPADM

## 2025-03-29 RX ADMIN — SODIUM CHLORIDE, POTASSIUM CHLORIDE, SODIUM LACTATE AND CALCIUM CHLORIDE 2000 ML: 600; 310; 30; 20 INJECTION, SOLUTION INTRAVENOUS at 17:35

## 2025-03-29 RX ADMIN — ATORVASTATIN CALCIUM 40 MG: 40 TABLET, FILM COATED ORAL at 20:57

## 2025-03-29 RX ADMIN — OXYCODONE HYDROCHLORIDE 10 MG: 10 TABLET ORAL at 22:09

## 2025-03-29 RX ADMIN — HEPARIN SODIUM 5000 UNITS: 5000 INJECTION, SOLUTION INTRAVENOUS; SUBCUTANEOUS at 20:57

## 2025-03-29 RX ADMIN — CEFAZOLIN 2 G: 2 INJECTION, POWDER, FOR SOLUTION INTRAMUSCULAR; INTRAVENOUS at 16:57

## 2025-03-29 RX ADMIN — ACETAMINOPHEN 650 MG: 325 TABLET ORAL at 21:55

## 2025-03-29 RX ADMIN — SENNOSIDES AND DOCUSATE SODIUM 2 TABLET: 50; 8.6 TABLET ORAL at 21:54

## 2025-03-29 ASSESSMENT — ENCOUNTER SYMPTOMS
HEARTBURN: 0
DOUBLE VISION: 0
BRUISES/BLEEDS EASILY: 0
PALPITATIONS: 1
FEVER: 1
NAUSEA: 0
CHILLS: 1
WEAKNESS: 1
HEADACHES: 0
COUGH: 0
VOMITING: 0
FEVER: 1
DEPRESSION: 0
SHORTNESS OF BREATH: 0
MYALGIAS: 1
DIZZINESS: 0
NERVOUS/ANXIOUS: 1
FOCAL WEAKNESS: 0
BLURRED VISION: 0
ABDOMINAL PAIN: 0
MYALGIAS: 1

## 2025-03-29 ASSESSMENT — LIFESTYLE VARIABLES: SUBSTANCE_ABUSE: 1

## 2025-03-29 ASSESSMENT — PAIN DESCRIPTION - PAIN TYPE
TYPE: ACUTE PAIN

## 2025-03-29 NOTE — PROGRESS NOTES
"Subjective:     Kat Devlin is a 39 y.o. female who presents for Other (Body aches, infection on left middle finger ,left leg is swollen , left and right toes are infected, patient also has concerned that her urine is a \" coffee color\", white stuff is also in her urine )    HPI  Pt presents for evaluation of an acute problem  Patient with pain in the fingers of bilateral hands and more in the left third finger  Also has pain in bilateral feet/toes with large amount of pain in the left shin/calf  Has large amount of erythema throughout the left shin and very painful to touch  Symptoms have been slowly worsening every day for at least a week  Has also noticed that urine is very dark  States that the urine is a \"coffee color\" and with \"white stuff\" in it  States that she feels whole body fatigue and myalgia  Feeling chills and thinks she has had fevers  History of IV drug use in the past, and denies any IV use for over a year    Review of Systems   Constitutional:  Positive for fever and malaise/fatigue.   Musculoskeletal:  Positive for myalgias.   Skin:  Positive for rash.     PMH:  has a past medical history of Arthritis, ASTHMA, Back pain (6/21/2011), Headache(784.0) (7/25/2011), Migraines, Obesity, Pain (1/2012), PCOS (polycystic ovarian syndrome), Psychiatric disorder, and Type II or unspecified type diabetes mellitus without mention of complication, not stated as uncontrolled.  MEDS: No current outpatient medications on file.  ALLERGIES: No Known Allergies  SURGHX:   Past Surgical History:   Procedure Laterality Date    PB KNEE SCOPE,DIAGNOSTIC Right 3/24/2020    Procedure: ARTHROSCOPY, KNEE;  Surgeon: Jorge Paulino M.D.;  Location: SURGERY [facility];  Service: Orthopedics    TUBAL COAGULATION LAPAROSCOPIC BILATERAL  2/20/2015    Performed by Juan R Sutton M.D. at SURGERY SAME DAY [facility]    DILATION AND CURETTAGE  2/20/2015    Performed by Juan R Sutton M.D. at SURGERY SAME DAY [facility]" "ORS    HYSTEROSCOPY NOVASURE-2  2015    Performed by Juan R Sutton M.D. at SURGERY SAME DAY [facility] ORS    OTHER  2014    oral surgery in office    REPEAT C SECTION  2012    Performed by DESIREE TUCKER at LABOR AND DELIVERY    OTHER      bilateral knee surgery    [place]  DELIVERY ONLY       SOCHX:  reports that she has been smoking cigarettes. She has a 18 pack-year smoking history. She has never used smokeless tobacco. She reports current drug use. Drug: Intravenous. She reports that she does not drink alcohol.     Objective:   Pulse 93   Temp 36.8 °C (98.3 °F) (Temporal)   Resp 16   Ht 1.753 m (5' 9\")   Wt 116 kg (255 lb 12.8 oz)   SpO2 97%   BMI 37.78 kg/m²     Physical Exam  Constitutional:       General: She is not in acute distress.     Appearance: She is well-developed. She is not diaphoretic.   Pulmonary:      Effort: Pulmonary effort is normal.   Skin:     Comments: Left leg with moderate edema and erythema  two thirds of the way up shin.  Area is markedly tender to palpation.  No open wound present.  Bilateral fingers with multiple small scabbed wounds and dry skin, left third finger with moderate erythema throughout the fingertip   Neurological:      Mental Status: She is alert.     She  Assessment/Plan:   Assessment    1. Cellulitis of left lower extremity    2. Acute cystitis without hematuria  - POCT Urinalysis  - URINE CULTURE(NEW); Future    Patient with cellulitis of left lower extremity, acute cystitis, and multiple other small wounds and areas of minor developing infection in the hands/feet.  Reviewed the risks and benefit of trying outpatient treatment versus triage to ER for better workup.  Severity of cellulitis in the left shin could be sign of associated DVT or osteomyelitis.  Patient is not a good candidate for outpatient management, and she much prefers being seen at ER.  This is reasonable based on severity of leg cellulitis and systemic symptoms.  Offered to " call EMS or other transport for patient and she refuses.  Hospital is about 2 blocks away and patient states that she much prefers walking.  She states that she will go and will not change her mind.  No medication sent to pharmacy at this time pending ER workup.

## 2025-03-29 NOTE — ED NOTES
Pt ambulated to room Yellow 58 with steady gait. Pt changed into gown and placed on monitors. Chart up for ERP.     Urine sample collected and sent to lab.

## 2025-03-29 NOTE — ED TRIAGE NOTES
Chief Complaint   Patient presents with    Wound Check     Bilateral foot swelling, left hand, and left leg.     Blood in Urine     Patient reports brown dark urine.     Sent from Urgent Care     Patient sent for concern of Cellulitis of left lower extremity and Acute cystitis without hematuria per notes.       Abdominal Pain

## 2025-03-29 NOTE — ED PROVIDER NOTES
ER Provider Note    Scribed for Mani Jenkins D.O. by Sita Donaldson. 3/29/2025  3:59 PM    Primary Care Provider: Pcp Pt States None    CHIEF COMPLAINT  Chief Complaint   Patient presents with    Wound Check     Bilateral foot swelling, left hand, and left leg.     Blood in Urine     Patient reports brown dark urine.     Sent from Urgent Care     Patient sent for concern of Cellulitis of left lower extremity and Acute cystitis without hematuria per notes.       Abdominal Pain       HPI/ROS  LIMITATION TO HISTORY   None    OUTSIDE HISTORIAN(S):  None    Kat Devlin is a 39 y.o. female who presents to the Emergency Department for bilateral left foot, hand and leg swelling onset yesterday. Patient describes that there is mild pain in her left hand and more significant pain in her left leg. She denies fevers, chills, nausea, or vomiting. She denies having diagnosed diabetes and is not on any medications. [place] unsure why she is not going to a shelter to stay.     ROS as per HPI.    PAST MEDICAL HISTORY  Past Medical History:   Diagnosis Date    Arthritis     knee and back    ASTHMA     rarely. uses inhaler exercise inducted    Back pain 6/21/2011    Headache(784.0) 7/25/2011    Migraines     Obesity     Pain 1/2012    bilat. feet 6/10    PCOS (polycystic ovarian syndrome)     Psychiatric disorder     depression and anxiety    Type II or unspecified type diabetes mellitus without mention of complication, not stated as uncontrolled     diet and oral meds       SURGICAL HISTORY  Past Surgical History:   Procedure Laterality Date    PB KNEE SCOPE,DIAGNOSTIC Right 3/24/2020    Procedure: ARTHROSCOPY, KNEE;  Surgeon: Jorge Paulino M.D.;  Location: SURGERY [facility];  Service: Orthopedics    TUBAL COAGULATION LAPAROSCOPIC BILATERAL  2/20/2015    Performed by Juan R Sutton M.D. at SURGERY SAME DAY [facility]    DILATION AND CURETTAGE  2/20/2015    Performed by Juan R GLYNN  "TOM Sutton at SURGERY SAME DAY [facility] ORS    HYSTEROSCOPY NOVASURE-2  2015    Performed by Juan R Sutton M.D. at SURGERY SAME DAY [facility] ORS    OTHER  2014    oral surgery in office    REPEAT C SECTION  2012    Performed by DESIREE TUCKER at LABOR AND DELIVERY    OTHER      bilateral knee surgery    [place]  DELIVERY ONLY         FAMILY HISTORY  Family History   Problem Relation Age of Onset    Non-contributory Mother     Hyperlipidemia Mother     Non-contributory Father     Alcohol/Drug Father     Heart Disease Maternal Grandfather     Arthritis Maternal Grandmother     Hyperlipidemia Maternal Grandmother        SOCIAL HISTORY   reports that she has been smoking cigarettes. She has a 18 pack-year smoking history. She has never used smokeless tobacco. She reports current drug use. Drugs: Intravenous and Inhaled. She reports that she does not drink alcohol.    CURRENT MEDICATIONS  None stated    ALLERGIES  Patient has no known allergies.    PHYSICAL EXAM  /84   Pulse (!) 104   Temp 35.9 °C (96.6 °F) (Temporal)   Resp 18   Ht 1.753 m (5' 9\")   Wt 116 kg (255 lb 4.7 oz)   SpO2 97%   BMI 37.70 kg/m²     General: No acute distress.  HENT: Normocephalic, Mucus membranes are moist.   Chest: Lungs have even and unlabored respirations, Clear to auscultation.   Cardiovascular: Regular rate and regular rhythm, No peripheral cyanosis.  Abdomen: Non distended.  Extremities: Left middle finger superior to fingernail has rupture. Sore on left great toe lateral to nail, Sore on digit 2 on left foot, Sore on digit 2 on right foot.   Neuro: Awake, Conversive, Able to relay recent events.  Psychiatric: Calm and cooperative.      INITIAL ASSESSMENT  Patient has swelling and erythema specific to lower extremity that is tender, It is concerning for cellulitis. I will evaluate for sepsis and initiate antibiotic treatment.     ED Observation Status? No; Patient does not meet criteria for ED " Observation.     DIAGNOSTIC STUDIES    Labs:   Results for orders placed or performed during the hospital encounter of 03/29/25   CBC WITH DIFFERENTIAL    Collection Time: 03/29/25  3:25 PM   Result Value Ref Range    WBC 15.4 (H) 4.8 - 10.8 K/uL    RBC 4.73 4.20 - 5.40 M/uL    Hemoglobin 15.1 12.0 - 16.0 g/dL    Hematocrit 43.2 37.0 - 47.0 %    MCV 91.3 81.4 - 97.8 fL    MCH 31.9 27.0 - 33.0 pg    MCHC 35.0 32.2 - 35.5 g/dL    RDW 39.9 35.9 - 50.0 fL    Platelet Count 261 164 - 446 K/uL    MPV 9.1 9.0 - 12.9 fL    Neutrophils-Polys 87.20 (H) 44.00 - 72.00 %    Lymphocytes 7.20 (L) 22.00 - 41.00 %    Monocytes 4.60 0.00 - 13.40 %    Eosinophils 0.30 0.00 - 6.90 %    Basophils 0.30 0.00 - 1.80 %    Immature Granulocytes 0.40 0.00 - 0.90 %    Nucleated RBC 0.00 0.00 - 0.20 /100 WBC    Neutrophils (Absolute) 13.43 (H) 1.82 - 7.42 K/uL    Lymphs (Absolute) 1.11 1.00 - 4.80 K/uL    Monos (Absolute) 0.71 0.00 - 0.85 K/uL    Eos (Absolute) 0.04 0.00 - 0.51 K/uL    Baso (Absolute) 0.04 0.00 - 0.12 K/uL    Immature Granulocytes (abs) 0.06 0.00 - 0.11 K/uL    NRBC (Absolute) 0.00 K/uL   COMP METABOLIC PANEL    Collection Time: 03/29/25  3:25 PM   Result Value Ref Range    Sodium 131 (L) 135 - 145 mmol/L    Potassium 3.8 3.6 - 5.5 mmol/L    Chloride 99 96 - 112 mmol/L    Co2 16 (L) 20 - 33 mmol/L    Anion Gap 16.0 7.0 - 16.0    Glucose 132 (H) 65 - 99 mg/dL    Bun 21 8 - 22 mg/dL    Creatinine 1.72 (H) 0.50 - 1.40 mg/dL    Calcium 9.6 8.5 - 10.5 mg/dL    Correct Calcium 9.8 8.5 - 10.5 mg/dL    AST(SGOT) 21 12 - 45 U/L    ALT(SGPT) 15 2 - 50 U/L    Alkaline Phosphatase 91 30 - 99 U/L    Total Bilirubin 1.4 0.1 - 1.5 mg/dL    Albumin 3.7 3.2 - 4.9 g/dL    Total Protein 8.3 (H) 6.0 - 8.2 g/dL    Globulin 4.6 (H) 1.9 - 3.5 g/dL    A-G Ratio 0.8 g/dL   LIPASE    Collection Time: 03/29/25  3:25 PM   Result Value Ref Range    Lipase 12 11 - 82 U/L   HCG QUAL SERUM    Collection Time: 03/29/25  3:25 PM   Result Value Ref Range     Beta-Hcg Qualitative Serum Negative Negative   ESTIMATED GFR    Collection Time: 03/29/25  3:25 PM   Result Value Ref Range    GFR (CKD-EPI) 39 (A) >60 mL/min/1.73 m 2   CORTISOL    Collection Time: 03/29/25  3:25 PM   Result Value Ref Range    Cortisol 27.4 (H) 0.0 - 23.0 ug/dL   PROCALCITONIN    Collection Time: 03/29/25  3:25 PM   Result Value Ref Range    Procalcitonin 1.89 (H) <0.25 ng/mL   CRP QUANTITIVE (NON-CARDIAC)    Collection Time: 03/29/25  3:25 PM   Result Value Ref Range    Stat C-Reactive Protein 26.20 (H) 0.00 - 0.75 mg/dL   LDH    Collection Time: 03/29/25  3:25 PM   Result Value Ref Range    LDH Total 544 (H) 107 - 266 U/L   MAGNESIUM    Collection Time: 03/29/25  3:25 PM   Result Value Ref Range    Magnesium 1.6 1.5 - 2.5 mg/dL   URINALYSIS,CULTURE IF INDICATED    Collection Time: 03/29/25  3:35 PM    Specimen: Urine, Clean Catch; Blood   Result Value Ref Range    Color Orange (A)     Character Turbid (A)     Specific Gravity 1.034 <1.035    Ph 5.0 5.0 - 8.0    Glucose Negative Negative mg/dL    Ketones Trace (A) Negative mg/dL    Protein 100 (A) Negative mg/dL    Bilirubin Moderate (A) Negative    Urobilinogen, Urine 2.0 (A) <=1.0 EU/dL    Nitrite Positive (A) Negative    Leukocyte Esterase Moderate (A) Negative    Occult Blood Negative Negative    Micro Urine Req Microscopic    URINE MICROSCOPIC (W/UA)    Collection Time: 03/29/25  3:35 PM   Result Value Ref Range    WBC  (A) /hpf    RBC 11-20 (A) 0 - 2 /hpf    Bacteria Many (A) None /hpf    Epithelial Cells 11-20 (A) 0 - 5 /hpf    Ca Oxalate Crystal Present (A) Absent /hpf    Urine Casts >20 (A) 0 - 2 /lpf    Hyaline Cast Present /lpf    Trichomonas Present (A) Absent /hpf   Lactic Acid    Collection Time: 03/29/25  4:20 PM   Result Value Ref Range    Lactic Acid 2.8 (H) 0.5 - 2.0 mmol/L   Prothrombin time (INR)    Collection Time: 03/29/25  5:57 PM   Result Value Ref Range    PT 16.9 (H) 12.0 - 14.6 sec    INR 1.37 (H) 0.87 - 1.13    Lactic Acid    Collection Time: 03/29/25  6:10 PM   Result Value Ref Range    Lactic Acid 1.2 0.5 - 2.0 mmol/L      EKG:   I have independently interpreted the above EKG.    Radiology:   The attending emergency physician has independently interpreted the diagnostic imaging associated with this visit and am waiting the final reading from the radiologist.   Preliminary interpretation is as follows: No pneumonia  Radiologist interpretation:   DX-CHEST-PORTABLE (1 VIEW)   Final Result      No acute cardiac or pulmonary abnormalities are identified.              COURSE & MEDICAL DECISION MAKING     COURSE AND PLAN  3:59 PM - Patient seen and examined at bedside. Discussed plan of care, including labs and imaging. Patient agrees to the plan of care. The patient will be medicated with Ancef injection 2g. Ordered for UA, HCG Qual Serum, Lipase, CMP, CBC w/ Diff, UC, Lactic Acid, and DX-Chest to evaluate her symptoms.     5:30 PM I discussed the patient's case and the above findings with Dr. Diaz (Hospitalist) who agrees to evaluate the patient for hospitalization.      5:29 PM - I reevaluated the patient at bedside. I discussed the patient's diagnostic study results. I informed the patient of my plan to admit today given the patient's current presentation and diagnostic study results. Patient verbalizes understanding and support with my plan for admission.      ED Summary: This patient presents with new onset swelling and erythema of the left lower extremity with tenderness.  There are sores of the foot which may be the source of the infection.  She is homeless and does not care for herself.    Her white blood cell count is elevated, sepsis evaluation was completed and shows no concerns for sepsis she does have a urinary tract infection.  IV antibiotics were initiated on my evaluation.  I spoke with the hospitalist for admission should be admitted for further evaluation and treatment.      DISPOSITION AND DISCUSSIONS  I  have discussed management of the patient with the following physicians and GT's: Dr. Diaz (Hospitalist)    DISPOSITION:  Patient will be hospitalized by Dr. Diaz in guarded condition.     FINAL DIAGNOSIS  1. Urinary tract infection with hematuria, site unspecified    2. Leukocytosis, unspecified type    3. Cellulitis of left lower extremity      Sita HARRIS (Scribe), am scribing for, and in the presence of, Mani Jenkins D.O..    Electronically signed by: Sita Donaldson (Scribe), 3/29/2025    IMani D.O. personally performed the services described in this documentation, as scribed by Sita Donaldson in my presence, and it is both accurate and complete.     The note accurately reflects work and decisions made by me.  Mani Jenkins D.O.  3/29/2025  6:55 PM

## 2025-03-30 ENCOUNTER — APPOINTMENT (OUTPATIENT)
Dept: RADIOLOGY | Facility: MEDICAL CENTER | Age: 40
End: 2025-03-30
Attending: STUDENT IN AN ORGANIZED HEALTH CARE EDUCATION/TRAINING PROGRAM
Payer: MEDICAID

## 2025-03-30 LAB
ANION GAP SERPL CALC-SCNC: 12 MMOL/L (ref 7–16)
BUN SERPL-MCNC: 12 MG/DL (ref 8–22)
CALCIUM SERPL-MCNC: 8.5 MG/DL (ref 8.5–10.5)
CHLORIDE SERPL-SCNC: 102 MMOL/L (ref 96–112)
CO2 SERPL-SCNC: 20 MMOL/L (ref 20–33)
CREAT SERPL-MCNC: 0.75 MG/DL (ref 0.5–1.4)
ERYTHROCYTE [DISTWIDTH] IN BLOOD BY AUTOMATED COUNT: 40.1 FL (ref 35.9–50)
GFR SERPLBLD CREATININE-BSD FMLA CKD-EPI: 105 ML/MIN/1.73 M 2
GLUCOSE BLD STRIP.AUTO-MCNC: 142 MG/DL (ref 65–99)
GLUCOSE SERPL-MCNC: 137 MG/DL (ref 65–99)
HCT VFR BLD AUTO: 37.4 % (ref 37–47)
HGB BLD-MCNC: 12.9 G/DL (ref 12–16)
MAGNESIUM SERPL-MCNC: 2.1 MG/DL (ref 1.5–2.5)
MCH RBC QN AUTO: 31.5 PG (ref 27–33)
MCHC RBC AUTO-ENTMCNC: 34.5 G/DL (ref 32.2–35.5)
MCV RBC AUTO: 91.4 FL (ref 81.4–97.8)
PHOSPHATE SERPL-MCNC: 3.4 MG/DL (ref 2.5–4.5)
PLATELET # BLD AUTO: 213 K/UL (ref 164–446)
PMV BLD AUTO: 8.9 FL (ref 9–12.9)
POTASSIUM SERPL-SCNC: 3.5 MMOL/L (ref 3.6–5.5)
RBC # BLD AUTO: 4.09 M/UL (ref 4.2–5.4)
SODIUM SERPL-SCNC: 134 MMOL/L (ref 135–145)
WBC # BLD AUTO: 9.2 K/UL (ref 4.8–10.8)

## 2025-03-30 PROCEDURE — 84100 ASSAY OF PHOSPHORUS: CPT

## 2025-03-30 PROCEDURE — 93970 EXTREMITY STUDY: CPT

## 2025-03-30 PROCEDURE — 83735 ASSAY OF MAGNESIUM: CPT

## 2025-03-30 PROCEDURE — A9270 NON-COVERED ITEM OR SERVICE: HCPCS | Performed by: STUDENT IN AN ORGANIZED HEALTH CARE EDUCATION/TRAINING PROGRAM

## 2025-03-30 PROCEDURE — 700105 HCHG RX REV CODE 258: Performed by: STUDENT IN AN ORGANIZED HEALTH CARE EDUCATION/TRAINING PROGRAM

## 2025-03-30 PROCEDURE — 82962 GLUCOSE BLOOD TEST: CPT

## 2025-03-30 PROCEDURE — 85027 COMPLETE CBC AUTOMATED: CPT

## 2025-03-30 PROCEDURE — 700102 HCHG RX REV CODE 250 W/ 637 OVERRIDE(OP): Performed by: STUDENT IN AN ORGANIZED HEALTH CARE EDUCATION/TRAINING PROGRAM

## 2025-03-30 PROCEDURE — 770006 HCHG ROOM/CARE - MED/SURG/GYN SEMI*

## 2025-03-30 PROCEDURE — 80048 BASIC METABOLIC PNL TOTAL CA: CPT

## 2025-03-30 PROCEDURE — 99233 SBSQ HOSP IP/OBS HIGH 50: CPT | Performed by: STUDENT IN AN ORGANIZED HEALTH CARE EDUCATION/TRAINING PROGRAM

## 2025-03-30 PROCEDURE — 700111 HCHG RX REV CODE 636 W/ 250 OVERRIDE (IP): Performed by: STUDENT IN AN ORGANIZED HEALTH CARE EDUCATION/TRAINING PROGRAM

## 2025-03-30 RX ORDER — HYDROCODONE BITARTRATE AND ACETAMINOPHEN 5; 325 MG/1; MG/1
1 TABLET ORAL EVERY 6 HOURS PRN
Status: DISCONTINUED | OUTPATIENT
Start: 2025-03-30 | End: 2025-03-31 | Stop reason: HOSPADM

## 2025-03-30 RX ADMIN — NYSTATIN: 100000 POWDER TOPICAL at 11:30

## 2025-03-30 RX ADMIN — ASPIRIN 81 MG: 81 TABLET, COATED ORAL at 05:28

## 2025-03-30 RX ADMIN — AMPICILLIN AND SULBACTAM 3 G: 1; 2 INJECTION, POWDER, FOR SOLUTION INTRAMUSCULAR; INTRAVENOUS at 16:18

## 2025-03-30 RX ADMIN — ATORVASTATIN CALCIUM 40 MG: 40 TABLET, FILM COATED ORAL at 16:18

## 2025-03-30 RX ADMIN — OXYCODONE HYDROCHLORIDE 10 MG: 10 TABLET ORAL at 19:50

## 2025-03-30 RX ADMIN — AMPICILLIN AND SULBACTAM 3 G: 1; 2 INJECTION, POWDER, FOR SOLUTION INTRAMUSCULAR; INTRAVENOUS at 02:40

## 2025-03-30 RX ADMIN — NYSTATIN: 100000 POWDER TOPICAL at 05:29

## 2025-03-30 RX ADMIN — ACETAMINOPHEN 650 MG: 325 TABLET ORAL at 11:30

## 2025-03-30 RX ADMIN — HEPARIN SODIUM 5000 UNITS: 5000 INJECTION, SOLUTION INTRAVENOUS; SUBCUTANEOUS at 05:29

## 2025-03-30 RX ADMIN — LINEZOLID 600 MG: 600 INJECTION, SOLUTION INTRAVENOUS at 02:56

## 2025-03-30 RX ADMIN — SENNOSIDES AND DOCUSATE SODIUM 2 TABLET: 50; 8.6 TABLET ORAL at 16:18

## 2025-03-30 RX ADMIN — NYSTATIN: 100000 POWDER TOPICAL at 16:30

## 2025-03-30 RX ADMIN — ACETAMINOPHEN 650 MG: 325 TABLET ORAL at 05:28

## 2025-03-30 RX ADMIN — AMPICILLIN AND SULBACTAM 3 G: 1; 2 INJECTION, POWDER, FOR SOLUTION INTRAMUSCULAR; INTRAVENOUS at 20:15

## 2025-03-30 RX ADMIN — ACETAMINOPHEN 650 MG: 325 TABLET ORAL at 16:18

## 2025-03-30 RX ADMIN — ACETAMINOPHEN 650 MG: 325 TABLET ORAL at 23:57

## 2025-03-30 RX ADMIN — OXYCODONE HYDROCHLORIDE 10 MG: 10 TABLET ORAL at 02:42

## 2025-03-30 RX ADMIN — AMPICILLIN AND SULBACTAM 3 G: 1; 2 INJECTION, POWDER, FOR SOLUTION INTRAMUSCULAR; INTRAVENOUS at 09:51

## 2025-03-30 SDOH — ECONOMIC STABILITY: TRANSPORTATION INSECURITY
IN THE PAST 12 MONTHS, HAS LACK OF RELIABLE TRANSPORTATION KEPT YOU FROM MEDICAL APPOINTMENTS, MEETINGS, WORK OR FROM GETTING THINGS NEEDED FOR DAILY LIVING?: YES

## 2025-03-30 ASSESSMENT — PATIENT HEALTH QUESTIONNAIRE - PHQ9
3. TROUBLE FALLING OR STAYING ASLEEP OR SLEEPING TOO MUCH: SEVERAL DAYS
SUM OF ALL RESPONSES TO PHQ QUESTIONS 1-9: 5
8. MOVING OR SPEAKING SO SLOWLY THAT OTHER PEOPLE COULD HAVE NOTICED. OR THE OPPOSITE, BEING SO FIGETY OR RESTLESS THAT YOU HAVE BEEN MOVING AROUND A LOT MORE THAN USUAL: NOT AT ALL
1. LITTLE INTEREST OR PLEASURE IN DOING THINGS: SEVERAL DAYS
4. FEELING TIRED OR HAVING LITTLE ENERGY: SEVERAL DAYS
9. THOUGHTS THAT YOU WOULD BE BETTER OFF DEAD, OR OF HURTING YOURSELF: NOT AT ALL
2. FEELING DOWN, DEPRESSED, IRRITABLE, OR HOPELESS: SEVERAL DAYS
6. FEELING BAD ABOUT YOURSELF - OR THAT YOU ARE A FAILURE OR HAVE LET YOURSELF OR YOUR FAMILY DOWN: SEVERAL DAYS
7. TROUBLE CONCENTRATING ON THINGS, SUCH AS READING THE NEWSPAPER OR WATCHING TELEVISION: NOT AT ALL
SUM OF ALL RESPONSES TO PHQ9 QUESTIONS 1 AND 2: 2
5. POOR APPETITE OR OVEREATING: NOT AT ALL

## 2025-03-30 ASSESSMENT — SOCIAL DETERMINANTS OF HEALTH (SDOH)
WITHIN THE LAST YEAR, HAVE YOU BEEN HUMILIATED OR EMOTIONALLY ABUSED IN OTHER WAYS BY YOUR PARTNER OR EX-PARTNER?: NO
WITHIN THE PAST 12 MONTHS, THE FOOD YOU BOUGHT JUST DIDN'T LAST AND YOU DIDN'T HAVE MONEY TO GET MORE: OFTEN TRUE
WITHIN THE PAST 12 MONTHS, YOU WORRIED THAT YOUR FOOD WOULD RUN OUT BEFORE YOU GOT THE MONEY TO BUY MORE: OFTEN TRUE
WITHIN THE LAST YEAR, HAVE YOU BEEN KICKED, HIT, SLAPPED, OR OTHERWISE PHYSICALLY HURT BY YOUR PARTNER OR EX-PARTNER?: NO
IN THE PAST 12 MONTHS, HAS THE ELECTRIC, GAS, OIL, OR WATER COMPANY THREATENED TO SHUT OFF SERVICE IN YOUR HOME?: ALREADY SHUT OFF
WITHIN THE LAST YEAR, HAVE YOU BEEN AFRAID OF YOUR PARTNER OR EX-PARTNER?: NO
WITHIN THE LAST YEAR, HAVE TO BEEN RAPED OR FORCED TO HAVE ANY KIND OF SEXUAL ACTIVITY BY YOUR PARTNER OR EX-PARTNER?: NO

## 2025-03-30 ASSESSMENT — COGNITIVE AND FUNCTIONAL STATUS - GENERAL
MOBILITY SCORE: 24
SUGGESTED CMS G CODE MODIFIER MOBILITY: CH
SUGGESTED CMS G CODE MODIFIER DAILY ACTIVITY: CH
DAILY ACTIVITIY SCORE: 24

## 2025-03-30 ASSESSMENT — LIFESTYLE VARIABLES
TOTAL SCORE: 0
CONSUMPTION TOTAL: NEGATIVE
EVER FELT BAD OR GUILTY ABOUT YOUR DRINKING: NO
DOES PATIENT WANT TO STOP DRINKING: NO
ALCOHOL_USE: NO
ON A TYPICAL DAY WHEN YOU DRINK ALCOHOL HOW MANY DRINKS DO YOU HAVE: 0
TOTAL SCORE: 0
AVERAGE NUMBER OF DAYS PER WEEK YOU HAVE A DRINK CONTAINING ALCOHOL: 0
HOW MANY TIMES IN THE PAST YEAR HAVE YOU HAD 5 OR MORE DRINKS IN A DAY: 0
TOTAL SCORE: 0
HAVE YOU EVER FELT YOU SHOULD CUT DOWN ON YOUR DRINKING: NO
EVER HAD A DRINK FIRST THING IN THE MORNING TO STEADY YOUR NERVES TO GET RID OF A HANGOVER: NO
HAVE PEOPLE ANNOYED YOU BY CRITICIZING YOUR DRINKING: NO

## 2025-03-30 ASSESSMENT — PAIN DESCRIPTION - PAIN TYPE
TYPE: ACUTE PAIN

## 2025-03-30 ASSESSMENT — FIBROSIS 4 INDEX
FIB4 SCORE: 0.77
FIB4 SCORE: 0.77

## 2025-03-30 NOTE — ASSESSMENT & PLAN NOTE
This is Sepsis Present on admission  SIRS criteria identified on my evaluation include: Tachycardia, with heart rate greater than 90 BPM, Tachypnea, with respirations greater than 20 per minute, Leukocytosis, with WBC greater than 12,000, and Bandemia, greater than 10% bands  Clinical indicators of end organ dysfunction include Lactic Acid greater than 2 and Acute On Chronic Renal Failure, with creatinine >0.5 above baseline level  Source is LLE cellulitis  Sepsis protocol initiated  Crystalloid Fluid Administration: Fluid resuscitation ordered per standard protocol - 30 mL/kg per current or ideal body weight  IV antibiotics as appropriate for source of sepsis  Reassessment: I have reassessed the patient's hemodynamic status

## 2025-03-30 NOTE — PROGRESS NOTES
Assumed pt care with RN. Pt is A&OX4 and states she is in 4/10 pain and declines interventions at this time. Plan of care discussed, no further questions. Personal belongings and call light within reach, bed alarm on.

## 2025-03-30 NOTE — H&P
Hospital Medicine History & Physical Note    Date of Service  3/29/2025    Primary Care Physician  Pcp Pt States None    Consultants  None    Code Status  Full Code    Chief Complaint  Chief Complaint   Patient presents with    Wound Check     Bilateral foot swelling, left hand, and left leg.     Blood in Urine     Patient reports brown dark urine.     Sent from Urgent Care     Patient sent for concern of Cellulitis of left lower extremity and Acute cystitis without hematuria per notes.       Abdominal Pain       History of Presenting Illness  Kat Devlin is a 39 y.o. morbidly obese homeless female with history of tobacco abuse, substance abuse, diabetes who presented 3/29/2025 with evaluation for swelling on left leg.  Patient reported ongoing redness, increased swelling of left leg and pain over the past several days.  She does admit to smoking fentanyl, denies IV DA.  She was seen at urgent care, referred to ER for further evaluation.  In ER, due to concern of cellulitis, as well as UTI, admission requested by ERP.  Therefore, admitted to medicine service for further evaluation and treatment.    I discussed the plan of care with patient, bedside RN, charge RN, and pharmacy.    Review of Systems  Review of Systems   Constitutional:  Positive for chills, fever and malaise/fatigue.   HENT:  Negative for hearing loss and tinnitus.    Eyes:  Negative for blurred vision and double vision.   Respiratory:  Negative for cough and shortness of breath.    Cardiovascular:  Positive for palpitations. Negative for chest pain.   Gastrointestinal:  Negative for abdominal pain, heartburn, nausea and vomiting.   Genitourinary:  Positive for dysuria, frequency and urgency.   Musculoskeletal:  Positive for joint pain and myalgias.   Skin:  Negative for itching and rash.   Neurological:  Positive for weakness. Negative for dizziness, focal weakness and headaches.   Endo/Heme/Allergies:  Negative for environmental allergies. Does  not bruise/bleed easily.   Psychiatric/Behavioral:  Positive for substance abuse. Negative for depression. The patient is nervous/anxious.    All other systems reviewed and are negative.      Past Medical History   has a past medical history of Arthritis, ASTHMA, Back pain (2011), Headache(784.0) (2011), Migraines, Obesity, Pain (2012), PCOS (polycystic ovarian syndrome), Psychiatric disorder, and Type II or unspecified type diabetes mellitus without mention of complication, not stated as uncontrolled.    Surgical History   has a past surgical history that includes pr  delivery only; repeat c section (2012); other; other (2014); tubal coagulation laparoscopic bilateral (2015); dilation and curettage (2015); hysteroscopy novasure-2 (2015); and pr knee scope,diagnostic (Right, 3/24/2020).     Family History  family history includes Alcohol/Drug in her father; Arthritis in her maternal grandmother; Heart Disease in her maternal grandfather; Hyperlipidemia in her maternal grandmother and mother; Non-contributory in her father and mother.   Family history reviewed with patient. There is family history that is pertinent to the chief complaint.     Social History   reports that she has been smoking cigarettes. She has a 18 pack-year smoking history. She has never used smokeless tobacco. She reports current drug use. Drugs: Intravenous and Inhaled. She reports that she does not drink alcohol.    Allergies  No Known Allergies    Medications  None       Physical Exam  Temp:  [35.9 °C (96.6 °F)-36.8 °C (98.3 °F)] 35.9 °C (96.6 °F)  Pulse:  [] 79  Resp:  [16-21] 21  BP: ()/(60-84) 99/84  SpO2:  [95 %-98 %] 98 %  Blood Pressure: 106/65   Temperature: 35.9 °C (96.6 °F)   Pulse: 87   Respiration: (!) 21   Pulse Oximetry: 98 %       Physical Exam  Vitals and nursing note reviewed.   Constitutional:       General: She is not in acute distress.     Appearance: She is obese. She  "is ill-appearing.      Comments: Poor personal hygiene  Malodorous   HENT:      Head: Normocephalic and atraumatic.      Nose: Nose normal.      Mouth/Throat:      Mouth: Mucous membranes are dry.      Pharynx: Oropharynx is clear.   Eyes:      General: No scleral icterus.     Extraocular Movements: Extraocular movements intact.   Cardiovascular:      Rate and Rhythm: Regular rhythm. Tachycardia present.      Pulses: Normal pulses.      Heart sounds:      No friction rub.   Pulmonary:      Effort: No respiratory distress.      Breath sounds: No stridor. Rales present. No wheezing.   Chest:      Chest wall: No tenderness.   Abdominal:      General: There is distension.      Tenderness: There is no abdominal tenderness. There is no guarding or rebound.   Musculoskeletal:         General: Swelling and tenderness present. Normal range of motion.      Cervical back: Neck supple. No tenderness.      Right lower leg: Edema present.      Left lower leg: Edema present.      Comments: LLE--swollen, erythema, tender  -Malodorous   Skin:     General: Skin is warm and dry.   Neurological:      General: No focal deficit present.      Mental Status: She is alert and oriented to person, place, and time.   Psychiatric:         Mood and Affect: Mood normal.         Laboratory:  Recent Labs     03/29/25  1525   WBC 15.4*   RBC 4.73   HEMOGLOBIN 15.1   HEMATOCRIT 43.2   MCV 91.3   MCH 31.9   MCHC 35.0   RDW 39.9   PLATELETCT 261   MPV 9.1     Recent Labs     03/29/25  1525   SODIUM 131*   POTASSIUM 3.8   CHLORIDE 99   CO2 16*   GLUCOSE 132*   BUN 21   CREATININE 1.72*   CALCIUM 9.6     Recent Labs     03/29/25  1525   ALTSGPT 15   ASTSGOT 21   ALKPHOSPHAT 91   TBILIRUBIN 1.4   LIPASE 12   GLUCOSE 132*         No results for input(s): \"NTPROBNP\" in the last 72 hours.      No results for input(s): \"TROPONINT\" in the last 72 hours.  Will require at least 2 midnights hospitalization, therefore appropriate for inpatient " status.  Imaging:  DX-CHEST-PORTABLE (1 VIEW)   Final Result      No acute cardiac or pulmonary abnormalities are identified.      US-EXTREMITY VENOUS LOWER BILAT    (Results Pending)       X-Ray:  I have personally reviewed the images and compared with prior images.  EKG:  I have personally reviewed the images and compared with prior images.    Assessment/Plan:  Justification for Admission Status  I anticipate this patient will require at least 2 midnights hospitalization, therefore appropriate for inpatient status.      * Cellulitis of left lower extremity- (present on admission)  Assessment & Plan  Of left lower extremity  Leukocytosis, elevated inflammatory markers, substance abuse  IVF  Wound care  Supportive pain control  Antibiotic: Unasyn, Zyvox  -Given Ancef 2G in ER  Check MRSA nares  Follow cultures    Sepsis due to cellulitis (HCC)- (present on admission)  Assessment & Plan  This is Sepsis Present on admission  SIRS criteria identified on my evaluation include: Tachycardia, with heart rate greater than 90 BPM, Tachypnea, with respirations greater than 20 per minute, Leukocytosis, with WBC greater than 12,000, and Bandemia, greater than 10% bands  Clinical indicators of end organ dysfunction include Lactic Acid greater than 2 and Acute On Chronic Renal Failure, with creatinine >0.5 above baseline level  Source is LLE cellulitis  Sepsis protocol initiated  Crystalloid Fluid Administration: Fluid resuscitation ordered per standard protocol - 30 mL/kg per current or ideal body weight  IV antibiotics as appropriate for source of sepsis  Reassessment: I have reassessed the patient's hemodynamic status    FRANCK (acute kidney injury) ([facility])  Assessment & Plan  IVF  Minimize nephrotoxic agents, renally dose meds  Check FeNa    Acute UTI- (present on admission)  Assessment & Plan  UA pyuria  Follow cultures  Already on antibiotic as above    Type 2 diabetes mellitus with hyperglycemia ([facility])- (present on  admission)  Assessment & Plan  ISS while inpatient  Statin    Bilateral leg edema  Assessment & Plan  Likely secondary to cellulitis  Check LE Doppler    Substance abuse (HCC)  Assessment & Plan  Admits to smoking fentanyl  Denies IVDA    Tobacco abuse counseling  Assessment & Plan  Admits to smoking at least 1 pack of cigarettes daily  Tobacco smoking cessation counseling provided: 11 minutes  Educated patient regarding tobacco smoking detrimental effect on cardiovascular health, pulmonary health, as well as impediment and slowing of wound healing which will affect her cellulitis recovery process.  She expressed understanding, however does admit to difficulty with cessation.  Patient was offered with nicotine patch, nicotine gum, Wellbutrin, Chantix as alternative to help her cope with nicotine craving and avoidance.  However, she declined all.  Patient was provided with standard tobacco cessation information per protocol        VTE prophylaxis: heparin ppx

## 2025-03-30 NOTE — ED NOTES
IV access placed using US guidance. Second set of blood cultures drawn and sent to lab. Medicated patient per MAR. Patient denies further needs. Call light within reach.

## 2025-03-30 NOTE — ED NOTES
Med rec updated and complete.  Allergies reviewed.   Pt confirmed name and date of birth.      Pt denies taking medications.  No OTC, No supplements, no prescription medications.      Pt will need to  use   [facility] [phone]

## 2025-03-30 NOTE — PROGRESS NOTES
Pt's L AC IV was very painful for pt when IV abx were started earlier in the shift 1920. A new IV was attempted by charge RN Aubree but was unable to set IV. [facility] was contacted to set IV. By 2200 RR had not come up to try and set IV. Therefore, Aubree RN charge nurse is reattempting with vein finder as the pt's abx needs to be administered. Aubree was unable to set IV and contacted RR again to ensure they would be coming to set IV.    2338 - awaiting RR to set IV    0147 - Aubree charge nurse contacting RR again to check status when IV will be placed    0207 - RR arrived to set IV

## 2025-03-30 NOTE — ED NOTES
Pt transported to [place] [id number] via gurney by transport team. All belongings with pt. Fluids continued to the floor.

## 2025-03-30 NOTE — PROGRESS NOTES
4 Eyes Skin Assessment Completed by Funmilayo RN and ALBINO Gill.    Head WDL  Ears WDL  Nose WDL  Mouth WDL  Neck WDL  Breast/Chest WDL  Shoulder Blades WDL  Spine WDL  (R) Arm/Elbow/Hand Redness and Blanching  (L) Arm/Elbow/Hand Redness and Blanching  Abdomen WDL  Groin Redness and Rash  Scrotum/Coccyx/Buttocks WDL  (R) Leg Redness, Bruising, and Swelling, edema, discoloration  (L) Leg Redness, Bruising, Swelling, Shiny, and Edema, discoloration  (R) Heel/Foot/Toe Redness and Blanching  (L) Heel/Foot/Toe Redness and Blanching          Devices In Places Pulse Ox      Interventions In Place Pillows    Possible Skin Injury No    Pictures Uploaded Into Epic Yes  Wound Consult Placed N/A  RN Wound Prevention Protocol Ordered No

## 2025-03-30 NOTE — ASSESSMENT & PLAN NOTE
Of left lower extremity  Leukocytosis, elevated inflammatory markers, substance abuse  IVF  Wound care  Supportive pain control  Antibiotic: Unasyn, Zyvox  -Given Ancef 2G in ER  Check MRSA nares  Follow cultures

## 2025-03-30 NOTE — WOUND TEAM
Wound team consulted for patient's cellulitic L. Leg.  Clinical photo reviewed and no open wounds at this point.  Consult completed recommend imaging and antibiotics.

## 2025-03-30 NOTE — ASSESSMENT & PLAN NOTE
Admits to smoking at least 1 pack of cigarettes daily  Tobacco smoking cessation counseling provided: 11 minutes  Educated patient regarding tobacco smoking detrimental effect on cardiovascular health, pulmonary health, as well as impediment and slowing of wound healing which will affect her cellulitis recovery process.  She expressed understanding, however does admit to difficulty with cessation.  Patient was offered with nicotine patch, nicotine gum, Wellbutrin, Chantix as alternative to help her cope with nicotine craving and avoidance.  However, she declined all.  Patient was provided with standard tobacco cessation information per protocol

## 2025-03-30 NOTE — CARE PLAN
The patient is Stable - Low risk of patient condition declining or worsening    Shift Goals  Clinical Goals: Pt to have pain control of 5 or better during the night shift  Patient Goals: Rest and pain control  Family Goals: ARVIND    Progress made toward(s) clinical / shift goals:  Pt was able to reach pain control of less that 5/10 during the night shift    Patient is not progressing towards the following goals:

## 2025-03-31 VITALS
BODY MASS INDEX: 37.81 KG/M2 | RESPIRATION RATE: 17 BRPM | DIASTOLIC BLOOD PRESSURE: 58 MMHG | SYSTOLIC BLOOD PRESSURE: 107 MMHG | HEART RATE: 74 BPM | HEIGHT: 69 IN | WEIGHT: 255.29 LBS | TEMPERATURE: 97.5 F | OXYGEN SATURATION: 95 %

## 2025-03-31 LAB
BACTERIA UR CULT: ABNORMAL
BACTERIA UR CULT: ABNORMAL
SIGNIFICANT IND 70042: ABNORMAL
SITE SITE: ABNORMAL
SOURCE SOURCE: ABNORMAL

## 2025-03-31 PROCEDURE — A9270 NON-COVERED ITEM OR SERVICE: HCPCS | Performed by: STUDENT IN AN ORGANIZED HEALTH CARE EDUCATION/TRAINING PROGRAM

## 2025-03-31 PROCEDURE — RXMED WILLOW AMBULATORY MEDICATION CHARGE: Performed by: STUDENT IN AN ORGANIZED HEALTH CARE EDUCATION/TRAINING PROGRAM

## 2025-03-31 PROCEDURE — 700111 HCHG RX REV CODE 636 W/ 250 OVERRIDE (IP): Mod: JZ | Performed by: STUDENT IN AN ORGANIZED HEALTH CARE EDUCATION/TRAINING PROGRAM

## 2025-03-31 PROCEDURE — 700105 HCHG RX REV CODE 258: Performed by: STUDENT IN AN ORGANIZED HEALTH CARE EDUCATION/TRAINING PROGRAM

## 2025-03-31 PROCEDURE — 700102 HCHG RX REV CODE 250 W/ 637 OVERRIDE(OP): Performed by: STUDENT IN AN ORGANIZED HEALTH CARE EDUCATION/TRAINING PROGRAM

## 2025-03-31 PROCEDURE — 99239 HOSP IP/OBS DSCHRG MGMT >30: CPT | Performed by: STUDENT IN AN ORGANIZED HEALTH CARE EDUCATION/TRAINING PROGRAM

## 2025-03-31 RX ORDER — CEPHALEXIN 500 MG/1
1000 CAPSULE ORAL EVERY 8 HOURS
Status: DISCONTINUED | OUTPATIENT
Start: 2025-03-31 | End: 2025-03-31 | Stop reason: HOSPADM

## 2025-03-31 RX ORDER — POTASSIUM CHLORIDE 1500 MG/1
40 TABLET, EXTENDED RELEASE ORAL ONCE
Status: COMPLETED | OUTPATIENT
Start: 2025-03-31 | End: 2025-03-31

## 2025-03-31 RX ORDER — HYDROCODONE BITARTRATE AND ACETAMINOPHEN 5; 325 MG/1; MG/1
1 TABLET ORAL EVERY 6 HOURS PRN
Qty: 16 TABLET | Refills: 0 | Status: SHIPPED | OUTPATIENT
Start: 2025-03-31 | End: 2025-04-07

## 2025-03-31 RX ORDER — NYSTATIN 100000 [USP'U]/G
1 POWDER TOPICAL 3 TIMES DAILY
Qty: 15 G | Refills: 0 | Status: SHIPPED | OUTPATIENT
Start: 2025-03-31

## 2025-03-31 RX ORDER — CEPHALEXIN 500 MG/1
1000 CAPSULE ORAL EVERY 8 HOURS
Qty: 36 CAPSULE | Refills: 0 | Status: ACTIVE | OUTPATIENT
Start: 2025-03-31 | End: 2025-04-09

## 2025-03-31 RX ADMIN — PENICILLIN G BENZATHINE 2.4 MILLION UNITS: 2400000 INJECTION, SUSPENSION INTRAMUSCULAR at 14:01

## 2025-03-31 RX ADMIN — POTASSIUM CHLORIDE 40 MEQ: 1500 TABLET, EXTENDED RELEASE ORAL at 08:59

## 2025-03-31 RX ADMIN — ACETAMINOPHEN 650 MG: 325 TABLET ORAL at 04:57

## 2025-03-31 RX ADMIN — AMPICILLIN AND SULBACTAM 3 G: 1; 2 INJECTION, POWDER, FOR SOLUTION INTRAMUSCULAR; INTRAVENOUS at 02:59

## 2025-03-31 RX ADMIN — NYSTATIN: 100000 POWDER TOPICAL at 04:57

## 2025-03-31 RX ADMIN — CEPHALEXIN 1000 MG: 500 CAPSULE ORAL at 14:04

## 2025-03-31 RX ADMIN — ACETAMINOPHEN 650 MG: 325 TABLET ORAL at 14:03

## 2025-03-31 RX ADMIN — AMPICILLIN AND SULBACTAM 3 G: 1; 2 INJECTION, POWDER, FOR SOLUTION INTRAMUSCULAR; INTRAVENOUS at 09:27

## 2025-03-31 ASSESSMENT — PAIN DESCRIPTION - PAIN TYPE
TYPE: ACUTE PAIN

## 2025-03-31 NOTE — PROGRESS NOTES
[facility] Medicine Daily Progress Note    Date of Service  3/30/2025    Chief Complaint  Kat Devlin is a 39 y.o. female admitted 3/29/2025 with cellulitis.    Hospital Course  Kat Devlin is a 39 y.o. morbidly obese homeless female with history of tobacco abuse, substance abuse, diabetes who presented 3/29/2025 with evaluation for swelling on left leg.  Patient reported ongoing redness, increased swelling of left leg and pain over the past several days.  She does admit to smoking fentanyl, denies IV DA.  She was seen at urgent care, referred to ER for further evaluation.  In ER, due to concern of cellulitis, as well as UTI, admission requested by ERP.  Therefore, admitted to medicine service for further evaluation and treatment.     Interval Problem Update  3/30  Afebrile normal pulse and respiratory rate systolic blood pressure 80s to low 100s pulse ox 90 to 97% on room air.  CBC with normal cell counts, sodium 134 potassium 3.5 glucose 137.  Analgesia adjusted, MRSA nares negative, discontinue linezolid.  Pain levels fluctuating today, response to PRNs.  Cellulitis improving, urinary symptoms improved.   Likely another 1-2 days prior to discharge.     I have discussed this patient's plan of care and discharge plan at IDT rounds today with Case Management, Nursing, Nursing leadership, and other members of the IDT team.    Consultants/Specialty  None    Code Status  Full Code    Disposition  The patient is not medically cleared for discharge to home or a post-acute facility.      I have placed the appropriate orders for post-discharge needs.    Review of Systems  ROS   Review of Systems   Constitutional:  Positive for chills, fever and malaise/fatigue.   HENT:  Negative for hearing loss and tinnitus.    Eyes:  Negative for blurred vision and double vision.   Respiratory:  Negative for cough and shortness of breath.    Cardiovascular:  Positive for palpitations. Negative for chest pain.   Gastrointestinal:   Negative for abdominal pain, heartburn, nausea and vomiting.   Genitourinary:  Positive for dysuria, frequency and urgency.   Musculoskeletal:  Positive for joint pain and myalgias.   Skin:  Negative for itching and rash.   Neurological:  Positive for weakness. Negative for dizziness, focal weakness and headaches.   Endo/Heme/Allergies:  Negative for environmental allergies. Does not bruise/bleed easily.   Psychiatric/Behavioral:  Positive for substance abuse. Negative for depression. The patient is nervous/anxious.    All other systems reviewed and are negative.    Physical Exam  Temp:  [36.6 °C (97.8 °F)-37.2 °C (98.9 °F)] 36.8 °C (98.2 °F)  Pulse:  [73-90] 90  Resp:  [17-18] 18  BP: ()/(49-67) 107/67  SpO2:  [92 %-97 %] 97 %    Physical Exam  Vitals and nursing note reviewed.   Constitutional:       General: She is in acute distress (2/2 headache and leg pain).      Appearance: She is ill-appearing. She is not toxic-appearing.   HENT:      Head: Normocephalic and atraumatic.      Nose: Nose normal. No rhinorrhea.      Mouth/Throat:      Mouth: Mucous membranes are dry.      Pharynx: Oropharynx is clear.   Eyes:      General: No scleral icterus.     Extraocular Movements: Extraocular movements intact.      Conjunctiva/sclera: Conjunctivae normal.   Cardiovascular:      Rate and Rhythm: Normal rate and regular rhythm.      Pulses: Normal pulses.   Pulmonary:      Effort: Pulmonary effort is normal. No respiratory distress.      Breath sounds: Rales present. No wheezing or rhonchi.   Abdominal:      Palpations: Abdomen is soft.      Tenderness: There is no abdominal tenderness. There is no guarding or rebound.   Musculoskeletal:         General: Normal range of motion.      Cervical back: Normal range of motion and neck supple. No rigidity.      Right lower leg: Edema present.      Left lower leg: Edema present.   Skin:     General: Skin is warm and dry.      Capillary Refill: Capillary refill takes less than  2 seconds.      Findings: Erythema present.      Comments: LLE erythema, intertrigo    Neurological:      General: No focal deficit present.      Mental Status: She is alert and oriented to person, place, and time. Mental status is at baseline.      Cranial Nerves: No cranial nerve deficit.   Psychiatric:         Mood and Affect: Mood normal.         Behavior: Behavior normal.         Thought Content: Thought content normal.         Judgment: Judgment normal.         Fluids    Intake/Output Summary (Last 24 hours) at 3/31/2025 0656  Last data filed at 3/30/2025 1921  Gross per 24 hour   Intake 1490 ml   Output --   Net 1490 ml        Laboratory  Recent Labs     03/29/25  1525 03/30/25  1206   WBC 15.4* 9.2   RBC 4.73 4.09*   HEMOGLOBIN 15.1 12.9   HEMATOCRIT 43.2 37.4   MCV 91.3 91.4   MCH 31.9 31.5   MCHC 35.0 34.5   RDW 39.9 40.1   PLATELETCT 261 213   MPV 9.1 8.9*     Recent Labs     03/29/25  1525 03/30/25  1206   SODIUM 131* 134*   POTASSIUM 3.8 3.5*   CHLORIDE 99 102   CO2 16* 20   GLUCOSE 132* 137*   BUN 21 12   CREATININE 1.72* 0.75   CALCIUM 9.6 8.5     Recent Labs     03/29/25  1757   INR 1.37*               Imaging  US-EXTREMITY VENOUS LOWER BILAT   Final Result      DX-CHEST-PORTABLE (1 VIEW)   Final Result      No acute cardiac or pulmonary abnormalities are identified.           Assessment/Plan  * Cellulitis of left lower extremity- (present on admission)  Assessment & Plan  Of left lower extremity  Leukocytosis, elevated inflammatory markers, substance abuse  IVF  Wound care  Supportive pain control  Antibiotic: Unasyn, Zyvox  -Given Ancef 2G in ER  Check MRSA nares  Follow cultures    FRANCK (acute kidney injury) ([facility])  Assessment & Plan  IVF  Minimize nephrotoxic agents, renally dose meds  Check FeNa    Bilateral leg edema  Assessment & Plan  Likely secondary to cellulitis  Check LE Doppler    Substance abuse ([facility])  Assessment & Plan  Admits to smoking fentanyl  Denies IVDA    Tobacco abuse  counseling  Assessment & Plan  Admits to smoking at least 1 pack of cigarettes daily  Tobacco smoking cessation counseling provided: 11 minutes  Educated patient regarding tobacco smoking detrimental effect on cardiovascular health, pulmonary health, as well as impediment and slowing of wound healing which will affect her cellulitis recovery process.  She expressed understanding, however does admit to difficulty with cessation.  Patient was offered with nicotine patch, nicotine gum, Wellbutrin, Chantix as alternative to help her cope with nicotine craving and avoidance.  However, she declined all.  Patient was provided with standard tobacco cessation information per protocol    Acute UTI- (present on admission)  Assessment & Plan  UA pyuria  Follow cultures  Already on antibiotic as above    Sepsis due to cellulitis (HCC)- (present on admission)  Assessment & Plan  This is Sepsis Present on admission  SIRS criteria identified on my evaluation include: Tachycardia, with heart rate greater than 90 BPM, Tachypnea, with respirations greater than 20 per minute, Leukocytosis, with WBC greater than 12,000, and Bandemia, greater than 10% bands  Clinical indicators of end organ dysfunction include Lactic Acid greater than 2 and Acute On Chronic Renal Failure, with creatinine >0.5 above baseline level  Source is LLE cellulitis  Sepsis protocol initiated  Crystalloid Fluid Administration: Fluid resuscitation ordered per standard protocol - 30 mL/kg per current or ideal body weight  IV antibiotics as appropriate for source of sepsis  Reassessment: I have reassessed the patient's hemodynamic status    Type 2 diabetes mellitus with hyperglycemia (HCC)- (present on admission)  Assessment & Plan  ISS while inpatient  Statin         VTE prophylaxis:    Xarelto 10mg daily as prophylaxis      I have performed a physical exam and reviewed and updated ROS and Plan today (3/30/2025). In review of yesterday's note (3/29/2025), there are  no changes except as documented above.  Total time spent 53 minutes. I spent greater than 50% of the time for patient care, counseling, and coordination on this date, including unit/floor time, and face-to-face time with the patient as per interval events, my own review of patient's imaging and lab analysis and developing my assessment and plan above.

## 2025-03-31 NOTE — PROGRESS NOTES
Assumed pt care with RN. Pt is A&OX4 and states she is in 8/10 pain and pain medications were administered (see MAR). Plan of care discussed, no further questions. Personal belongings and call light within reach, bed alarm on.

## 2025-03-31 NOTE — CARE PLAN
The patient is Stable - Low risk of patient condition declining or worsening    Shift Goals  Clinical Goals: Pt to remain free from falls during the night shift  Patient Goals: Rest and comfort  Family Goals: ARVIND    Progress made toward(s) clinical / shift goals:  Pt remained free from falls during the night shift    Patient is not progressing towards the following goals:

## 2025-03-31 NOTE — DISCHARGE PLANNING
Case Management Discharge Planning    Admission Date: 3/29/2025  GMLOS: 3.5  ALOS: 2    6-Clicks ADL Score: 24  6-Clicks Mobility Score: 24      Anticipated Discharge Dispo:      DME Needed: No    Action(s) Taken: Chart reviewed, pt discussed in IDT rounds. She is likely cleared for discharge today. She needs to get her next PCN shot next Monday and then the following Monday. Patient stating that she is able to get to [facility] for the injections. RN GREYSON called and made her next appointment for 4/7/25 at 1400 per pt request. Then they will schedule her next visit at that time. Information added to discharge paperwork for patient along with community resources and [employer] transportation benefits. Patient aware to call [employer] several days in advance to schedule ride for her appointments    Escalations Completed: None    Medically Clear: Yes    Next Steps: Follow for further discharge needs    Barriers to Discharge: None    Is the patient up for discharge tomorrow: No

## 2025-03-31 NOTE — PROGRESS NOTES
Discharge orders received.  Patient arrived to the discharge lounge.  PIV x2 removed by discharge RN. Meds to beds medications ordered, patient states she will come back and get medications later.  Instructions given, medications reviewed and general discharge education provided to patient.  Follow up appointments discussed.  Patient verbalized understanding of dc instructions and prescriptions.  Patient signed discharge instructions.  Patient verbalized she had all belongings with her, Denied having any home medications locked in our inpatient pharmacy that  she needs back. Patient ambulated with steady gait from discharge lounge to private vehicle. Patient left via car with friend to home in stable condition.

## 2025-03-31 NOTE — DISCHARGE PLANNING
Care Transition Team Assessment    TIERA is pt's mother, Krystin, [phone]     RN CM met with patient at bedside for assessment. She is homeless and stays on the streets. She is independent with ADL's and uses no DME. She has family but they are not a support system for her. She does not have a PCP. She has [place] [employer] and has MTM benefits. She currently has no income and gets $291 in food stamps. She has current fentanyl use. Community resources added to S for social determinants. Per pt she will discharge to place of her choice.    Information Source  Orientation Level: Oriented X4  Information Given By: Patient  Who is responsible for making decisions for patient? : Patient     Elopement Risk  Legal Hold: No  Ambulatory or Self Mobile in Wheelchair: Yes  Disoriented: No  Psychiatric Symptoms: None  History of Wandering: No  Elopement this Admit: No  Vocalizing Wanting to Leave: No  Displays Behaviors, Body Language Wanting to Leave: No-Not at Risk for Elopement  Elopement Risk: Not at Risk for Elopement    Interdisciplinary Discharge Planning  Lives with - Patient's Self Care Capacity: Alone and Able to Care For Self  Support Systems: Friends / Neighbors, Family Member(s)  Housing / Facility: Homeless  Able to Return to Previous ADL's: Yes  Mobility Issues: No  Prior Services: None    Discharge Preparedness  What is your plan after discharge?: Home with help  What are your discharge supports?:  (none)  Prior Functional Level: Ambulatory, Independent with Activities of Daily Living, Independent with Medication Management    Functional Assesment  Prior Functional Level: Ambulatory, Independent with Activities of Daily Living, Independent with Medication Management    Finances  Financial Barriers to Discharge: No  Prescription Coverage: Yes    Advance Directive  Advance Directive?: None    Domestic Abuse  Have you ever been the victim of abuse or violence?: No  Possible Abuse/Neglect Reported to:: Not  Applicable    Psychological Assessment  History of Substance Abuse: Other (comment) (fentanyl)  History of Psychiatric Problems: Yes    Discharge Risks or Barriers  Discharge risks or barriers?: No PCP, Transportation, Substance abuse, Lives alone, no community support  Patient risk factors: Homeless, Lack of outside supports, No PCP    Anticipated Discharge Information  Discharge Disposition: Discharged to home/self care (01)

## 2025-04-03 ENCOUNTER — PHARMACY VISIT (OUTPATIENT)
Dept: PHARMACY | Facility: MEDICAL CENTER | Age: 40
End: 2025-04-03
Payer: COMMERCIAL

## 2025-04-21 NOTE — DISCHARGE SUMMARY
"Discharge Summary    CHIEF COMPLAINT ON ADMISSION  Chief Complaint   Patient presents with    Wound Check     Bilateral foot swelling, left hand, and left leg.     Blood in Urine     Patient reports brown dark urine.     Sent from Urgent Care     Patient sent for concern of Cellulitis of left lower extremity and Acute cystitis without hematuria per notes.       Abdominal Pain       Reason for Admission  sent by      Admission Date  3/29/2025    CODE STATUS  Prior    HPI & HOSPITAL COURSE  From H&P: \"Kat Devlin is a 39 y.o. morbidly obese homeless female with history of tobacco abuse, substance abuse, diabetes who presented 3/29/2025 with evaluation for swelling on left leg.  Patient reported ongoing redness, increased swelling of left leg and pain over the past several days.  She does admit to smoking fentanyl, denies IV DA.  She was seen at urgent care, referred to ER for further evaluation.  In ER, due to concern of cellulitis, as well as UTI, admission requested by ERP.  Therefore, admitted to medicine service for further evaluation and treatment.\"     Interval Problem Update  3/30  Afebrile normal pulse and respiratory rate systolic blood pressure 80s to low 100s pulse ox 90 to 97% on room air.  CBC with normal cell counts, sodium 134 potassium 3.5 glucose 137.  Analgesia adjusted, MRSA nares negative, discontinue linezolid.  Pain levels fluctuating today, response to PRNs.  Cellulitis improving, urinary symptoms improved.   Likely another 1-2 days prior to discharge.     3/31 cellulitis improving, pain controled, vital stable.  Patient requesting to be discharged today.  Will discharge with p.o. antibiotics.    Therefore, she is discharged in good and stable condition to home with close outpatient follow-up.    The patient met 2-midnight criteria for an inpatient stay at the time of discharge.    Discharge Date  3/31/2025    FOLLOW UP ITEMS POST DISCHARGE  PCP follow-up for posthospital discharge " care    DISCHARGE DIAGNOSES  Principal Problem:    Cellulitis of left lower extremity (POA: Yes)  Active Problems:    Type 2 diabetes mellitus with hyperglycemia (HCC) (Chronic) (POA: Yes)      Overview: In-hospital FSBG goal less than 180 mg/dL      SSI qAC & qHS when eating, q6 when NPO      Lab Results       Component Value Date        GLUCOSE 103* 02/07/2016        HBA1C 7.0* 07/22/2014       Prednisone will also increase sugars      Following closely    Sepsis due to cellulitis (HCC) (POA: Yes)    Acute UTI (POA: Yes)    Tobacco abuse counseling (POA: Unknown)    Substance abuse (HCC) (POA: Unknown)    Bilateral leg edema (POA: Unknown)    FRANCK (acute kidney injury) (HCC) (POA: Unknown)  Resolved Problems:    * No resolved hospital problems. *      FOLLOW UP  No future appointments.  [facility]  [address] [facility] [address]  [phone]  Go in 1 week(s)  Your appointment is Monday 4/7/2025 at 2:00 pm for your next Penicilin injection and then make another follow up appointment for the following Monday.    Pcp Pt States None            MEDICATIONS ON DISCHARGE     Medication List        START taking these medications        Instructions   nystatin powder  Commonly known as: Mycostatin   Apply 1 g topically 3 times a day.  Dose: 1 Application            ASK your doctor about these medications        Instructions   cephALEXin 500 MG Caps  Commonly known as: Keflex  Ask about: Should I take this medication?   Take 2 Capsules by mouth every 8 hours for 6 days.  Dose: 1,000 mg     HYDROcodone-acetaminophen 5-325 MG Tabs per tablet  Commonly known as: Norco  Ask about: Should I take this medication?   Take 1 Tablet by mouth every 6 hours as needed (severe pain) for up to 4 days.  Dose: 1 Tablet              Allergies  No Known Allergies    DIET  No orders of the defined types were placed in this encounter.      ACTIVITY  As tolerated.  Weight bearing as  tolerated    CONSULTATIONS  NA    PROCEDURES  NA    LABORATORY  Lab Results   Component Value Date    SODIUM 134 (L) 03/30/2025    POTASSIUM 3.5 (L) 03/30/2025    CHLORIDE 102 03/30/2025    CO2 20 03/30/2025    GLUCOSE 137 (H) 03/30/2025    BUN 12 03/30/2025    CREATININE 0.75 03/30/2025    CREATININE 0.8 07/02/2008        Lab Results   Component Value Date    WBC 9.2 03/30/2025    HEMOGLOBIN 12.9 03/30/2025    HEMATOCRIT 37.4 03/30/2025    PLATELETCT 213 03/30/2025        Total time of the discharge process exceeds 35 minutes.

## (undated) DEVICE — SYRINGE 10 ML CONTROL LL (25EA/BX 4BX/CA)

## (undated) DEVICE — TOWELS CLOTH SURGICAL - (4/PK 20PK/CA)

## (undated) DEVICE — GLOVE SZ 6.5 BIOGEL PI MICRO - PF LF (50PR/BX)

## (undated) DEVICE — TUBING PATIENT W/CONNECTOR REDEUCE (1EA)

## (undated) DEVICE — SET LEADWIRE 5 LEAD BEDSIDE DISPOSABLE ECG (1SET OF 5/EA)

## (undated) DEVICE — SYRINGE 30 ML LL (56/BX)

## (undated) DEVICE — SUCTION INSTRUMENT YANKAUER BULBOUS TIP W/O VENT (50EA/CA)

## (undated) DEVICE — MASK ANESTHESIA ADULT  - (100/CA)

## (undated) DEVICE — SUTURE 3-0 ETHILON PS-1 (36PK/BX)

## (undated) DEVICE — SENSOR SPO2 NEO LNCS ADHESIVE (20/BX) SEE USER NOTES

## (undated) DEVICE — CANISTER SUCTION 3000ML MECHANICAL FILTER AUTO SHUTOFF MEDI-VAC NONSTERILE LF DISP  (40EA/CA)

## (undated) DEVICE — HEAD HOLDER JUNIOR/ADULT

## (undated) DEVICE — DETERGENT RENUZYME PLUS 10 OZ PACKET (50/BX)

## (undated) DEVICE — GLOVE BIOGEL SZ 7 SURGICAL PF LTX - (50PR/BX 4BX/CA)

## (undated) DEVICE — LACTATED RINGERS INJ 1000 ML - (14EA/CA 60CA/PF)

## (undated) DEVICE — SET EXTENSION WITH 2 PORTS (48EA/CA) ***PART #2C8610 IS A SUBSTITUTE*****

## (undated) DEVICE — NEEDLE NON SAFETY HYPO 22 GA X 1 1/2 IN (100/BX)

## (undated) DEVICE — GOWN WARMING STANDARD FLEX - (30/CA)

## (undated) DEVICE — ELECTRODE 850 FOAM ADHESIVE - HYDROGEL RADIOTRNSPRNT (50/PK)

## (undated) DEVICE — SODIUM CHL. IRRIGATION 0.9% 3000ML (4EA/CA 65CA/PF)

## (undated) DEVICE — SUTURE GENERAL

## (undated) DEVICE — KIT ROOM DECONTAMINATION

## (undated) DEVICE — PACK ARTHROSCOPY - (2EA//CA)

## (undated) DEVICE — PROTECTOR ULNA NERVE - (36PR/CA)

## (undated) DEVICE — SODIUM CHL IRRIGATION 0.9% 1000ML (12EA/CA)

## (undated) DEVICE — CHLORAPREP 26 ML APPLICATOR - ORANGE TINT(25/CA)

## (undated) DEVICE — KIT ANESTHESIA W/CIRCUIT & 3/LT BAG W/FILTER (20EA/CA)

## (undated) DEVICE — GLOVE BIOGEL PI INDICATOR SZ 7.0 SURGICAL PF LF - (50/BX 4BX/CA)

## (undated) DEVICE — BOVIE BLADE COATED - (50/PK)

## (undated) DEVICE — TUBING CLEARLINK DUO-VENT - C-FLO (48EA/CA)